# Patient Record
Sex: FEMALE | Race: BLACK OR AFRICAN AMERICAN | NOT HISPANIC OR LATINO | Employment: FULL TIME | URBAN - METROPOLITAN AREA
[De-identification: names, ages, dates, MRNs, and addresses within clinical notes are randomized per-mention and may not be internally consistent; named-entity substitution may affect disease eponyms.]

---

## 2018-12-18 ENCOUNTER — OFFICE VISIT (OUTPATIENT)
Dept: INTERNAL MEDICINE | Facility: CLINIC | Age: 33
End: 2018-12-18
Payer: COMMERCIAL

## 2018-12-18 ENCOUNTER — LAB VISIT (OUTPATIENT)
Dept: LAB | Facility: HOSPITAL | Age: 33
End: 2018-12-18
Attending: INTERNAL MEDICINE
Payer: COMMERCIAL

## 2018-12-18 VITALS
SYSTOLIC BLOOD PRESSURE: 124 MMHG | HEART RATE: 98 BPM | DIASTOLIC BLOOD PRESSURE: 84 MMHG | WEIGHT: 185.63 LBS | BODY MASS INDEX: 35.05 KG/M2 | TEMPERATURE: 97 F | HEIGHT: 61 IN | OXYGEN SATURATION: 99 %

## 2018-12-18 DIAGNOSIS — Z00.00 ANNUAL PHYSICAL EXAM: Primary | ICD-10-CM

## 2018-12-18 DIAGNOSIS — E66.01 SEVERE OBESITY WITH BODY MASS INDEX (BMI) OF 35.0 TO 39.9 WITH COMORBIDITY: ICD-10-CM

## 2018-12-18 DIAGNOSIS — I10 HYPERTENSION GOAL BP (BLOOD PRESSURE) < 140/90: ICD-10-CM

## 2018-12-18 LAB
ALBUMIN SERPL BCP-MCNC: 3.8 G/DL
ALP SERPL-CCNC: 98 U/L
ALT SERPL W/O P-5'-P-CCNC: 12 U/L
ANION GAP SERPL CALC-SCNC: 11 MMOL/L
AST SERPL-CCNC: 13 U/L
BASOPHILS # BLD AUTO: 0.04 K/UL
BASOPHILS NFR BLD: 1.1 %
BILIRUB SERPL-MCNC: 0.3 MG/DL
BUN SERPL-MCNC: 14 MG/DL
CALCIUM SERPL-MCNC: 9.7 MG/DL
CHLORIDE SERPL-SCNC: 107 MMOL/L
CHOLEST SERPL-MCNC: 184 MG/DL
CHOLEST/HDLC SERPL: 4.8 {RATIO}
CO2 SERPL-SCNC: 24 MMOL/L
CREAT SERPL-MCNC: 0.8 MG/DL
DIFFERENTIAL METHOD: ABNORMAL
EOSINOPHIL # BLD AUTO: 0.1 K/UL
EOSINOPHIL NFR BLD: 1.7 %
ERYTHROCYTE [DISTWIDTH] IN BLOOD BY AUTOMATED COUNT: 16.2 %
EST. GFR  (AFRICAN AMERICAN): >60 ML/MIN/1.73 M^2
EST. GFR  (NON AFRICAN AMERICAN): >60 ML/MIN/1.73 M^2
GLUCOSE SERPL-MCNC: 94 MG/DL
HCT VFR BLD AUTO: 37.3 %
HDLC SERPL-MCNC: 38 MG/DL
HDLC SERPL: 20.7 %
HGB BLD-MCNC: 11.6 G/DL
IMM GRANULOCYTES # BLD AUTO: 0.01 K/UL
IMM GRANULOCYTES NFR BLD AUTO: 0.3 %
LDLC SERPL CALC-MCNC: 120.2 MG/DL
LYMPHOCYTES # BLD AUTO: 1.4 K/UL
LYMPHOCYTES NFR BLD: 40 %
MCH RBC QN AUTO: 25.1 PG
MCHC RBC AUTO-ENTMCNC: 31.1 G/DL
MCV RBC AUTO: 81 FL
MONOCYTES # BLD AUTO: 0.4 K/UL
MONOCYTES NFR BLD: 10.1 %
NEUTROPHILS # BLD AUTO: 1.7 K/UL
NEUTROPHILS NFR BLD: 46.8 %
NONHDLC SERPL-MCNC: 146 MG/DL
NRBC BLD-RTO: 0 /100 WBC
PLATELET # BLD AUTO: 296 K/UL
PMV BLD AUTO: 10.8 FL
POTASSIUM SERPL-SCNC: 4.1 MMOL/L
PROT SERPL-MCNC: 7.5 G/DL
RBC # BLD AUTO: 4.63 M/UL
SODIUM SERPL-SCNC: 142 MMOL/L
TRIGL SERPL-MCNC: 129 MG/DL
TSH SERPL DL<=0.005 MIU/L-ACNC: 1.28 UIU/ML
WBC # BLD AUTO: 3.55 K/UL

## 2018-12-18 PROCEDURE — 85025 COMPLETE CBC W/AUTO DIFF WBC: CPT

## 2018-12-18 PROCEDURE — 80061 LIPID PANEL: CPT

## 2018-12-18 PROCEDURE — 36415 COLL VENOUS BLD VENIPUNCTURE: CPT

## 2018-12-18 PROCEDURE — 99385 PREV VISIT NEW AGE 18-39: CPT | Mod: S$GLB,,, | Performed by: INTERNAL MEDICINE

## 2018-12-18 PROCEDURE — 80053 COMPREHEN METABOLIC PANEL: CPT

## 2018-12-18 PROCEDURE — 84443 ASSAY THYROID STIM HORMONE: CPT

## 2018-12-18 PROCEDURE — 99999 PR PBB SHADOW E&M-EST. PATIENT-LVL III: CPT | Mod: PBBFAC,,, | Performed by: INTERNAL MEDICINE

## 2018-12-18 RX ORDER — NIFEDIPINE 90 MG/1
90 TABLET, FILM COATED, EXTENDED RELEASE ORAL DAILY
Refills: 12 | COMMUNITY
Start: 2018-11-15 | End: 2020-09-23

## 2018-12-18 NOTE — PROGRESS NOTES
Subjective:       Patient ID: Shania Tapia is a 33 y.o. female.    Chief Complaint: Establish Care and Annual Exam    33 y.o. Black or  female     Patient presents with:  Establish Care  Annual Exam    HPI: Here today to establish care and an annual exam.   She is two months post partum. She had pre-eclampsia during pregnancy. She was diagnosed with HTN following delivery. She is currently taking nifedipine 90 mg. Her b/p is stable. She denies side effects.     Past Medical History:  Abnormal Pap smear  Abnormal Pap smear of vagina  Hypertension    Past Surgical History:  CERVICAL BIOPSY      Comment:  Conization     Review of patient's family history indicates:  Problem: Diabetes      Relation: Paternal Grandmother          Age of Onset: (Not Specified)  Problem: Hypertension      Relation: Father          Age of Onset: (Not Specified)  Problem: Heart attack      Relation: Father          Age of Onset: (Not Specified)  Problem: Breast cancer      Relation: Neg Hx          Age of Onset: (Not Specified)  Problem: Colon cancer      Relation: Neg Hx          Age of Onset: (Not Specified)  Problem: Ovarian cancer      Relation: Neg Hx          Age of Onset: (Not Specified)      Current Outpatient Medications on File Prior to Visit:  NIFEdipine (ADALAT CC) 90 MG TbSR, Take 90 mg by mouth once daily., Disp: , Rfl: 12    Allergies:   Review of patient's allergies indicates:   -- Latex, natural rubber -- Rash              Review of Systems   Constitutional: Negative for fatigue, fever and unexpected weight change.   HENT: Negative for trouble swallowing and voice change.    Eyes: Negative for visual disturbance.   Respiratory: Negative for cough and shortness of breath.    Cardiovascular: Negative for chest pain, palpitations and leg swelling.   Gastrointestinal: Negative for abdominal pain, blood in stool and constipation.   Genitourinary: Negative for difficulty urinating, dysuria and menstrual  problem.   Musculoskeletal: Negative for arthralgias, back pain, gait problem and joint swelling.   Skin: Negative for rash.   Neurological: Negative for dizziness and headaches.   Psychiatric/Behavioral: Negative for dysphoric mood and sleep disturbance. The patient is not nervous/anxious.        Objective:      Physical Exam   Constitutional: She is oriented to person, place, and time. She appears well-developed and well-nourished. No distress.   HENT:   Mouth/Throat: No oropharyngeal exudate.   Eyes: No scleral icterus.   Neck: No tracheal deviation present. No thyromegaly present.   Cardiovascular: Normal rate, regular rhythm and normal heart sounds.   Pulmonary/Chest: Effort normal and breath sounds normal. No respiratory distress. She has no wheezes. She has no rales.   Abdominal: Soft. Bowel sounds are normal.   Musculoskeletal: She exhibits no edema.   Lymphadenopathy:     She has no cervical adenopathy.   Neurological: She is alert and oriented to person, place, and time.   Skin: Skin is warm and dry.   Psychiatric: She has a normal mood and affect.   Vitals reviewed.      Assessment:       1. Annual physical exam    2. Hypertension goal BP (blood pressure) < 140/90    3. Severe obesity with body mass index (BMI) of 35.0 to 39.9 with comorbidity        Plan:       Shania was seen today for establish care and annual exam.    Diagnoses and all orders for this visit:    Annual physical exam  -     Counseled regarding age appropriate screenings and immunizations  -     Counseled regarding lifestyle modifications  -     Lipid panel; Future  -     Comprehensive metabolic panel; Future  -     TSH; Future  -     CBC auto differential; Future    Hypertension goal BP (blood pressure) < 140/90  -     Continue current dose of nifedipine  -     Lifestyle modifications discussed  -     Lipid panel; Future  -     Comprehensive metabolic panel; Future  -     TSH; Future  -     CBC auto differential; Future    Severe  obesity with body mass index (BMI) of 35.0 to 39.9 with comorbidity  -     Lifestyle modifications discussed    Labs today.     Obtain Pap smear report.     F/U in 6 months and as needed.

## 2018-12-24 ENCOUNTER — TELEPHONE (OUTPATIENT)
Dept: INTERNAL MEDICINE | Facility: CLINIC | Age: 33
End: 2018-12-24

## 2018-12-24 DIAGNOSIS — D72.819 LEUKOPENIA, UNSPECIFIED TYPE: Primary | ICD-10-CM

## 2018-12-24 NOTE — TELEPHONE ENCOUNTER
----- Message from Flavia Fink DO sent at 12/24/2018 11:15 AM CST -----  Schedule repeat CBC in 1 month.

## 2019-01-22 ENCOUNTER — PATIENT MESSAGE (OUTPATIENT)
Dept: INTERNAL MEDICINE | Facility: CLINIC | Age: 34
End: 2019-01-22

## 2019-01-24 ENCOUNTER — LAB VISIT (OUTPATIENT)
Dept: LAB | Facility: HOSPITAL | Age: 34
End: 2019-01-24
Payer: COMMERCIAL

## 2019-01-24 DIAGNOSIS — D72.819 LEUKOPENIA, UNSPECIFIED TYPE: ICD-10-CM

## 2019-01-24 LAB
BASOPHILS # BLD AUTO: 0.03 K/UL
BASOPHILS NFR BLD: 0.9 %
DIFFERENTIAL METHOD: ABNORMAL
EOSINOPHIL # BLD AUTO: 0 K/UL
EOSINOPHIL NFR BLD: 0.9 %
ERYTHROCYTE [DISTWIDTH] IN BLOOD BY AUTOMATED COUNT: 13.9 %
HCT VFR BLD AUTO: 36.5 %
HGB BLD-MCNC: 11 G/DL
IMM GRANULOCYTES # BLD AUTO: 0 K/UL
IMM GRANULOCYTES NFR BLD AUTO: 0 %
LYMPHOCYTES # BLD AUTO: 1.3 K/UL
LYMPHOCYTES NFR BLD: 37.9 %
MCH RBC QN AUTO: 24.8 PG
MCHC RBC AUTO-ENTMCNC: 30.1 G/DL
MCV RBC AUTO: 82 FL
MONOCYTES # BLD AUTO: 0.4 K/UL
MONOCYTES NFR BLD: 11.3 %
NEUTROPHILS # BLD AUTO: 1.6 K/UL
NEUTROPHILS NFR BLD: 49 %
NRBC BLD-RTO: 0 /100 WBC
PLATELET # BLD AUTO: 321 K/UL
PMV BLD AUTO: 11 FL
RBC # BLD AUTO: 4.44 M/UL
WBC # BLD AUTO: 3.35 K/UL

## 2019-01-24 PROCEDURE — 85025 COMPLETE CBC W/AUTO DIFF WBC: CPT

## 2019-01-24 PROCEDURE — 36415 COLL VENOUS BLD VENIPUNCTURE: CPT

## 2019-01-29 ENCOUNTER — TELEPHONE (OUTPATIENT)
Dept: INTERNAL MEDICINE | Facility: CLINIC | Age: 34
End: 2019-01-29

## 2020-09-23 ENCOUNTER — OFFICE VISIT (OUTPATIENT)
Dept: INTERNAL MEDICINE | Facility: CLINIC | Age: 35
End: 2020-09-23
Payer: COMMERCIAL

## 2020-09-23 VITALS
SYSTOLIC BLOOD PRESSURE: 146 MMHG | BODY MASS INDEX: 32.51 KG/M2 | HEIGHT: 61 IN | HEART RATE: 111 BPM | WEIGHT: 172.19 LBS | OXYGEN SATURATION: 96 % | DIASTOLIC BLOOD PRESSURE: 86 MMHG | TEMPERATURE: 99 F

## 2020-09-23 DIAGNOSIS — Z11.4 SCREENING FOR HIV (HUMAN IMMUNODEFICIENCY VIRUS): ICD-10-CM

## 2020-09-23 DIAGNOSIS — Z11.59 NEED FOR HEPATITIS C SCREENING TEST: ICD-10-CM

## 2020-09-23 DIAGNOSIS — I10 ESSENTIAL HYPERTENSION: Primary | ICD-10-CM

## 2020-09-23 DIAGNOSIS — F41.9 ANXIETY: ICD-10-CM

## 2020-09-23 PROCEDURE — 99999 PR PBB SHADOW E&M-EST. PATIENT-LVL III: ICD-10-PCS | Mod: PBBFAC,,, | Performed by: INTERNAL MEDICINE

## 2020-09-23 PROCEDURE — 3008F BODY MASS INDEX DOCD: CPT | Mod: CPTII,S$GLB,, | Performed by: INTERNAL MEDICINE

## 2020-09-23 PROCEDURE — 3079F DIAST BP 80-89 MM HG: CPT | Mod: CPTII,S$GLB,, | Performed by: INTERNAL MEDICINE

## 2020-09-23 PROCEDURE — 3077F SYST BP >= 140 MM HG: CPT | Mod: CPTII,S$GLB,, | Performed by: INTERNAL MEDICINE

## 2020-09-23 PROCEDURE — 99999 PR PBB SHADOW E&M-EST. PATIENT-LVL III: CPT | Mod: PBBFAC,,, | Performed by: INTERNAL MEDICINE

## 2020-09-23 PROCEDURE — 3077F PR MOST RECENT SYSTOLIC BLOOD PRESSURE >= 140 MM HG: ICD-10-PCS | Mod: CPTII,S$GLB,, | Performed by: INTERNAL MEDICINE

## 2020-09-23 PROCEDURE — 3079F PR MOST RECENT DIASTOLIC BLOOD PRESSURE 80-89 MM HG: ICD-10-PCS | Mod: CPTII,S$GLB,, | Performed by: INTERNAL MEDICINE

## 2020-09-23 PROCEDURE — 99214 PR OFFICE/OUTPT VISIT, EST, LEVL IV, 30-39 MIN: ICD-10-PCS | Mod: S$GLB,,, | Performed by: INTERNAL MEDICINE

## 2020-09-23 PROCEDURE — 99214 OFFICE O/P EST MOD 30 MIN: CPT | Mod: S$GLB,,, | Performed by: INTERNAL MEDICINE

## 2020-09-23 PROCEDURE — 3008F PR BODY MASS INDEX (BMI) DOCUMENTED: ICD-10-PCS | Mod: CPTII,S$GLB,, | Performed by: INTERNAL MEDICINE

## 2020-09-23 RX ORDER — NIFEDIPINE 30 MG/1
30 TABLET, FILM COATED, EXTENDED RELEASE ORAL DAILY
Qty: 30 TABLET | Refills: 2 | Status: SHIPPED | OUTPATIENT
Start: 2020-09-23 | End: 2020-12-17

## 2020-09-23 RX ORDER — HYDROXYZINE HYDROCHLORIDE 25 MG/1
25 TABLET, FILM COATED ORAL 3 TIMES DAILY PRN
Qty: 45 TABLET | Refills: 1 | Status: SHIPPED | OUTPATIENT
Start: 2020-09-23 | End: 2022-10-20

## 2020-09-23 NOTE — PATIENT INSTRUCTIONS

## 2020-09-23 NOTE — PROGRESS NOTES
Subjective:       Patient ID: Shania Tapia is a 35 y.o. female.    Chief Complaint: Hypertension    Hypertension  This is a recurrent problem. The current episode started more than 1 year ago. The problem has been waxing and waning since onset. The problem is controlled. Associated symptoms include anxiety. Pertinent negatives include no blurred vision, chest pain, headaches, malaise/fatigue, neck pain, orthopnea, palpitations, peripheral edema, PND, shortness of breath or sweats. There are no associated agents to hypertension. There are no known risk factors for coronary artery disease. Past treatments include nothing. The current treatment provides no improvement. Compliance problems include medication side effects.      She does admit to having a lot of anxiety in certain situations. She would like something she can take as needed.     Review of Systems   Constitutional: Negative for malaise/fatigue.   Eyes: Negative for blurred vision.   Respiratory: Negative for shortness of breath.    Cardiovascular: Negative for chest pain, palpitations, orthopnea and PND.   Musculoskeletal: Negative for neck pain.   Neurological: Negative for headaches.   Psychiatric/Behavioral: The patient is nervous/anxious.          Objective:      Physical Exam  Vitals signs reviewed.   Constitutional:       General: She is not in acute distress.     Appearance: She is well-developed. She is obese.   Eyes:      General: No scleral icterus.  Cardiovascular:      Rate and Rhythm: Tachycardia present.   Pulmonary:      Effort: Pulmonary effort is normal. No respiratory distress.   Neurological:      Mental Status: She is alert and oriented to person, place, and time.   Psychiatric:         Behavior: Behavior normal.         Thought Content: Thought content normal.         Judgment: Judgment normal.         Assessment:       1. Essential hypertension    2. Anxiety    3. Screening for HIV (human immunodeficiency virus)    4. Need for  hepatitis C screening test        Plan:       Shania was seen today for hypertension.    Diagnoses and all orders for this visit:    Essential hypertension  -     NIFEdipine (ADALAT CC) 30 MG TbSR; Take 1 tablet (30 mg total) by mouth once daily.  -     Lipid Panel; Standing    Anxiety  -     hydrOXYzine HCL (ATARAX) 25 MG tablet; Take 1 tablet (25 mg total) by mouth 3 (three) times daily as needed for Anxiety. Discussed medication risks and benefits.     Screening for HIV (human immunodeficiency virus)  -     HIV 1/2 Ag/Ab (4th Gen); Future    Need for hepatitis C screening test  -     Hepatitis C Antibody; Future    F/U in 4 week.

## 2020-10-20 ENCOUNTER — LAB VISIT (OUTPATIENT)
Dept: LAB | Facility: HOSPITAL | Age: 35
End: 2020-10-20
Attending: INTERNAL MEDICINE
Payer: COMMERCIAL

## 2020-10-20 ENCOUNTER — OFFICE VISIT (OUTPATIENT)
Dept: INTERNAL MEDICINE | Facility: CLINIC | Age: 35
End: 2020-10-20
Payer: COMMERCIAL

## 2020-10-20 VITALS
SYSTOLIC BLOOD PRESSURE: 114 MMHG | TEMPERATURE: 98 F | HEIGHT: 61 IN | DIASTOLIC BLOOD PRESSURE: 82 MMHG | HEART RATE: 92 BPM | WEIGHT: 172.19 LBS | BODY MASS INDEX: 32.51 KG/M2 | OXYGEN SATURATION: 99 %

## 2020-10-20 DIAGNOSIS — F41.9 ANXIETY: ICD-10-CM

## 2020-10-20 DIAGNOSIS — I10 ESSENTIAL HYPERTENSION: ICD-10-CM

## 2020-10-20 DIAGNOSIS — Z11.59 NEED FOR HEPATITIS C SCREENING TEST: ICD-10-CM

## 2020-10-20 DIAGNOSIS — Z11.4 SCREENING FOR HIV (HUMAN IMMUNODEFICIENCY VIRUS): ICD-10-CM

## 2020-10-20 LAB
ANION GAP SERPL CALC-SCNC: 7 MMOL/L (ref 8–16)
BASOPHILS # BLD AUTO: 0.03 K/UL (ref 0–0.2)
BASOPHILS NFR BLD: 0.9 % (ref 0–1.9)
BUN SERPL-MCNC: 12 MG/DL (ref 6–20)
CALCIUM SERPL-MCNC: 9.2 MG/DL (ref 8.7–10.5)
CHLORIDE SERPL-SCNC: 105 MMOL/L (ref 95–110)
CHOLEST SERPL-MCNC: 173 MG/DL (ref 120–199)
CHOLEST/HDLC SERPL: 3.7 {RATIO} (ref 2–5)
CO2 SERPL-SCNC: 27 MMOL/L (ref 23–29)
CREAT SERPL-MCNC: 0.8 MG/DL (ref 0.5–1.4)
DIFFERENTIAL METHOD: ABNORMAL
EOSINOPHIL # BLD AUTO: 0.1 K/UL (ref 0–0.5)
EOSINOPHIL NFR BLD: 3.1 % (ref 0–8)
ERYTHROCYTE [DISTWIDTH] IN BLOOD BY AUTOMATED COUNT: 18.5 % (ref 11.5–14.5)
EST. GFR  (AFRICAN AMERICAN): >60 ML/MIN/1.73 M^2
EST. GFR  (NON AFRICAN AMERICAN): >60 ML/MIN/1.73 M^2
GLUCOSE SERPL-MCNC: 94 MG/DL (ref 70–110)
HCT VFR BLD AUTO: 35 % (ref 37–48.5)
HDLC SERPL-MCNC: 47 MG/DL (ref 40–75)
HDLC SERPL: 27.2 % (ref 20–50)
HGB BLD-MCNC: 9.7 G/DL (ref 12–16)
IMM GRANULOCYTES # BLD AUTO: 0.01 K/UL (ref 0–0.04)
IMM GRANULOCYTES NFR BLD AUTO: 0.3 % (ref 0–0.5)
LDLC SERPL CALC-MCNC: 106 MG/DL (ref 63–159)
LYMPHOCYTES # BLD AUTO: 1 K/UL (ref 1–4.8)
LYMPHOCYTES NFR BLD: 32 % (ref 18–48)
MCH RBC QN AUTO: 20.1 PG (ref 27–31)
MCHC RBC AUTO-ENTMCNC: 27.7 G/DL (ref 32–36)
MCV RBC AUTO: 73 FL (ref 82–98)
MONOCYTES # BLD AUTO: 0.3 K/UL (ref 0.3–1)
MONOCYTES NFR BLD: 9.6 % (ref 4–15)
NEUTROPHILS # BLD AUTO: 1.7 K/UL (ref 1.8–7.7)
NEUTROPHILS NFR BLD: 54.1 % (ref 38–73)
NONHDLC SERPL-MCNC: 126 MG/DL
NRBC BLD-RTO: 0 /100 WBC
PLATELET # BLD AUTO: 363 K/UL (ref 150–350)
PMV BLD AUTO: 11.8 FL (ref 9.2–12.9)
POTASSIUM SERPL-SCNC: 4 MMOL/L (ref 3.5–5.1)
RBC # BLD AUTO: 4.83 M/UL (ref 4–5.4)
SODIUM SERPL-SCNC: 139 MMOL/L (ref 136–145)
TRIGL SERPL-MCNC: 100 MG/DL (ref 30–150)
WBC # BLD AUTO: 3.22 K/UL (ref 3.9–12.7)

## 2020-10-20 PROCEDURE — 36415 COLL VENOUS BLD VENIPUNCTURE: CPT

## 2020-10-20 PROCEDURE — 99999 PR PBB SHADOW E&M-EST. PATIENT-LVL IV: ICD-10-PCS | Mod: PBBFAC,,, | Performed by: INTERNAL MEDICINE

## 2020-10-20 PROCEDURE — 3079F PR MOST RECENT DIASTOLIC BLOOD PRESSURE 80-89 MM HG: ICD-10-PCS | Mod: CPTII,S$GLB,, | Performed by: INTERNAL MEDICINE

## 2020-10-20 PROCEDURE — 3079F DIAST BP 80-89 MM HG: CPT | Mod: CPTII,S$GLB,, | Performed by: INTERNAL MEDICINE

## 2020-10-20 PROCEDURE — 99213 OFFICE O/P EST LOW 20 MIN: CPT | Mod: S$GLB,,, | Performed by: INTERNAL MEDICINE

## 2020-10-20 PROCEDURE — 3074F SYST BP LT 130 MM HG: CPT | Mod: CPTII,S$GLB,, | Performed by: INTERNAL MEDICINE

## 2020-10-20 PROCEDURE — 99213 PR OFFICE/OUTPT VISIT, EST, LEVL III, 20-29 MIN: ICD-10-PCS | Mod: S$GLB,,, | Performed by: INTERNAL MEDICINE

## 2020-10-20 PROCEDURE — 80048 BASIC METABOLIC PNL TOTAL CA: CPT

## 2020-10-20 PROCEDURE — 86703 HIV-1/HIV-2 1 RESULT ANTBDY: CPT

## 2020-10-20 PROCEDURE — 3008F BODY MASS INDEX DOCD: CPT | Mod: CPTII,S$GLB,, | Performed by: INTERNAL MEDICINE

## 2020-10-20 PROCEDURE — 99999 PR PBB SHADOW E&M-EST. PATIENT-LVL IV: CPT | Mod: PBBFAC,,, | Performed by: INTERNAL MEDICINE

## 2020-10-20 PROCEDURE — 85025 COMPLETE CBC W/AUTO DIFF WBC: CPT

## 2020-10-20 PROCEDURE — 80061 LIPID PANEL: CPT

## 2020-10-20 PROCEDURE — 3074F PR MOST RECENT SYSTOLIC BLOOD PRESSURE < 130 MM HG: ICD-10-PCS | Mod: CPTII,S$GLB,, | Performed by: INTERNAL MEDICINE

## 2020-10-20 PROCEDURE — 3008F PR BODY MASS INDEX (BMI) DOCUMENTED: ICD-10-PCS | Mod: CPTII,S$GLB,, | Performed by: INTERNAL MEDICINE

## 2020-10-20 PROCEDURE — 86803 HEPATITIS C AB TEST: CPT

## 2020-10-20 RX ORDER — NIFEDIPINE 30 MG/1
30 TABLET, EXTENDED RELEASE ORAL DAILY
COMMUNITY
Start: 2020-09-23 | End: 2020-10-20 | Stop reason: SDUPTHER

## 2020-10-20 NOTE — PROGRESS NOTES
Subjective:       Patient ID: Shania Tapia is a 35 y.o. female.    Chief Complaint: Follow-up (HTN/anxiety )    Shania Tapia  35 y.o. Black or  female    Patient presents with:  Follow-up: HTN/anxiety     HPI: Presents to the clinic for a 4 week f/u on HTN and anxiety.   HTN--stable on nifedipine. She denies side effects. She reports a mild headache on occasion but it is not unbearable and does not interfere with her daily activities.   Anxiety--she has not had to take hydroxyzine. She has not had any episodes. She is happy she has the hydroxyzine if she needs it.     Past Medical History:  Abnormal Pap smear  Abnormal Pap smear of vagina  Hypertension    Current Outpatient Medications on File Prior to Visit:  hydrOXYzine HCL (ATARAX) 25 MG tablet, Take 1 tablet (25 mg total) by mouth 3 (three) times daily as needed for Anxiety., Disp: 45 tablet, Rfl: 1  NIFEdipine (ADALAT CC) 30 MG TbSR, Take 1 tablet (30 mg total) by mouth once daily., Disp: 30 tablet, Rfl: 2    Allergies:  Review of patient's allergies indicates:   -- Latex, natural rubber -- Rash        Review of Systems   Constitutional: Negative for activity change and unexpected weight change.   HENT: Negative for hearing loss, rhinorrhea and trouble swallowing.    Eyes: Negative for discharge and visual disturbance.   Respiratory: Negative for chest tightness and wheezing.    Cardiovascular: Negative for chest pain and palpitations.   Gastrointestinal: Negative for blood in stool, constipation, diarrhea and vomiting.   Endocrine: Negative for polydipsia and polyuria.   Genitourinary: Negative for difficulty urinating, dysuria, hematuria and menstrual problem.   Musculoskeletal: Negative for arthralgias, joint swelling and neck pain.   Neurological: Negative for weakness and headaches.   Psychiatric/Behavioral: Negative for confusion and dysphoric mood.         Objective:      Physical Exam  Constitutional:       General: She  is not in acute distress.     Appearance: She is well-developed. She is obese. She is not ill-appearing.   Eyes:      General: No scleral icterus.  Cardiovascular:      Rate and Rhythm: Normal rate.   Pulmonary:      Effort: Pulmonary effort is normal. No respiratory distress.   Neurological:      Mental Status: She is alert and oriented to person, place, and time.   Psychiatric:         Behavior: Behavior normal.         Thought Content: Thought content normal.         Judgment: Judgment normal.         Assessment:       1. Essential hypertension    2. Anxiety        Plan:       Shania was seen today for follow-up.    Diagnoses and all orders for this visit:    Essential hypertension  -     Continue nifedipine  -     CBC auto differential; Future  -     Basic Metabolic Panel; Future    Anxiety  -     Monitor  -     Hydroxyzine if needed    Labs today.     F/U in 6 months and as needed.

## 2020-10-21 LAB
HCV AB SERPL QL IA: NEGATIVE
HIV 1+2 AB+HIV1 P24 AG SERPL QL IA: NEGATIVE

## 2021-04-20 ENCOUNTER — OFFICE VISIT (OUTPATIENT)
Dept: INTERNAL MEDICINE | Facility: CLINIC | Age: 36
End: 2021-04-20
Payer: COMMERCIAL

## 2021-04-20 VITALS
DIASTOLIC BLOOD PRESSURE: 84 MMHG | BODY MASS INDEX: 32.3 KG/M2 | HEIGHT: 61 IN | OXYGEN SATURATION: 97 % | HEART RATE: 108 BPM | SYSTOLIC BLOOD PRESSURE: 132 MMHG | WEIGHT: 171.06 LBS | TEMPERATURE: 97 F

## 2021-04-20 DIAGNOSIS — I10 ESSENTIAL HYPERTENSION: Primary | ICD-10-CM

## 2021-04-20 PROCEDURE — 3079F DIAST BP 80-89 MM HG: CPT | Mod: CPTII,S$GLB,, | Performed by: INTERNAL MEDICINE

## 2021-04-20 PROCEDURE — 99999 PR PBB SHADOW E&M-EST. PATIENT-LVL IV: ICD-10-PCS | Mod: PBBFAC,,, | Performed by: INTERNAL MEDICINE

## 2021-04-20 PROCEDURE — 1126F PR PAIN SEVERITY QUANTIFIED, NO PAIN PRESENT: ICD-10-PCS | Mod: S$GLB,,, | Performed by: INTERNAL MEDICINE

## 2021-04-20 PROCEDURE — 3075F SYST BP GE 130 - 139MM HG: CPT | Mod: CPTII,S$GLB,, | Performed by: INTERNAL MEDICINE

## 2021-04-20 PROCEDURE — 99213 OFFICE O/P EST LOW 20 MIN: CPT | Mod: S$GLB,,, | Performed by: INTERNAL MEDICINE

## 2021-04-20 PROCEDURE — 99999 PR PBB SHADOW E&M-EST. PATIENT-LVL IV: CPT | Mod: PBBFAC,,, | Performed by: INTERNAL MEDICINE

## 2021-04-20 PROCEDURE — 3008F BODY MASS INDEX DOCD: CPT | Mod: CPTII,S$GLB,, | Performed by: INTERNAL MEDICINE

## 2021-04-20 PROCEDURE — 99213 PR OFFICE/OUTPT VISIT, EST, LEVL III, 20-29 MIN: ICD-10-PCS | Mod: S$GLB,,, | Performed by: INTERNAL MEDICINE

## 2021-04-20 PROCEDURE — 3075F PR MOST RECENT SYSTOLIC BLOOD PRESS GE 130-139MM HG: ICD-10-PCS | Mod: CPTII,S$GLB,, | Performed by: INTERNAL MEDICINE

## 2021-04-20 PROCEDURE — 3079F PR MOST RECENT DIASTOLIC BLOOD PRESSURE 80-89 MM HG: ICD-10-PCS | Mod: CPTII,S$GLB,, | Performed by: INTERNAL MEDICINE

## 2021-04-20 PROCEDURE — 3008F PR BODY MASS INDEX (BMI) DOCUMENTED: ICD-10-PCS | Mod: CPTII,S$GLB,, | Performed by: INTERNAL MEDICINE

## 2021-04-20 PROCEDURE — 1126F AMNT PAIN NOTED NONE PRSNT: CPT | Mod: S$GLB,,, | Performed by: INTERNAL MEDICINE

## 2021-04-28 ENCOUNTER — PATIENT MESSAGE (OUTPATIENT)
Dept: RESEARCH | Facility: HOSPITAL | Age: 36
End: 2021-04-28

## 2021-06-18 ENCOUNTER — IMMUNIZATION (OUTPATIENT)
Dept: INTERNAL MEDICINE | Facility: CLINIC | Age: 36
End: 2021-06-18
Payer: COMMERCIAL

## 2021-06-18 DIAGNOSIS — Z23 NEED FOR VACCINATION: Primary | ICD-10-CM

## 2021-06-18 PROCEDURE — 91300 COVID-19, MRNA, LNP-S, PF, 30 MCG/0.3 ML DOSE VACCINE: CPT | Mod: PBBFAC | Performed by: FAMILY MEDICINE

## 2021-07-09 ENCOUNTER — IMMUNIZATION (OUTPATIENT)
Dept: INTERNAL MEDICINE | Facility: CLINIC | Age: 36
End: 2021-07-09
Payer: COMMERCIAL

## 2021-07-09 DIAGNOSIS — Z23 NEED FOR VACCINATION: Primary | ICD-10-CM

## 2021-07-09 PROCEDURE — 91300 COVID-19, MRNA, LNP-S, PF, 30 MCG/0.3 ML DOSE VACCINE: CPT | Mod: PBBFAC | Performed by: FAMILY MEDICINE

## 2021-07-09 PROCEDURE — 0002A COVID-19, MRNA, LNP-S, PF, 30 MCG/0.3 ML DOSE VACCINE: CPT | Mod: PBBFAC | Performed by: FAMILY MEDICINE

## 2021-07-23 DIAGNOSIS — I10 ESSENTIAL HYPERTENSION: ICD-10-CM

## 2021-07-23 RX ORDER — NIFEDIPINE 30 MG/1
TABLET, EXTENDED RELEASE ORAL
Qty: 90 TABLET | Refills: 2 | Status: SHIPPED | OUTPATIENT
Start: 2021-07-23 | End: 2022-02-12 | Stop reason: SDUPTHER

## 2021-10-20 ENCOUNTER — LAB VISIT (OUTPATIENT)
Dept: LAB | Facility: HOSPITAL | Age: 36
End: 2021-10-20
Attending: INTERNAL MEDICINE
Payer: COMMERCIAL

## 2021-10-20 ENCOUNTER — OFFICE VISIT (OUTPATIENT)
Dept: INTERNAL MEDICINE | Facility: CLINIC | Age: 36
End: 2021-10-20
Payer: COMMERCIAL

## 2021-10-20 VITALS
RESPIRATION RATE: 16 BRPM | DIASTOLIC BLOOD PRESSURE: 84 MMHG | HEART RATE: 88 BPM | BODY MASS INDEX: 32.89 KG/M2 | OXYGEN SATURATION: 97 % | HEIGHT: 61 IN | WEIGHT: 174.19 LBS | SYSTOLIC BLOOD PRESSURE: 138 MMHG | TEMPERATURE: 98 F

## 2021-10-20 DIAGNOSIS — D50.9 MICROCYTIC ANEMIA: ICD-10-CM

## 2021-10-20 DIAGNOSIS — Z00.00 ANNUAL PHYSICAL EXAM: ICD-10-CM

## 2021-10-20 DIAGNOSIS — Z00.00 ANNUAL PHYSICAL EXAM: Primary | ICD-10-CM

## 2021-10-20 DIAGNOSIS — F41.9 ANXIETY: ICD-10-CM

## 2021-10-20 DIAGNOSIS — I10 ESSENTIAL HYPERTENSION: ICD-10-CM

## 2021-10-20 DIAGNOSIS — E66.9 OBESITY (BMI 30.0-34.9): ICD-10-CM

## 2021-10-20 LAB
ALBUMIN SERPL BCP-MCNC: 3.9 G/DL (ref 3.5–5.2)
ALP SERPL-CCNC: 78 U/L (ref 55–135)
ALT SERPL W/O P-5'-P-CCNC: 8 U/L (ref 10–44)
ANION GAP SERPL CALC-SCNC: 12 MMOL/L (ref 8–16)
AST SERPL-CCNC: 15 U/L (ref 10–40)
BASOPHILS # BLD AUTO: 0.03 K/UL (ref 0–0.2)
BASOPHILS NFR BLD: 0.8 % (ref 0–1.9)
BILIRUB SERPL-MCNC: 0.6 MG/DL (ref 0.1–1)
BUN SERPL-MCNC: 9 MG/DL (ref 6–20)
CALCIUM SERPL-MCNC: 9.2 MG/DL (ref 8.7–10.5)
CHLORIDE SERPL-SCNC: 107 MMOL/L (ref 95–110)
CHOLEST SERPL-MCNC: 164 MG/DL (ref 120–199)
CHOLEST/HDLC SERPL: 3.6 {RATIO} (ref 2–5)
CO2 SERPL-SCNC: 22 MMOL/L (ref 23–29)
CREAT SERPL-MCNC: 0.8 MG/DL (ref 0.5–1.4)
DIFFERENTIAL METHOD: ABNORMAL
EOSINOPHIL # BLD AUTO: 0 K/UL (ref 0–0.5)
EOSINOPHIL NFR BLD: 0.8 % (ref 0–8)
ERYTHROCYTE [DISTWIDTH] IN BLOOD BY AUTOMATED COUNT: 17.3 % (ref 11.5–14.5)
EST. GFR  (AFRICAN AMERICAN): >60 ML/MIN/1.73 M^2
EST. GFR  (NON AFRICAN AMERICAN): >60 ML/MIN/1.73 M^2
FERRITIN SERPL-MCNC: 5 NG/ML (ref 20–300)
GLUCOSE SERPL-MCNC: 86 MG/DL (ref 70–110)
HCT VFR BLD AUTO: 34.9 % (ref 37–48.5)
HDLC SERPL-MCNC: 45 MG/DL (ref 40–75)
HDLC SERPL: 27.4 % (ref 20–50)
HGB BLD-MCNC: 10.2 G/DL (ref 12–16)
IMM GRANULOCYTES # BLD AUTO: 0 K/UL (ref 0–0.04)
IMM GRANULOCYTES NFR BLD AUTO: 0 % (ref 0–0.5)
IRON SERPL-MCNC: 23 UG/DL (ref 30–160)
LDLC SERPL CALC-MCNC: 103.6 MG/DL (ref 63–159)
LYMPHOCYTES # BLD AUTO: 1 K/UL (ref 1–4.8)
LYMPHOCYTES NFR BLD: 27.3 % (ref 18–48)
MCH RBC QN AUTO: 21.6 PG (ref 27–31)
MCHC RBC AUTO-ENTMCNC: 29.2 G/DL (ref 32–36)
MCV RBC AUTO: 74 FL (ref 82–98)
MONOCYTES # BLD AUTO: 0.4 K/UL (ref 0.3–1)
MONOCYTES NFR BLD: 9.7 % (ref 4–15)
NEUTROPHILS # BLD AUTO: 2.3 K/UL (ref 1.8–7.7)
NEUTROPHILS NFR BLD: 61.4 % (ref 38–73)
NONHDLC SERPL-MCNC: 119 MG/DL
NRBC BLD-RTO: 0 /100 WBC
PLATELET # BLD AUTO: 328 K/UL (ref 150–450)
PMV BLD AUTO: 11.7 FL (ref 9.2–12.9)
POTASSIUM SERPL-SCNC: 4.1 MMOL/L (ref 3.5–5.1)
PROT SERPL-MCNC: 7.4 G/DL (ref 6–8.4)
RBC # BLD AUTO: 4.72 M/UL (ref 4–5.4)
SATURATED IRON: 5 % (ref 20–50)
SODIUM SERPL-SCNC: 141 MMOL/L (ref 136–145)
TOTAL IRON BINDING CAPACITY: 488 UG/DL (ref 250–450)
TRANSFERRIN SERPL-MCNC: 330 MG/DL (ref 200–375)
TRIGL SERPL-MCNC: 77 MG/DL (ref 30–150)
TSH SERPL DL<=0.005 MIU/L-ACNC: 1.07 UIU/ML (ref 0.4–4)
WBC # BLD AUTO: 3.7 K/UL (ref 3.9–12.7)

## 2021-10-20 PROCEDURE — 80053 COMPREHEN METABOLIC PANEL: CPT | Performed by: INTERNAL MEDICINE

## 2021-10-20 PROCEDURE — 3008F BODY MASS INDEX DOCD: CPT | Mod: CPTII,S$GLB,, | Performed by: INTERNAL MEDICINE

## 2021-10-20 PROCEDURE — 84466 ASSAY OF TRANSFERRIN: CPT | Performed by: INTERNAL MEDICINE

## 2021-10-20 PROCEDURE — 3079F DIAST BP 80-89 MM HG: CPT | Mod: CPTII,S$GLB,, | Performed by: INTERNAL MEDICINE

## 2021-10-20 PROCEDURE — 1160F RVW MEDS BY RX/DR IN RCRD: CPT | Mod: CPTII,S$GLB,, | Performed by: INTERNAL MEDICINE

## 2021-10-20 PROCEDURE — 85025 COMPLETE CBC W/AUTO DIFF WBC: CPT | Performed by: INTERNAL MEDICINE

## 2021-10-20 PROCEDURE — 99999 PR PBB SHADOW E&M-EST. PATIENT-LVL III: CPT | Mod: PBBFAC,,, | Performed by: INTERNAL MEDICINE

## 2021-10-20 PROCEDURE — 84443 ASSAY THYROID STIM HORMONE: CPT | Performed by: INTERNAL MEDICINE

## 2021-10-20 PROCEDURE — 82728 ASSAY OF FERRITIN: CPT | Performed by: INTERNAL MEDICINE

## 2021-10-20 PROCEDURE — 3079F PR MOST RECENT DIASTOLIC BLOOD PRESSURE 80-89 MM HG: ICD-10-PCS | Mod: CPTII,S$GLB,, | Performed by: INTERNAL MEDICINE

## 2021-10-20 PROCEDURE — 3008F PR BODY MASS INDEX (BMI) DOCUMENTED: ICD-10-PCS | Mod: CPTII,S$GLB,, | Performed by: INTERNAL MEDICINE

## 2021-10-20 PROCEDURE — 99999 PR PBB SHADOW E&M-EST. PATIENT-LVL III: ICD-10-PCS | Mod: PBBFAC,,, | Performed by: INTERNAL MEDICINE

## 2021-10-20 PROCEDURE — 3075F SYST BP GE 130 - 139MM HG: CPT | Mod: CPTII,S$GLB,, | Performed by: INTERNAL MEDICINE

## 2021-10-20 PROCEDURE — 1159F PR MEDICATION LIST DOCUMENTED IN MEDICAL RECORD: ICD-10-PCS | Mod: CPTII,S$GLB,, | Performed by: INTERNAL MEDICINE

## 2021-10-20 PROCEDURE — 1159F MED LIST DOCD IN RCRD: CPT | Mod: CPTII,S$GLB,, | Performed by: INTERNAL MEDICINE

## 2021-10-20 PROCEDURE — 1160F PR REVIEW ALL MEDS BY PRESCRIBER/CLIN PHARMACIST DOCUMENTED: ICD-10-PCS | Mod: CPTII,S$GLB,, | Performed by: INTERNAL MEDICINE

## 2021-10-20 PROCEDURE — 99395 PR PREVENTIVE VISIT,EST,18-39: ICD-10-PCS | Mod: S$GLB,,, | Performed by: INTERNAL MEDICINE

## 2021-10-20 PROCEDURE — 80061 LIPID PANEL: CPT | Performed by: INTERNAL MEDICINE

## 2021-10-20 PROCEDURE — 36415 COLL VENOUS BLD VENIPUNCTURE: CPT | Performed by: INTERNAL MEDICINE

## 2021-10-20 PROCEDURE — 3075F PR MOST RECENT SYSTOLIC BLOOD PRESS GE 130-139MM HG: ICD-10-PCS | Mod: CPTII,S$GLB,, | Performed by: INTERNAL MEDICINE

## 2021-10-20 PROCEDURE — 99395 PREV VISIT EST AGE 18-39: CPT | Mod: S$GLB,,, | Performed by: INTERNAL MEDICINE

## 2021-10-20 RX ORDER — FLUCONAZOLE 150 MG/1
150 TABLET ORAL ONCE
COMMUNITY
Start: 2021-08-19 | End: 2022-10-20

## 2022-02-17 ENCOUNTER — PATIENT MESSAGE (OUTPATIENT)
Dept: INTERNAL MEDICINE | Facility: CLINIC | Age: 37
End: 2022-02-17
Payer: COMMERCIAL

## 2022-06-29 ENCOUNTER — PATIENT MESSAGE (OUTPATIENT)
Dept: INTERNAL MEDICINE | Facility: CLINIC | Age: 37
End: 2022-06-29
Payer: COMMERCIAL

## 2022-06-29 DIAGNOSIS — R60.9 SWELLING: Primary | ICD-10-CM

## 2022-06-29 DIAGNOSIS — I10 ESSENTIAL HYPERTENSION: ICD-10-CM

## 2022-06-29 RX ORDER — HYDROCHLOROTHIAZIDE 12.5 MG/1
12.5 TABLET ORAL DAILY PRN
Qty: 30 TABLET | Refills: 1 | Status: SHIPPED | OUTPATIENT
Start: 2022-06-29 | End: 2022-09-12

## 2022-07-21 DIAGNOSIS — I10 ESSENTIAL HYPERTENSION: ICD-10-CM

## 2022-07-21 DIAGNOSIS — R60.9 SWELLING: ICD-10-CM

## 2022-07-21 RX ORDER — HYDROCHLOROTHIAZIDE 12.5 MG/1
12.5 TABLET ORAL DAILY PRN
Qty: 30 TABLET | Refills: 1 | OUTPATIENT
Start: 2022-07-21

## 2022-07-21 NOTE — TELEPHONE ENCOUNTER
Care Due:                  Date            Visit Type   Department     Provider  --------------------------------------------------------------------------------                                EP -                              PRIMARY      [facility] INTERNAL  Last Visit: 10-      CARE ([facility])   HORTENSIA Fink                              EP -                              PRIMARY      [facility] INTERNAL  Next Visit: 10-      CARE ([facility])   HORTENSIA Fink                                                            Last  Test          Frequency    Reason                     Performed    Due Date  --------------------------------------------------------------------------------    CMP.........  12 months..  hydroCHLOROthiazide......  10-   10-    [facility] Embedded Care Gaps. Reference number: [id number]. 7/21/2022   8:31:21 AM CDT

## 2022-07-21 NOTE — TELEPHONE ENCOUNTER
Refill Routing Note   Medication(s) are not appropriate for processing by [facility] for the following reason(s):      - Medication is a new start (<3 months)    [facility] action(s):  Defer       Medication Therapy Plan: New start (6/29/22); Defer  Medication reconciliation completed: No     Appointments  past 12m or future 3m with PCP    Date Provider   Last Visit   10/20/2021 Flavia Fink, DO   Next Visit   10/20/2022 Flavia Fink, DO   ED visits in past 90 days: 0        Note composed:3:56 PM 07/21/2022

## 2022-08-26 ENCOUNTER — OFFICE VISIT (OUTPATIENT)
Dept: INTERNAL MEDICINE | Facility: CLINIC | Age: 37
End: 2022-08-26
Payer: COMMERCIAL

## 2022-08-26 ENCOUNTER — HOSPITAL ENCOUNTER (OUTPATIENT)
Dept: RADIOLOGY | Facility: HOSPITAL | Age: 37
Discharge: HOME | End: 2022-08-26
Attending: PHYSICIAN ASSISTANT
Payer: COMMERCIAL

## 2022-08-26 VITALS
WEIGHT: 169.06 LBS | RESPIRATION RATE: 18 BRPM | HEART RATE: 125 BPM | OXYGEN SATURATION: 99 % | BODY MASS INDEX: 31.92 KG/M2 | SYSTOLIC BLOOD PRESSURE: 132 MMHG | DIASTOLIC BLOOD PRESSURE: 86 MMHG | HEIGHT: 61 IN

## 2022-08-26 DIAGNOSIS — M25.421 SWELLING OF RIGHT ELBOW: ICD-10-CM

## 2022-08-26 DIAGNOSIS — I10 ESSENTIAL HYPERTENSION: ICD-10-CM

## 2022-08-26 DIAGNOSIS — L03.113 CELLULITIS OF RIGHT ELBOW: Primary | ICD-10-CM

## 2022-08-26 PROCEDURE — 3075F SYST BP GE 130 - 139MM HG: CPT | Mod: CPTII,S$GLB,, | Performed by: PHYSICIAN ASSISTANT

## 2022-08-26 PROCEDURE — 1160F RVW MEDS BY RX/DR IN RCRD: CPT | Mod: CPTII,S$GLB,, | Performed by: PHYSICIAN ASSISTANT

## 2022-08-26 PROCEDURE — 73080 X-RAY EXAM OF ELBOW: CPT | Mod: TC,RT

## 2022-08-26 PROCEDURE — 99999 PR PBB SHADOW E&M-EST. PATIENT-LVL IV: CPT | Mod: PBBFAC,,, | Performed by: PHYSICIAN ASSISTANT

## 2022-08-26 PROCEDURE — 1160F PR REVIEW ALL MEDS BY PRESCRIBER/CLIN PHARMACIST DOCUMENTED: ICD-10-PCS | Mod: CPTII,S$GLB,, | Performed by: PHYSICIAN ASSISTANT

## 2022-08-26 PROCEDURE — 1159F MED LIST DOCD IN RCRD: CPT | Mod: CPTII,S$GLB,, | Performed by: PHYSICIAN ASSISTANT

## 2022-08-26 PROCEDURE — 99214 OFFICE O/P EST MOD 30 MIN: CPT | Mod: S$GLB,,, | Performed by: PHYSICIAN ASSISTANT

## 2022-08-26 PROCEDURE — 99999 PR PBB SHADOW E&M-EST. PATIENT-LVL IV: ICD-10-PCS | Mod: PBBFAC,,, | Performed by: PHYSICIAN ASSISTANT

## 2022-08-26 PROCEDURE — 3008F PR BODY MASS INDEX (BMI) DOCUMENTED: ICD-10-PCS | Mod: CPTII,S$GLB,, | Performed by: PHYSICIAN ASSISTANT

## 2022-08-26 PROCEDURE — 73080 XR ELBOW COMPLETE 3 VIEW RIGHT: ICD-10-PCS | Mod: 26,RT,, | Performed by: STUDENT IN AN ORGANIZED HEALTH CARE EDUCATION/TRAINING PROGRAM

## 2022-08-26 PROCEDURE — 99214 PR OFFICE/OUTPT VISIT, EST, LEVL IV, 30-39 MIN: ICD-10-PCS | Mod: S$GLB,,, | Performed by: PHYSICIAN ASSISTANT

## 2022-08-26 PROCEDURE — 1159F PR MEDICATION LIST DOCUMENTED IN MEDICAL RECORD: ICD-10-PCS | Mod: CPTII,S$GLB,, | Performed by: PHYSICIAN ASSISTANT

## 2022-08-26 PROCEDURE — 3079F DIAST BP 80-89 MM HG: CPT | Mod: CPTII,S$GLB,, | Performed by: PHYSICIAN ASSISTANT

## 2022-08-26 PROCEDURE — 3075F PR MOST RECENT SYSTOLIC BLOOD PRESS GE 130-139MM HG: ICD-10-PCS | Mod: CPTII,S$GLB,, | Performed by: PHYSICIAN ASSISTANT

## 2022-08-26 PROCEDURE — 73080 X-RAY EXAM OF ELBOW: CPT | Mod: 26,RT,, | Performed by: STUDENT IN AN ORGANIZED HEALTH CARE EDUCATION/TRAINING PROGRAM

## 2022-08-26 PROCEDURE — 3008F BODY MASS INDEX DOCD: CPT | Mod: CPTII,S$GLB,, | Performed by: PHYSICIAN ASSISTANT

## 2022-08-26 PROCEDURE — 3079F PR MOST RECENT DIASTOLIC BLOOD PRESSURE 80-89 MM HG: ICD-10-PCS | Mod: CPTII,S$GLB,, | Performed by: PHYSICIAN ASSISTANT

## 2022-08-26 RX ORDER — NAPROXEN 500 MG/1
500 TABLET ORAL 2 TIMES DAILY PRN
Qty: 30 TABLET | Refills: 0 | Status: SHIPPED | OUTPATIENT
Start: 2022-08-26 | End: 2022-09-10

## 2022-08-26 RX ORDER — SULFAMETHOXAZOLE AND TRIMETHOPRIM 800; 160 MG/1; MG/1
1 TABLET ORAL 2 TIMES DAILY
Qty: 14 TABLET | Refills: 0 | Status: SHIPPED | OUTPATIENT
Start: 2022-08-26 | End: 2022-09-02

## 2022-08-26 NOTE — PROGRESS NOTES
"Subjective:      Patient ID: Shania Tapia is a 37 y.o. female.    Chief Complaint: No chief complaint on file.    Patient is new to me, being seen today for elbow swelling.  Noticed redness/swelling to R elbow 2 days ago.  No injury/trauma.  Denies pain, just pressure.  Normal ROM.  Denies insect bite or skin changes.  No itching.     Denies h/o gout     BP elevated, reports white coat HTN, does not check BP at home     Last visit October 2021 with Dr. Fink.     Review of Systems   Constitutional: Negative for chills, diaphoresis and fever.   HENT: Negative for congestion, rhinorrhea and sore throat.    Respiratory: Negative for cough, shortness of breath and wheezing.    Gastrointestinal: Negative for abdominal pain, constipation, diarrhea, nausea and vomiting.   Musculoskeletal: Positive for joint swelling (R elbow).   Skin: Positive for color change (erythema). Negative for rash.   Neurological: Negative for dizziness, light-headedness and headaches.       Objective:   /86   Pulse (!) 125   Resp 18   Ht 5' 1" (1.549 m)   Wt 76.7 kg (169 lb 1.5 oz)   SpO2 99%   BMI 31.95 kg/m²   Physical Exam  Constitutional:       General: She is not in acute distress.     Appearance: Normal appearance. She is well-developed. She is not ill-appearing.   HENT:      Head: Normocephalic and atraumatic.   Cardiovascular:      Rate and Rhythm: Normal rate and regular rhythm.      Heart sounds: Normal heart sounds. No murmur heard.  Pulmonary:      Effort: Pulmonary effort is normal. No respiratory distress.      Breath sounds: Normal breath sounds. No decreased breath sounds.   Musculoskeletal:      Right elbow: Swelling and effusion present. Normal range of motion. No tenderness.        Arms:       Right lower leg: No edema.      Left lower leg: No edema.   Skin:     General: Skin is warm and dry.      Findings: No rash.   Psychiatric:         Speech: Speech normal.         Behavior: Behavior normal.         " Thought Content: Thought content normal.       Assessment:      1. Cellulitis of right elbow    2. Swelling of right elbow    3. Essential hypertension       Plan:   Cellulitis of right elbow  -     sulfamethoxazole-trimethoprim 800-160mg (BACTRIM DS) 800-160 mg Tab; Take 1 tablet by mouth 2 (two) times daily. for 7 days  Dispense: 14 tablet; Refill: 0    Swelling of right elbow  -     CBC Auto Differential; Future; Expected date: 08/26/2022  -     Uric Acid; Future; Expected date: 08/26/2022  -     naproxen (NAPROSYN) 500 MG tablet; Take 1 tablet (500 mg total) by mouth 2 (two) times daily as needed (pain).  Dispense: 30 tablet; Refill: 0  -     X-Ray Elbow Complete 3 view Right; Future; Expected date: 08/26/2022    Essential hypertension   Controlled     Bursitis vs cellulitis vs gout     Avoid other NSAIDs with Naproxen  Rest, ice, elevate, compression   ES Tylenol prn     Monitor BP at home     Virtual f/u on Monday to ensure resolution     Discussed worsening signs/symptoms and when to return to clinic or go to ED.   Patient expresses understanding and agrees with treatment plan.

## 2022-08-28 ENCOUNTER — HOSPITAL ENCOUNTER (EMERGENCY)
Facility: HOSPITAL | Age: 37
Discharge: HOME | End: 2022-08-28
Attending: EMERGENCY MEDICINE
Payer: COMMERCIAL

## 2022-08-28 VITALS
WEIGHT: 167.88 LBS | BODY MASS INDEX: 31.72 KG/M2 | SYSTOLIC BLOOD PRESSURE: 130 MMHG | TEMPERATURE: 98 F | DIASTOLIC BLOOD PRESSURE: 80 MMHG | HEART RATE: 107 BPM | OXYGEN SATURATION: 100 % | RESPIRATION RATE: 18 BRPM

## 2022-08-28 DIAGNOSIS — M79.89 ARM SWELLING: Primary | ICD-10-CM

## 2022-08-28 DIAGNOSIS — L03.113 CELLULITIS OF RIGHT ARM: ICD-10-CM

## 2022-08-28 LAB
ANION GAP SERPL CALC-SCNC: 13 MMOL/L (ref 8–16)
BASOPHILS # BLD AUTO: 0.02 K/UL (ref 0–0.2)
BASOPHILS NFR BLD: 0.3 % (ref 0–1.9)
BUN SERPL-MCNC: 12 MG/DL (ref 6–20)
CALCIUM SERPL-MCNC: 9 MG/DL (ref 8.7–10.5)
CHLORIDE SERPL-SCNC: 103 MMOL/L (ref 95–110)
CO2 SERPL-SCNC: 23 MMOL/L (ref 23–29)
CREAT SERPL-MCNC: 0.8 MG/DL (ref 0.5–1.4)
DIFFERENTIAL METHOD: ABNORMAL
EOSINOPHIL # BLD AUTO: 0 K/UL (ref 0–0.5)
EOSINOPHIL NFR BLD: 0.4 % (ref 0–8)
ERYTHROCYTE [DISTWIDTH] IN BLOOD BY AUTOMATED COUNT: 19.1 % (ref 11.5–14.5)
EST. GFR  (NO RACE VARIABLE): >60 ML/MIN/1.73 M^2
GLUCOSE SERPL-MCNC: 99 MG/DL (ref 70–110)
HCT VFR BLD AUTO: 30.3 % (ref 37–48.5)
HGB BLD-MCNC: 9.2 G/DL (ref 12–16)
IMM GRANULOCYTES # BLD AUTO: 0.03 K/UL (ref 0–0.04)
IMM GRANULOCYTES NFR BLD AUTO: 0.4 % (ref 0–0.5)
LACTATE SERPL-SCNC: 0.8 MMOL/L (ref 0.5–2.2)
LYMPHOCYTES # BLD AUTO: 0.6 K/UL (ref 1–4.8)
LYMPHOCYTES NFR BLD: 8 % (ref 18–48)
MCH RBC QN AUTO: 21.5 PG (ref 27–31)
MCHC RBC AUTO-ENTMCNC: 30.4 G/DL (ref 32–36)
MCV RBC AUTO: 71 FL (ref 82–98)
MONOCYTES # BLD AUTO: 0.6 K/UL (ref 0.3–1)
MONOCYTES NFR BLD: 6.9 % (ref 4–15)
NEUTROPHILS # BLD AUTO: 6.7 K/UL (ref 1.8–7.7)
NEUTROPHILS NFR BLD: 84 % (ref 38–73)
NRBC BLD-RTO: 0 /100 WBC
PLATELET # BLD AUTO: 174 K/UL (ref 150–450)
PMV BLD AUTO: 11.1 FL (ref 9.2–12.9)
POTASSIUM SERPL-SCNC: 4.6 MMOL/L (ref 3.5–5.1)
PROCALCITONIN SERPL IA-MCNC: 0.03 NG/ML
RBC # BLD AUTO: 4.28 M/UL (ref 4–5.4)
SODIUM SERPL-SCNC: 139 MMOL/L (ref 136–145)
WBC # BLD AUTO: 7.96 K/UL (ref 3.9–12.7)

## 2022-08-28 PROCEDURE — 80048 BASIC METABOLIC PNL TOTAL CA: CPT | Performed by: PHYSICIAN ASSISTANT

## 2022-08-28 PROCEDURE — 83605 ASSAY OF LACTIC ACID: CPT | Performed by: EMERGENCY MEDICINE

## 2022-08-28 PROCEDURE — 84145 PROCALCITONIN (PCT): CPT | Performed by: EMERGENCY MEDICINE

## 2022-08-28 PROCEDURE — 63600175 PHARM REV CODE 636 W HCPCS: Performed by: EMERGENCY MEDICINE

## 2022-08-28 PROCEDURE — 96365 THER/PROPH/DIAG IV INF INIT: CPT

## 2022-08-28 PROCEDURE — 85025 COMPLETE CBC W/AUTO DIFF WBC: CPT | Performed by: PHYSICIAN ASSISTANT

## 2022-08-28 PROCEDURE — 87040 BLOOD CULTURE FOR BACTERIA: CPT | Performed by: EMERGENCY MEDICINE

## 2022-08-28 PROCEDURE — 25000003 PHARM REV CODE 250: Performed by: EMERGENCY MEDICINE

## 2022-08-28 PROCEDURE — 99285 EMERGENCY DEPT VISIT HI MDM: CPT | Mod: 25

## 2022-08-28 RX ADMIN — PIPERACILLIN AND TAZOBACTAM 4.5 G: 4; .5 INJECTION, POWDER, LYOPHILIZED, FOR SOLUTION INTRAVENOUS; PARENTERAL at 08:08

## 2022-08-28 NOTE — FIRST PROVIDER EVALUATION
Medical screening exam completed.  I have conducted a focused provider triage encounter, findings are as follows:    Brief history of present illness: redness and swelling to right arm near elbow and into forearm starting last Wednesday. Seen Friday and started on naproxen and bactrim which hasn't made a difference. States not painful. Doesn't itch. Just redness and swelling    There were no vitals filed for this visit.    Pertinent physical exam:  Erythema and swelling noted right upper extremity near and around elbow extending into forearm. Nontender.      Brief workup plan:  labs, us    Preliminary workup initiated; this workup will be continued and followed by the physician or advanced practice provider that is assigned to the patient when roomed.

## 2022-08-28 NOTE — ED PROVIDER NOTES
SCRIBE #1 NOTE: I, Jackie Block, am scribing for, and in the presence of, Cesar Moreno Jr., MD. I have scribed the entire note.       History     Chief Complaint   Patient presents with    Arm Swelling     Right forearm swelling and redness x 5.  Pt was seen by an [facility] on-call doctor on Friday who said to come to the ED if the complaint worsened.  Pt has been taking antibiotics without improvement.     Review of patient's allergies indicates:   Allergen Reactions    Latex, natural rubber Rash         History of Present Illness     HPI    8/28/2022, 6:20 PM  History obtained from the patient      History of Present Illness: Shania Tapia is a 37 y.o. female patient who presents to the Emergency Department for evaluation of swollen right arm from wrist to elbow x5 days. The arm is red but pt claims it is not painful. Pt was seen by doctor on Friday who said to come to the ED if the swelling worsened. Pt has been taking antibiotics w/o improvement. Symptoms are constant and moderate in severity. No mitigating or exacerbating factors reported. Patient denies any fever, chills, N/V/D, and all other sxs at this time. No further complaints or concerns at this time.       Arrival mode: Personal vehicle    PCP: Flavia Fink DO        Past Medical History:  Past Medical History:   Diagnosis Date    Abnormal Pap smear     Abnormal Pap smear of vagina     Anxiety     Hypertension        Past Surgical History:  Past Surgical History:   Procedure Laterality Date    CERVICAL BIOPSY      Conization          Family History:  Family History   Problem Relation Age of Onset    Diabetes Paternal Grandmother     Hypertension Father     Heart attack Father     Breast cancer Neg Hx     Colon cancer Neg Hx     Ovarian cancer Neg Hx        Social History:  Social History     Tobacco Use    Smoking status: Never    Smokeless tobacco: Never   Substance and Sexual Activity    Alcohol use: No    Drug use: No    Sexual activity:  Yes     Partners: Male     Birth control/protection: None        Review of Systems     Review of Systems   Constitutional:  Negative for chills and fever.   HENT:  Negative for sore throat.    Respiratory:  Negative for shortness of breath.    Cardiovascular:  Negative for chest pain.   Gastrointestinal:  Negative for diarrhea, nausea and vomiting.   Genitourinary:  Negative for dysuria.   Musculoskeletal:  Negative for back pain.        (+) Right arm swelling     Skin:  Negative for rash.   Neurological:  Negative for weakness.   Hematological:  Does not bruise/bleed easily.   All other systems reviewed and are negative.     Physical Exam     Initial Vitals [08/28/22 1741]   BP Pulse Resp Temp SpO2   (!) 149/81 (!) 126 18 98.1 °F (36.7 °C) 99 %      MAP       --          Physical Exam  Nursing Notes and Vital Signs Reviewed.  Constitutional: Patient is in no acute distress. Well-developed and well-nourished.  Head: Atraumatic. Normocephalic.  Eyes:  EOM intact. Conjunctivae are not pale. No scleral icterus.  ENT: Mucous membranes are moist. Oropharynx is clear and symmetric.    Neck: Supple. Full ROM.   Cardiovascular: Regular rate. Regular rhythm. No murmurs, rubs, or gallops. Distal pulses are 2+ and symmetric.  Pulmonary/Chest: No respiratory distress. Clear to auscultation bilaterally. No wheezing or rales.  Abdominal: Soft and non-distended.  There is no tenderness.  No rebound, guarding, or rigidity.   Musculoskeletal: Moves all extremities. No obvious deformities. No edema. Swollen/ red right arm from wrist to elbow.  Skin: Warm and dry.  Neurological:  Alert, awake, and appropriate.  Normal speech.  No acute focal neurological deficits are appreciated.  Psychiatric: Normal affect. Good eye contact. Appropriate in content.     ED Course   Procedures  ED Vital Signs:  Vitals:    08/28/22 1741 08/28/22 1944 08/28/22 2004   BP: (!) 149/81 123/79 130/80   Pulse: (!) 126 96 97   Resp: 18 16 18   Temp: 98.1 °F  (36.7 °C)     TempSrc: Oral     SpO2: 99% 100%    Weight: 76.1 kg (167 lb 14.1 oz)         Abnormal Lab Results:  Labs Reviewed   CBC W/ AUTO DIFFERENTIAL - Abnormal; Notable for the following components:       Result Value    Hemoglobin 9.2 (*)     Hematocrit 30.3 (*)     MCV 71 (*)     MCH 21.5 (*)     MCHC 30.4 (*)     RDW 19.1 (*)     Lymph # 0.6 (*)     Gran % 84.0 (*)     Lymph % 8.0 (*)     All other components within normal limits   CULTURE, BLOOD   CULTURE, BLOOD   BASIC METABOLIC PANEL   LACTIC ACID, PLASMA   PROCALCITONIN        All Lab Results:  Results for orders placed or performed during the hospital encounter of 08/28/22   CBC auto differential   Result Value Ref Range    WBC 7.96 3.90 - 12.70 K/uL    RBC 4.28 4.00 - 5.40 M/uL    Hemoglobin 9.2 (L) 12.0 - 16.0 g/dL    Hematocrit 30.3 (L) 37.0 - 48.5 %    MCV 71 (L) 82 - 98 fL    MCH 21.5 (L) 27.0 - 31.0 pg    MCHC 30.4 (L) 32.0 - 36.0 g/dL    RDW 19.1 (H) 11.5 - 14.5 %    Platelets 174 150 - 450 K/uL    MPV 11.1 9.2 - 12.9 fL    Immature Granulocytes 0.4 0.0 - 0.5 %    Gran # (ANC) 6.7 1.8 - 7.7 K/uL    Immature Grans (Abs) 0.03 0.00 - 0.04 K/uL    Lymph # 0.6 (L) 1.0 - 4.8 K/uL    Mono # 0.6 0.3 - 1.0 K/uL    Eos # 0.0 0.0 - 0.5 K/uL    Baso # 0.02 0.00 - 0.20 K/uL    nRBC 0 0 /100 WBC    Gran % 84.0 (H) 38.0 - 73.0 %    Lymph % 8.0 (L) 18.0 - 48.0 %    Mono % 6.9 4.0 - 15.0 %    Eosinophil % 0.4 0.0 - 8.0 %    Basophil % 0.3 0.0 - 1.9 %    Differential Method Automated    Basic metabolic panel   Result Value Ref Range    Sodium 139 136 - 145 mmol/L    Potassium 4.6 3.5 - 5.1 mmol/L    Chloride 103 95 - 110 mmol/L    CO2 23 23 - 29 mmol/L    Glucose 99 70 - 110 mg/dL    BUN 12 6 - 20 mg/dL    Creatinine 0.8 0.5 - 1.4 mg/dL    Calcium 9.0 8.7 - 10.5 mg/dL    Anion Gap 13 8 - 16 mmol/L    eGFR >60 >60 mL/min/1.73 m^2   Lactic acid, plasma   Result Value Ref Range    Lactate (Lactic Acid) 0.8 0.5 - 2.2 mmol/L         Imaging Results:  Imaging Results                US Soft Tissue Upper Extremity, Right (Final result)  Result time 08/28/22 18:41:41   Procedure changed from US Upper Extremity Veins Right     Final result by Bao Juan MD (08/28/22 18:41:41)                   Impression:      As above.    This report was flagged in Epic as abnormal.      Electronically signed by: Bao Juan  Date:    08/28/2022  Time:    18:41               Narrative:    EXAMINATION:  US SOFT TISSUE, UPPER EXTREMITY, RIGHT    CLINICAL HISTORY:  rue redness and swelling;    TECHNIQUE:  Grayscale and color Doppler ultrasound of the area of interest.    COMPARISON:  None    FINDINGS:  Extensive soft tissue edema and local hypervascularity and soft tissue swelling but without focal drainable fluid collection.                                              The Emergency Provider reviewed the vital signs and test results, which are outlined above.     ED Discussion       8:01 PM: Reassessed pt at this time. Discussed with pt all pertinent ED information and results. Discussed pt dx and plan of tx. Gave pt all f/u and return to the ED instructions. All questions and concerns were addressed at this time. Pt expresses understanding of information and instructions, and is comfortable with plan to discharge. Pt is stable for discharge.    I discussed with patient and/or family/caretaker that evaluation in the ED does not suggest any emergent or life threatening medical conditions requiring immediate intervention beyond what was provided in the ED, and I believe patient is safe for discharge.  Regardless, an unremarkable evaluation in the ED does not preclude the development or presence of a serious of life threatening condition. As such, patient was instructed to return immediately for any worsening or change in current symptoms.         Medical Decision Making:   Clinical Tests:   Lab Tests: Ordered and Reviewed  Radiological Study: Ordered and Reviewed         ED  Medication(s):  Medications   piperacillin-tazobactam 4.5 g in dextrose 5 % 100 mL IVPB (ready to mix system) (4.5 g Intravenous New Bag 8/28/22 2002)       New Prescriptions    No medications on file        Follow-up Information       Flavia Fink DO.    Specialty: Internal Medicine  Contact information:  [address] [place] [address] DR [place]isha [place] [id number]  [phone]                                 Scribe Attestation:   Scribe #1: I performed the above scribed service and the documentation accurately describes the services I performed. I attest to the accuracy of the note.     Attending:   Physician Attestation Statement for Scribe #1: I, Cesar Moreno Jr., MD, personally performed the services described in this documentation, as scribed by Jackie Block, in my presence, and it is both accurate and complete.           Clinical Impression       ICD-10-CM ICD-9-CM   1. Arm swelling  M79.89 729.81   2. Cellulitis of right arm  L03.113 682.3       Disposition:   Disposition: Discharged  Condition: Stable       Cesar Moreno Jr., MD  08/28/22 2010

## 2022-08-30 ENCOUNTER — HOSPITAL ENCOUNTER (EMERGENCY)
Facility: HOSPITAL | Age: 37
Discharge: HOME | End: 2022-08-30
Attending: EMERGENCY MEDICINE
Payer: COMMERCIAL

## 2022-08-30 VITALS
RESPIRATION RATE: 20 BRPM | OXYGEN SATURATION: 100 % | HEART RATE: 114 BPM | TEMPERATURE: 98 F | DIASTOLIC BLOOD PRESSURE: 84 MMHG | SYSTOLIC BLOOD PRESSURE: 151 MMHG

## 2022-08-30 DIAGNOSIS — L03.113 CELLULITIS OF RIGHT ARM: Primary | ICD-10-CM

## 2022-08-30 PROCEDURE — 99283 EMERGENCY DEPT VISIT LOW MDM: CPT

## 2022-08-30 RX ORDER — AMOXICILLIN AND CLAVULANATE POTASSIUM 875; 125 MG/1; MG/1
1 TABLET, FILM COATED ORAL 2 TIMES DAILY
Qty: 20 TABLET | Refills: 0 | Status: SHIPPED | OUTPATIENT
Start: 2022-08-30 | End: 2022-09-09

## 2022-08-30 NOTE — ED PROVIDER NOTES
SCRIBE #1 NOTE: IKim, am scribing for, and in the presence of, Cesar Moreno Jr., MD. I have scribed the entire note.      History      Chief Complaint   Patient presents with    Rash     Dx with cellulitis here yesterday, here to f/u with dr moreno       Review of patient's allergies indicates:   Allergen Reactions    Latex, natural rubber Rash        HPI   HPI    8/30/2022, 4:49 PM   History obtained from the patient      History of Present Illness: Shania Tapia is a 37 y.o. female patient with a PMHx of HTN who presents to the Emergency Department for an evaluation of RUE redness that is tracking up her arm which onset gradually 1 week PTA. Pt was prescribed Bactrim by her PCP on 8/26/2022 for this complaint. On 8/28/2022, the pt was seen in the ED for this complaint, discharged, and advised to come back to the ED for an evaluation in 2 days. Today, the pt returned to the ED for an evaluation and because the redness has tracked up to the mid-tricep. Symptoms are constant and moderate in severity. No mitigating or exacerbating factors reported. Associated sxs include RUE swelling from the mid-forearm to the mid-tricep. Patient denies any fever, chills, N/V/D, CP, SOB, weakness, and all other sxs at this time. No further complaints or concerns at this time.         Arrival mode: Personal vehicle    PCP: Flavia Fink DO       Past Medical History:  Past Medical History:   Diagnosis Date    Abnormal Pap smear     Abnormal Pap smear of vagina     Anxiety     Hypertension        Past Surgical History:  Past Surgical History:   Procedure Laterality Date    CERVICAL BIOPSY      Conization          Family History:  Family History   Problem Relation Age of Onset    Diabetes Paternal Grandmother     Hypertension Father     Heart attack Father     Breast cancer Neg Hx     Colon cancer Neg Hx     Ovarian cancer Neg Hx        Social History:  Social History     Tobacco Use    Smoking status: Never     Smokeless tobacco: Never   Substance and Sexual Activity    Alcohol use: No    Drug use: No    Sexual activity: Yes     Partners: Male     Birth control/protection: None       ROS   Review of Systems   Constitutional:  Negative for chills and fever.   HENT:  Negative for sore throat.    Respiratory:  Negative for shortness of breath.    Cardiovascular:  Negative for chest pain.   Gastrointestinal:  Negative for diarrhea, nausea and vomiting.   Genitourinary:  Negative for dysuria.   Musculoskeletal:  Negative for back pain.        (+)  RUE swelling from the mid-foreamr to the mid-tricep   Skin:  Positive for color change (RUE redness from the mid-forearm to the mid-tricep). Negative for rash.   Neurological:  Negative for weakness.   Hematological:  Does not bruise/bleed easily.   All other systems reviewed and are negative.    Physical Exam      Initial Vitals [08/30/22 1658]   BP Pulse Resp Temp SpO2   (!) 179/83 (!) 137 20 98 °F (36.7 °C) 100 %      MAP       --          Physical Exam  Nursing Notes and Vital Signs Reviewed.  Constitutional: Patient is in no acute distress. Well-developed and well-nourished.  Head: Atraumatic. Normocephalic.  Eyes: PERRL. EOM intact. Conjunctivae are not pale. No scleral icterus.  ENT: Mucous membranes are moist. Oropharynx is clear and symmetric.    Neck: Supple. Full ROM. No lymphadenopathy.  Cardiovascular: Regular rate. Regular rhythm. No murmurs, rubs, or gallops. Distal pulses are 2+ and symmetric.  Pulmonary/Chest: No respiratory distress. Clear to auscultation bilaterally. No wheezing or rales.  Abdominal: Soft and non-distended.  There is no tenderness.  No rebound, guarding, or rigidity.   Musculoskeletal: Moves all extremities. No obvious deformities. No edema. Pt has no joint pain in the RUE.  Skin: Warm and dry. There is redness from the R mid-forearm to the R mid-tricep area.   Neurological:  Alert, awake, and appropriate.  Normal speech.  No acute focal  neurological deficits are appreciated.  Psychiatric: Normal affect. Good eye contact. Appropriate in content.    ED Course    Procedures  ED Vital Signs:  Vitals:    08/30/22 1658 08/30/22 1703 08/30/22 1710   BP: (!) 179/83 (!) 151/84    Pulse: (!) 137  (!) 114   Resp: 20     Temp: 98 °F (36.7 °C)     TempSrc: Oral     SpO2: 100%         Abnormal Lab Results:  Labs Reviewed - No data to display         Imaging Results:  Imaging Results    None                 The Emergency Provider reviewed the vital signs and test results, which are outlined above.    ED Discussion     4:47 PM: Reassessed pt at this time.  Discussed with pt all pertinent ED information and results. Discussed pt dx and plan of tx. Gave pt all f/u and return to the ED instructions. All questions and concerns were addressed at this time. Pt expresses understanding of information and instructions, and is comfortable with plan to discharge. Pt is stable for discharge.    I discussed with patient and/or family/caretaker that evaluation in the ED does not suggest any emergent or life threatening medical conditions requiring immediate intervention beyond what was provided in the ED, and I believe patient is safe for discharge.  Regardless, an unremarkable evaluation in the ED does not preclude the development or presence of a serious of life threatening condition. As such, patient was instructed to return immediately for any worsening or change in current symptoms.           ED Medication(s):  Medications - No data to display    Discharge Medication List as of 8/30/2022  4:49 PM        START taking these medications    Details   amoxicillin-clavulanate 875-125mg (AUGMENTIN) 875-125 mg per tablet Take 1 tablet by mouth 2 (two) times daily. for 10 days, Starting Tue 8/30/2022, Until Fri 9/9/2022, Print                Medication List        START taking these medications      amoxicillin-clavulanate 875-125mg 875-125 mg per tablet  Commonly known as:  AUGMENTIN  Take 1 tablet by mouth 2 (two) times daily. for 10 days            ASK your doctor about these medications      fluconazole 150 MG Tab  Commonly known as: DIFLUCAN     hydroCHLOROthiazide 12.5 MG Tab  Commonly known as: HYDRODIURIL  Take 1 tablet (12.5 mg total) by mouth daily as needed (swelling).     hydrOXYzine HCL 25 MG tablet  Commonly known as: ATARAX  Take 1 tablet (25 mg total) by mouth 3 (three) times daily as needed for Anxiety.     naproxen 500 MG tablet  Commonly known as: NAPROSYN  Take 1 tablet (500 mg total) by mouth 2 (two) times daily as needed (pain).     NIFEdipine 30 MG (OSM) 24 hr tablet  Commonly known as: PROCARDIA-XL  Take 1 tablet (30 mg total) by mouth once daily.     sulfamethoxazole-trimethoprim 800-160mg 800-160 mg Tab  Commonly known as: BACTRIM DS  Take 1 tablet by mouth 2 (two) times daily. for 7 days               Where to Get Your Medications        These medications were sent to [facility] [address] [place] [address]      Hours: Mon-Fri, 8a-5:30p Phone: [phone]   amoxicillin-clavulanate 875-125mg 875-125 mg per tablet              Follow-up Information       Flavia Fink DO.    Specialty: Internal Medicine  Contact information:  [address] [place] [address] [address] Kisha [place] [address]  [phone]               [facility] - Emergency Dept..    Specialty: Emergency Medicine  Why: If symptoms worsen  Contact information:  [address] [facility] [id number] [phone]                             Medical Decision Making              Scribe Attestation:   Scribe #1: I performed the above scribed service and the documentation accurately describes the services I performed. I attest to the accuracy of the note.    Attending:   Physician Attestation Statement for Scribe #1: I, Cesar Moreno Jr., MD, personally performed the services described in this documentation, as scribed by Kim Larson, in my presence, and it is  both accurate and complete.          Clinical Impression       ICD-10-CM ICD-9-CM   1. Cellulitis of right arm  L03.113 682.3       Disposition:   Disposition: Discharged  Condition: Stable       Cesar Moreno Jr., MD  08/30/22 1956

## 2022-08-30 NOTE — ED NOTES
Dr anthony jauregui [place]   History of Present Illness


H&P Date: 18


Chief Complaint: Abdominal cramping





This is a 18-year-old  1 young woman who is approximately 23 weeks 

gestation.  She presented to labor and delivery triage with vague complaints of 

abdominal cramping.  She was ruled out for  labor however was noted to 

have significantly elevated blood pressures in the 170s over 80s.  She had no 

prior history of hypertension and was therefore admitted for further blood 

pressure monitoring and assessment.





She denies vaginal bleeding, abdominal pain, leakage of fluids, headaches, 

visual changes, heartburn.  She does have good fetal movement.  Her obstetric 

history is significant for marijuana use earlier in the pregnancy however no 

other drug or alcohol history.  She does note that her blood pressures were 

somewhat elevated on her last 2 visits in the office but had never been as high 

as they are upon presentation.





Review of Systems


Constitutional: Denies chills, Denies chronic headaches, Denies fatigue, Denies 

fever


Cardiovascular: Denies chest pain, Denies rapid heart beat, Denies shortness of 

breath


Respiratory: Denies cough, Denies dyspnea


Gastrointestinal: Denies abdominal pain, Denies BRBPR, Denies change in bowel 

habits, Denies diarrhea


Genitourinary: Denies abnormal vaginal bleeding, Denies dysuria, Denies vaginal 

discharge


Musculoskeletal: Denies low back pain


Integumentary: Denies rash


Neurological: Denies headaches, Denies visual changes


Psychiatric: Denies confusion


Hematologic/Lymphatic: Denies easy bleeding, Denies easy bruising





Past Medical History


Past Medical History: Asthma


History of Any Multi-Drug Resistant Organisms: None Reported


Past Surgical History: No Surgical Hx Reported, Tonsillectomy


Past Anesthesia/Blood Transfusion Reactions: No Reported Reaction


Past Psychological History: ADD/ADHD, Anxiety


Smoking Status: Never smoker


Past Alcohol Use History: None Reported


Past Drug Use History: Marijuana





- Past Family History


  ** Father


Family Medical History: No Reported History





Medications and Allergies


 Home Medications











 Medication  Instructions  Recorded  Confirmed  Type


 


Albuterol Sulfate [Proair Hfa] 2 puff INHALATION [id number] PRN 03/14/15 04/19/18 

History


 


Pnv No.95/Ferrous Fum/Folic AC 1 tab PO HS 18 History





[Prenatal Multivitamin Tablet]    











 Allergies











Allergy/AdvReac Type Severity Reaction Status Date / Time


 


No Known Allergies Allergy   Verified 18 20:04














Exam





- Vital Signs


Vital signs: 


 Vital Signs











  Temp Pulse Resp BP


 


 18 20:04  98.2 F  101 H  16  179/74








 Intake and Output











 18





 22:59 06:59 14:59


 


Intake Total  300 


 


Balance  300 


 


Intake:   


 


  Oral  300 


 


Other:   


 


  # Voids  2 


 


  Weight 58.513 kg  














Upon admission this is a pleasant, young, visibly gravid  female.  

HEENT exam is unremarkable.  The lungs are clear and her breathing is 

unlabored.  Heart is a regular rate and rhythm.  Abdomen is gravid, soft and 

nontender.  Cervix is closed, thick and high.  She has no lower extremity 

swelling and has 2+ DTRs.





Results


Result Diagrams: 


 18 07:39





 18 07:39


 Abnormal Lab Results - Last 24 Hours (Table)











  18 Range/Units





  20:44 21:09 21:09 


 


RBC     (3.80-5.40)  m/uL


 


Hgb    11.1 L  (11.4-16.0)  gm/dL


 


Hct    33.8 L  (34.0-46.0)  %


 


Uric Acid   2.0 L   (3.7-7.4)  mg/dL


 


Lactate Dehydrogenase     (313-618)  U/L


 


U Marijuana (THC) Screen  Detected H    (NotDetected)  














  18 Range/Units





  07:39 07:39 


 


RBC  3.52 L   (3.80-5.40)  m/uL


 


Hgb  9.5 L D   (11.4-16.0)  gm/dL


 


Hct  28.7 L   (34.0-46.0)  %


 


Uric Acid   2.2 L  (3.7-7.4)  mg/dL


 


Lactate Dehydrogenase   252 L  (313-618)  U/L


 


U Marijuana (THC) Screen    (NotDetected)  














Assessment and Plan


(1) 23 weeks gestation of pregnancy


Current Visit: Yes   Status: Acute   Code(s): Z3A.23 - 23 WEEKS GESTATION OF 

PREGNANCY   SNOMED Code(s): 35345789


   





(2) Hypertension affecting pregnancy in second trimester


Narrative/Plan: 


Initial laboratory data was within normal limits with no evidence of 

preeclampsia by lab values.  She did receive an initial dose of 200 mg of 

labetalol and her blood pressures have remained much slower since that time.  

She has no other clinical signs of developing preeclampsia.  Her drug screen 

was positive for marijuana only.  At this time we will continue hospital 

observation and serial blood pressures until her 24-hour urine protein is 

completed.  If that is normal she will likely be discharged with close follow-

up in the outpatient setting.


Current Visit: Yes   Status: Acute   Code(s): O16.2 - UNSPECIFIED MATERNAL 

HYPERTENSION, SECOND TRIMESTER   SNOMED Code(s): 565636504

## 2022-08-31 ENCOUNTER — TELEPHONE (OUTPATIENT)
Dept: INTERNAL MEDICINE | Facility: CLINIC | Age: 37
End: 2022-08-31
Payer: COMMERCIAL

## 2022-08-31 NOTE — TELEPHONE ENCOUNTER
Called pt to discuss scheduling a follow-up visit as well as X-Ray results, pt was verbally understanding, appt was made.

## 2022-09-03 LAB
BACTERIA BLD CULT: NORMAL
BACTERIA BLD CULT: NORMAL

## 2022-10-20 ENCOUNTER — OFFICE VISIT (OUTPATIENT)
Dept: INTERNAL MEDICINE | Facility: CLINIC | Age: 37
End: 2022-10-20
Payer: COMMERCIAL

## 2022-10-20 ENCOUNTER — LAB VISIT (OUTPATIENT)
Dept: LAB | Facility: HOSPITAL | Age: 37
End: 2022-10-20
Attending: INTERNAL MEDICINE
Payer: COMMERCIAL

## 2022-10-20 VITALS
WEIGHT: 169.56 LBS | BODY MASS INDEX: 32.01 KG/M2 | RESPIRATION RATE: 18 BRPM | TEMPERATURE: 98 F | HEART RATE: 78 BPM | SYSTOLIC BLOOD PRESSURE: 132 MMHG | OXYGEN SATURATION: 96 % | HEIGHT: 61 IN | DIASTOLIC BLOOD PRESSURE: 80 MMHG

## 2022-10-20 DIAGNOSIS — Z00.00 ANNUAL PHYSICAL EXAM: ICD-10-CM

## 2022-10-20 DIAGNOSIS — Z00.00 ANNUAL PHYSICAL EXAM: Primary | ICD-10-CM

## 2022-10-20 DIAGNOSIS — I10 ESSENTIAL HYPERTENSION: ICD-10-CM

## 2022-10-20 DIAGNOSIS — R60.9 SWELLING: ICD-10-CM

## 2022-10-20 LAB
ALBUMIN SERPL BCP-MCNC: 4.3 G/DL (ref 3.5–5.2)
ALP SERPL-CCNC: 75 U/L (ref 55–135)
ALT SERPL W/O P-5'-P-CCNC: 14 U/L (ref 10–44)
ANION GAP SERPL CALC-SCNC: 10 MMOL/L (ref 8–16)
AST SERPL-CCNC: 20 U/L (ref 10–40)
BASOPHILS # BLD AUTO: 0.02 K/UL (ref 0–0.2)
BASOPHILS NFR BLD: 0.8 % (ref 0–1.9)
BILIRUB SERPL-MCNC: 0.6 MG/DL (ref 0.1–1)
BUN SERPL-MCNC: 11 MG/DL (ref 6–20)
CALCIUM SERPL-MCNC: 9.9 MG/DL (ref 8.7–10.5)
CHLORIDE SERPL-SCNC: 101 MMOL/L (ref 95–110)
CHOLEST SERPL-MCNC: 201 MG/DL (ref 120–199)
CHOLEST/HDLC SERPL: 3.9 {RATIO} (ref 2–5)
CO2 SERPL-SCNC: 25 MMOL/L (ref 23–29)
CREAT SERPL-MCNC: 0.9 MG/DL (ref 0.5–1.4)
DIFFERENTIAL METHOD: ABNORMAL
EOSINOPHIL # BLD AUTO: 0 K/UL (ref 0–0.5)
EOSINOPHIL NFR BLD: 1.2 % (ref 0–8)
ERYTHROCYTE [DISTWIDTH] IN BLOOD BY AUTOMATED COUNT: 18.2 % (ref 11.5–14.5)
EST. GFR  (NO RACE VARIABLE): >60 ML/MIN/1.73 M^2
FERRITIN SERPL-MCNC: 5 NG/ML (ref 20–300)
GLUCOSE SERPL-MCNC: 92 MG/DL (ref 70–110)
HCT VFR BLD AUTO: 36.5 % (ref 37–48.5)
HDLC SERPL-MCNC: 51 MG/DL (ref 40–75)
HDLC SERPL: 25.4 % (ref 20–50)
HGB BLD-MCNC: 10.4 G/DL (ref 12–16)
IMM GRANULOCYTES # BLD AUTO: 0 K/UL (ref 0–0.04)
IMM GRANULOCYTES NFR BLD AUTO: 0 % (ref 0–0.5)
IRON SERPL-MCNC: 21 UG/DL (ref 30–160)
LDLC SERPL CALC-MCNC: 134.8 MG/DL (ref 63–159)
LYMPHOCYTES # BLD AUTO: 0.9 K/UL (ref 1–4.8)
LYMPHOCYTES NFR BLD: 36.1 % (ref 18–48)
MCH RBC QN AUTO: 22.1 PG (ref 27–31)
MCHC RBC AUTO-ENTMCNC: 28.5 G/DL (ref 32–36)
MCV RBC AUTO: 78 FL (ref 82–98)
MONOCYTES # BLD AUTO: 0.2 K/UL (ref 0.3–1)
MONOCYTES NFR BLD: 9 % (ref 4–15)
NEUTROPHILS # BLD AUTO: 1.4 K/UL (ref 1.8–7.7)
NEUTROPHILS NFR BLD: 52.9 % (ref 38–73)
NONHDLC SERPL-MCNC: 150 MG/DL
NRBC BLD-RTO: 0 /100 WBC
PLATELET # BLD AUTO: 348 K/UL (ref 150–450)
PMV BLD AUTO: 11.9 FL (ref 9.2–12.9)
POTASSIUM SERPL-SCNC: 4 MMOL/L (ref 3.5–5.1)
PROT SERPL-MCNC: 7.8 G/DL (ref 6–8.4)
RBC # BLD AUTO: 4.7 M/UL (ref 4–5.4)
SATURATED IRON: 4 % (ref 20–50)
SODIUM SERPL-SCNC: 136 MMOL/L (ref 136–145)
TOTAL IRON BINDING CAPACITY: 531 UG/DL (ref 250–450)
TRANSFERRIN SERPL-MCNC: 359 MG/DL (ref 200–375)
TRIGL SERPL-MCNC: 76 MG/DL (ref 30–150)
TSH SERPL DL<=0.005 MIU/L-ACNC: 1.67 UIU/ML (ref 0.4–4)
WBC # BLD AUTO: 2.55 K/UL (ref 3.9–12.7)

## 2022-10-20 PROCEDURE — 3075F PR MOST RECENT SYSTOLIC BLOOD PRESS GE 130-139MM HG: ICD-10-PCS | Mod: CPTII,S$GLB,, | Performed by: INTERNAL MEDICINE

## 2022-10-20 PROCEDURE — 1159F MED LIST DOCD IN RCRD: CPT | Mod: CPTII,S$GLB,, | Performed by: INTERNAL MEDICINE

## 2022-10-20 PROCEDURE — 84466 ASSAY OF TRANSFERRIN: CPT | Performed by: INTERNAL MEDICINE

## 2022-10-20 PROCEDURE — 99395 PR PREVENTIVE VISIT,EST,18-39: ICD-10-PCS | Mod: S$GLB,,, | Performed by: INTERNAL MEDICINE

## 2022-10-20 PROCEDURE — 80061 LIPID PANEL: CPT | Performed by: INTERNAL MEDICINE

## 2022-10-20 PROCEDURE — 82728 ASSAY OF FERRITIN: CPT | Performed by: INTERNAL MEDICINE

## 2022-10-20 PROCEDURE — 3075F SYST BP GE 130 - 139MM HG: CPT | Mod: CPTII,S$GLB,, | Performed by: INTERNAL MEDICINE

## 2022-10-20 PROCEDURE — 84443 ASSAY THYROID STIM HORMONE: CPT | Performed by: INTERNAL MEDICINE

## 2022-10-20 PROCEDURE — 80053 COMPREHEN METABOLIC PANEL: CPT | Performed by: INTERNAL MEDICINE

## 2022-10-20 PROCEDURE — 85025 COMPLETE CBC W/AUTO DIFF WBC: CPT | Performed by: INTERNAL MEDICINE

## 2022-10-20 PROCEDURE — 36415 COLL VENOUS BLD VENIPUNCTURE: CPT | Performed by: INTERNAL MEDICINE

## 2022-10-20 PROCEDURE — 1160F PR REVIEW ALL MEDS BY PRESCRIBER/CLIN PHARMACIST DOCUMENTED: ICD-10-PCS | Mod: CPTII,S$GLB,, | Performed by: INTERNAL MEDICINE

## 2022-10-20 PROCEDURE — 3079F PR MOST RECENT DIASTOLIC BLOOD PRESSURE 80-89 MM HG: ICD-10-PCS | Mod: CPTII,S$GLB,, | Performed by: INTERNAL MEDICINE

## 2022-10-20 PROCEDURE — 3079F DIAST BP 80-89 MM HG: CPT | Mod: CPTII,S$GLB,, | Performed by: INTERNAL MEDICINE

## 2022-10-20 PROCEDURE — 99999 PR PBB SHADOW E&M-EST. PATIENT-LVL V: ICD-10-PCS | Mod: PBBFAC,,, | Performed by: INTERNAL MEDICINE

## 2022-10-20 PROCEDURE — 1160F RVW MEDS BY RX/DR IN RCRD: CPT | Mod: CPTII,S$GLB,, | Performed by: INTERNAL MEDICINE

## 2022-10-20 PROCEDURE — 99999 PR PBB SHADOW E&M-EST. PATIENT-LVL V: CPT | Mod: PBBFAC,,, | Performed by: INTERNAL MEDICINE

## 2022-10-20 PROCEDURE — 99395 PREV VISIT EST AGE 18-39: CPT | Mod: S$GLB,,, | Performed by: INTERNAL MEDICINE

## 2022-10-20 PROCEDURE — 1159F PR MEDICATION LIST DOCUMENTED IN MEDICAL RECORD: ICD-10-PCS | Mod: CPTII,S$GLB,, | Performed by: INTERNAL MEDICINE

## 2022-10-20 RX ORDER — HYDROCHLOROTHIAZIDE 12.5 MG/1
12.5 TABLET ORAL DAILY PRN
Qty: 90 TABLET | Refills: 3 | Status: ON HOLD | OUTPATIENT
Start: 2022-10-20 | End: 2023-07-12 | Stop reason: HOSPADM

## 2022-10-20 NOTE — PROGRESS NOTES
Shania Tapia  37 y.o. Black or  female  [id number]    Chief Complaint:  Chief Complaint   Patient presents with    Annual Exam       History of Present Illness:  Presents to the clinic for an annual exam.   HTN--stable on HCTZ and nifedipine.   She is fasting for labs today.     Laboratory   Lab Results   Component Value Date    WBC 7.96 08/28/2022    HGB 9.2 (L) 08/28/2022    HCT 30.3 (L) 08/28/2022     08/28/2022    CHOL 164 10/20/2021    TRIG 77 10/20/2021    HDL 45 10/20/2021    ALT 8 (L) 10/20/2021    AST 15 10/20/2021     08/28/2022    K 4.6 08/28/2022     08/28/2022    CREATININE 0.8 08/28/2022    BUN 12 08/28/2022    CO2 23 08/28/2022    TSH 1.072 10/20/2021     Review of Systems   Constitutional:  Negative for fever and malaise/fatigue.   Respiratory:  Negative for cough and shortness of breath.    Cardiovascular:  Negative for chest pain and leg swelling.   Gastrointestinal:  Negative for abdominal pain.   Neurological:  Negative for dizziness and headaches.   Psychiatric/Behavioral:  The patient is nervous/anxious.      The following were reviewed: Active problem list, medication list, allergies, family history, social history, and Health Maintenance.     History:  Past Medical History:   Diagnosis Date    Abnormal Pap smear     Abnormal Pap smear of vagina     Anemia     Anxiety     Hypertension      Past Surgical History:   Procedure Laterality Date    CERVICAL BIOPSY      Conization      Family History   Problem Relation Age of Onset    Diabetes Paternal Grandmother     Hypertension Father     Heart attack Father     Breast cancer Neg Hx     Colon cancer Neg Hx     Ovarian cancer Neg Hx      Social History     Socioeconomic History    Marital status:    Tobacco Use    Smoking status: Never    Smokeless tobacco: Never   Substance and Sexual Activity    Alcohol use: No    Drug use: No    Sexual activity: Yes     Partners: Male     Birth control/protection:  None     Social Determinants of Health     Financial Resource Strain: Low Risk     Difficulty of Paying Living Expenses: Not hard at all   Food Insecurity: No Food Insecurity    Worried About Running Out of Food in the Last Year: Never true    Ran Out of Food in the Last Year: Never true   Transportation Needs: No Transportation Needs    Lack of Transportation (Medical): No    Lack of Transportation (Non-Medical): No   Physical Activity: Unknown    Days of Exercise per Week: 2 days   Stress: Stress Concern Present    Feeling of Stress : To some extent   Social Connections: Unknown    Frequency of Communication with Friends and Family: More than three times a week    Frequency of Social Gatherings with Friends and Family: More than three times a week    Active Member of Clubs or Organizations: No    Attends Club or Organization Meetings: Patient refused    Marital Status:    Housing Stability: Low Risk     Unable to Pay for Housing in the Last Year: No    Number of Places Lived in the Last Year: 1    Unstable Housing in the Last Year: No     Patient Active Problem List   Diagnosis    Essential hypertension     Review of patient's allergies indicates:   Allergen Reactions    Latex Rash    Latex, natural rubber Rash       Health Maintenance  Health Maintenance Topics with due status: Not Due       Topic Last Completion Date    TETANUS VACCINE 10/07/2018    Cervical Cancer Screening 08/09/2021     Health Maintenance Due   Topic Date Due    COVID-19 Vaccine (3 - Booster for Pfizer series) 09/03/2021       Medications:  Current Outpatient Medications on File Prior to Visit   Medication Sig Dispense Refill    NIFEdipine (PROCARDIA-XL) 30 MG (OSM) 24 hr tablet Take 1 tablet (30 mg total) by mouth once daily. 90 tablet 2    hydroCHLOROthiazide (HYDRODIURIL) 12.5 MG Tab TAKE 1 TABLET (12.5 MG TOTAL) BY MOUTH DAILY AS NEEDED (SWELLING). 30 tablet 0     Medications have been reviewed and reconciled with patient at visit  today.    Exam:  Vitals:    10/20/22 0807   BP: 132/80   Pulse:    Resp:    Temp:      Weight: 76.9 kg (169 lb 8.5 oz)   Body mass index is 32.03 kg/m².      Physical Exam  Vitals reviewed.   Constitutional:       General: She is not in acute distress.     Appearance: She is well-developed. She is not ill-appearing.   Eyes:      General: No scleral icterus.  Cardiovascular:      Rate and Rhythm: Normal rate and regular rhythm.      Heart sounds: Normal heart sounds.   Pulmonary:      Effort: Pulmonary effort is normal. No respiratory distress.      Breath sounds: Normal breath sounds.   Abdominal:      General: Bowel sounds are normal.      Palpations: Abdomen is soft.   Musculoskeletal:      Right lower leg: No edema.      Left lower leg: No edema.   Skin:     General: Skin is warm and dry.   Neurological:      Mental Status: She is alert and oriented to person, place, and time.   Psychiatric:         Behavior: Behavior normal.       Assessment:  The primary encounter diagnosis was Annual physical exam. Diagnoses of Essential hypertension and Swelling were also pertinent to this visit.    Plan:  Annual physical exam  -     Counseled regarding age appropriate screenings and immunizations  -     Counseled regarding lifestyle modifications  -     CBC Auto Differential; Future; Expected date: 10/20/2022  -     Iron and TIBC; Future; Expected date: 10/20/2022  -     Ferritin; Future; Expected date: 10/20/2022  -     Comprehensive Metabolic Panel; Future; Expected date: 10/20/2022  -     TSH; Future  -     Lipid panel; Future; Expected date: 10/20/2022    Essential hypertension  -     refill hydroCHLOROthiazide (HYDRODIURIL) 12.5 MG Tab; Take 1 tablet (12.5 mg total) by mouth daily as needed (swelling).  Dispense: 90 tablet; Refill: 3  -     continue nifedipine     Swelling  -     refill hydroCHLOROthiazide (HYDRODIURIL) 12.5 MG Tab; Take 1 tablet (12.5 mg total) by mouth daily as needed (swelling).  Dispense: 90 tablet;  Refill: 3    Labs today.     Follow up in about 1 year (around 10/20/2023).

## 2022-11-27 ENCOUNTER — PATIENT MESSAGE (OUTPATIENT)
Dept: INTERNAL MEDICINE | Facility: CLINIC | Age: 37
End: 2022-11-27
Payer: COMMERCIAL

## 2023-06-13 ENCOUNTER — HOSPITAL ENCOUNTER (INPATIENT)
Facility: HOSPITAL | Age: 38
LOS: 6 days | Discharge: ANOTHER HOSPITAL | DRG: 871 | End: 2023-06-20
Attending: EMERGENCY MEDICINE | Admitting: INTERNAL MEDICINE
Payer: COMMERCIAL

## 2023-06-13 DIAGNOSIS — E87.8 ELECTROLYTE ABNORMALITY: ICD-10-CM

## 2023-06-13 DIAGNOSIS — J96.00 ACUTE RESPIRATORY FAILURE, UNSPECIFIED WHETHER WITH HYPOXIA OR HYPERCAPNIA: ICD-10-CM

## 2023-06-13 DIAGNOSIS — J18.9 PNEUMONIA OF RIGHT UPPER LOBE DUE TO INFECTIOUS ORGANISM: ICD-10-CM

## 2023-06-13 DIAGNOSIS — R74.8 HIGH SERUM OSMOLAR GAP: ICD-10-CM

## 2023-06-13 DIAGNOSIS — R56.9 WITNESSED SEIZURE: ICD-10-CM

## 2023-06-13 DIAGNOSIS — R06.4 HYPERVENTILATION: ICD-10-CM

## 2023-06-13 DIAGNOSIS — E87.6 HYPOKALEMIA: ICD-10-CM

## 2023-06-13 DIAGNOSIS — T51.1X1A: ICD-10-CM

## 2023-06-13 DIAGNOSIS — E87.20 METABOLIC ACIDOSIS: Primary | ICD-10-CM

## 2023-06-13 DIAGNOSIS — R00.0 TACHYCARDIA: ICD-10-CM

## 2023-06-13 DIAGNOSIS — R65.20 SEVERE SEPSIS: ICD-10-CM

## 2023-06-13 DIAGNOSIS — E83.39 HYPOPHOSPHATEMIA: ICD-10-CM

## 2023-06-13 DIAGNOSIS — J98.4 PNEUMONITIS: ICD-10-CM

## 2023-06-13 DIAGNOSIS — A41.9 SEVERE SEPSIS: ICD-10-CM

## 2023-06-13 DIAGNOSIS — E87.29 HIGH ANION GAP METABOLIC ACIDOSIS: ICD-10-CM

## 2023-06-13 LAB
ALBUMIN SERPL BCP-MCNC: 4.3 G/DL (ref 3.5–5.2)
ALLENS TEST: ABNORMAL
ALP SERPL-CCNC: 87 U/L (ref 55–135)
ALT SERPL W/O P-5'-P-CCNC: 24 U/L (ref 10–44)
AMPHET+METHAMPHET UR QL: NEGATIVE
ANION GAP SERPL CALC-SCNC: ABNORMAL MMOL/L (ref 8–16)
ANISOCYTOSIS BLD QL SMEAR: SLIGHT
APAP SERPL-MCNC: <3 UG/ML (ref 10–20)
AST SERPL-CCNC: 32 U/L (ref 10–40)
BARBITURATES UR QL SCN>200 NG/ML: NEGATIVE
BASOPHILS NFR BLD: 0 % (ref 0–1.9)
BENZODIAZ UR QL SCN>200 NG/ML: NEGATIVE
BILIRUB SERPL-MCNC: 0.6 MG/DL (ref 0.1–1)
BUN SERPL-MCNC: 8 MG/DL (ref 6–20)
BZE UR QL SCN: NEGATIVE
CALCIUM SERPL-MCNC: 8.9 MG/DL (ref 8.7–10.5)
CANNABINOIDS UR QL SCN: NEGATIVE
CHLORIDE SERPL-SCNC: 100 MMOL/L (ref 95–110)
CO2 SERPL-SCNC: <5 MMOL/L (ref 23–29)
CREAT SERPL-MCNC: 1.1 MG/DL (ref 0.5–1.4)
CREAT UR-MCNC: 40.1 MG/DL (ref 15–325)
D DIMER PPP IA.FEU-MCNC: 8.38 MG/L FEU
DELSYS: ABNORMAL
DIFFERENTIAL METHOD: ABNORMAL
EOSINOPHIL NFR BLD: 0 % (ref 0–8)
ERYTHROCYTE [DISTWIDTH] IN BLOOD BY AUTOMATED COUNT: 21.4 % (ref 11.5–14.5)
EST. GFR  (NO RACE VARIABLE): >60 ML/MIN/1.73 M^2
ETHANOL SERPL-MCNC: <10 MG/DL
FIO2: 21
GLUCOSE SERPL-MCNC: 171 MG/DL (ref 70–110)
HCO3 UR-SCNC: 2.8 MMOL/L (ref 24–28)
HCT VFR BLD AUTO: 34.5 % (ref 37–48.5)
HCV AB SERPL QL IA: NEGATIVE
HEP C VIRUS HOLD SPECIMEN: NORMAL
HGB BLD-MCNC: 9.9 G/DL (ref 12–16)
HIV 1+2 AB+HIV1 P24 AG SERPL QL IA: NEGATIVE
HYPOCHROMIA BLD QL SMEAR: ABNORMAL
IMM GRANULOCYTES # BLD AUTO: ABNORMAL K/UL (ref 0–0.04)
IMM GRANULOCYTES NFR BLD AUTO: ABNORMAL % (ref 0–0.5)
LACTATE SERPL-SCNC: 2.3 MMOL/L (ref 0.5–2.2)
LYMPHOCYTES NFR BLD: 1 % (ref 18–48)
MCH RBC QN AUTO: 21.5 PG (ref 27–31)
MCHC RBC AUTO-ENTMCNC: 28.7 G/DL (ref 32–36)
MCV RBC AUTO: 75 FL (ref 82–98)
METHADONE UR QL SCN>300 NG/ML: NEGATIVE
MODE: ABNORMAL
MONOCYTES NFR BLD: 6 % (ref 4–15)
NEUTROPHILS NFR BLD: 87 % (ref 38–73)
NEUTS BAND NFR BLD MANUAL: 6 %
NRBC BLD-RTO: 0 /100 WBC
OPIATES UR QL SCN: ABNORMAL
OVALOCYTES BLD QL SMEAR: ABNORMAL
PCO2 BLDA: 9.8 MMHG (ref 35–45)
PCP UR QL SCN>25 NG/ML: NEGATIVE
PH SMN: 7.07 [PH] (ref 7.35–7.45)
PLATELET # BLD AUTO: 122 K/UL (ref 150–450)
PLATELET BLD QL SMEAR: ABNORMAL
PMV BLD AUTO: ABNORMAL FL (ref 9.2–12.9)
PO2 BLDA: 108 MMHG (ref 80–100)
POC BE: -27 MMOL/L
POC SATURATED O2: 96 % (ref 95–100)
POTASSIUM SERPL-SCNC: 3.1 MMOL/L (ref 3.5–5.1)
PROT SERPL-MCNC: 9.1 G/DL (ref 6–8.4)
RBC # BLD AUTO: 4.61 M/UL (ref 4–5.4)
SALICYLATES SERPL-MCNC: <5 MG/DL (ref 15–30)
SAMPLE: ABNORMAL
SITE: ABNORMAL
SODIUM SERPL-SCNC: 134 MMOL/L (ref 136–145)
TOXICOLOGY INFORMATION: ABNORMAL
TROPONIN I SERPL DL<=0.01 NG/ML-MCNC: <0.006 NG/ML (ref 0–0.03)
TSH SERPL DL<=0.005 MIU/L-ACNC: 1.26 UIU/ML (ref 0.4–4)
WBC # BLD AUTO: 15.11 K/UL (ref 3.9–12.7)

## 2023-06-13 PROCEDURE — 63600175 PHARM REV CODE 636 W HCPCS: Performed by: EMERGENCY MEDICINE

## 2023-06-13 PROCEDURE — 82077 ASSAY SPEC XCP UR&BREATH IA: CPT | Performed by: EMERGENCY MEDICINE

## 2023-06-13 PROCEDURE — 86803 HEPATITIS C AB TEST: CPT | Performed by: EMERGENCY MEDICINE

## 2023-06-13 PROCEDURE — 80143 DRUG ASSAY ACETAMINOPHEN: CPT | Performed by: EMERGENCY MEDICINE

## 2023-06-13 PROCEDURE — 25000003 PHARM REV CODE 250: Performed by: EMERGENCY MEDICINE

## 2023-06-13 PROCEDURE — 93005 ELECTROCARDIOGRAM TRACING: CPT

## 2023-06-13 PROCEDURE — 80307 DRUG TEST PRSMV CHEM ANLYZR: CPT | Performed by: EMERGENCY MEDICINE

## 2023-06-13 PROCEDURE — 99900035 HC TECH TIME PER 15 MIN (STAT)

## 2023-06-13 PROCEDURE — 80053 COMPREHEN METABOLIC PANEL: CPT | Performed by: EMERGENCY MEDICINE

## 2023-06-13 PROCEDURE — 84484 ASSAY OF TROPONIN QUANT: CPT | Performed by: EMERGENCY MEDICINE

## 2023-06-13 PROCEDURE — 85027 COMPLETE CBC AUTOMATED: CPT | Performed by: EMERGENCY MEDICINE

## 2023-06-13 PROCEDURE — 87389 HIV-1 AG W/HIV-1&-2 AB AG IA: CPT | Performed by: EMERGENCY MEDICINE

## 2023-06-13 PROCEDURE — 83605 ASSAY OF LACTIC ACID: CPT | Performed by: EMERGENCY MEDICINE

## 2023-06-13 PROCEDURE — 84443 ASSAY THYROID STIM HORMONE: CPT | Performed by: EMERGENCY MEDICINE

## 2023-06-13 PROCEDURE — A9585 GADOBUTROL INJECTION: HCPCS | Performed by: EMERGENCY MEDICINE

## 2023-06-13 PROCEDURE — 36600 WITHDRAWAL OF ARTERIAL BLOOD: CPT

## 2023-06-13 PROCEDURE — 25500020 PHARM REV CODE 255: Performed by: EMERGENCY MEDICINE

## 2023-06-13 PROCEDURE — 96376 TX/PRO/DX INJ SAME DRUG ADON: CPT

## 2023-06-13 PROCEDURE — 96375 TX/PRO/DX INJ NEW DRUG ADDON: CPT

## 2023-06-13 PROCEDURE — 82803 BLOOD GASES ANY COMBINATION: CPT

## 2023-06-13 PROCEDURE — 85379 FIBRIN DEGRADATION QUANT: CPT | Performed by: EMERGENCY MEDICINE

## 2023-06-13 PROCEDURE — 96361 HYDRATE IV INFUSION ADD-ON: CPT

## 2023-06-13 PROCEDURE — 93010 EKG 12-LEAD: ICD-10-PCS | Mod: ,,, | Performed by: INTERNAL MEDICINE

## 2023-06-13 PROCEDURE — 93010 ELECTROCARDIOGRAM REPORT: CPT | Mod: ,,, | Performed by: INTERNAL MEDICINE

## 2023-06-13 PROCEDURE — 99291 CRITICAL CARE FIRST HOUR: CPT

## 2023-06-13 PROCEDURE — 80179 DRUG ASSAY SALICYLATE: CPT | Performed by: EMERGENCY MEDICINE

## 2023-06-13 PROCEDURE — 85007 BL SMEAR W/DIFF WBC COUNT: CPT | Performed by: EMERGENCY MEDICINE

## 2023-06-13 PROCEDURE — 96374 THER/PROPH/DIAG INJ IV PUSH: CPT

## 2023-06-13 RX ORDER — GADOBUTROL 604.72 MG/ML
10 INJECTION INTRAVENOUS
Status: COMPLETED | OUTPATIENT
Start: 2023-06-13 | End: 2023-06-13

## 2023-06-13 RX ORDER — LORAZEPAM 2 MG/ML
1 INJECTION INTRAMUSCULAR
Status: COMPLETED | OUTPATIENT
Start: 2023-06-13 | End: 2023-06-13

## 2023-06-13 RX ORDER — LABETALOL HYDROCHLORIDE 5 MG/ML
10 INJECTION, SOLUTION INTRAVENOUS
Status: COMPLETED | OUTPATIENT
Start: 2023-06-13 | End: 2023-06-13

## 2023-06-13 RX ORDER — LORAZEPAM 2 MG/ML
1 INJECTION INTRAMUSCULAR
Status: DISCONTINUED | OUTPATIENT
Start: 2023-06-13 | End: 2023-06-14

## 2023-06-13 RX ORDER — MORPHINE SULFATE 4 MG/ML
2 INJECTION, SOLUTION INTRAMUSCULAR; INTRAVENOUS
Status: COMPLETED | OUTPATIENT
Start: 2023-06-13 | End: 2023-06-13

## 2023-06-13 RX ORDER — POTASSIUM CHLORIDE 20 MEQ/1
40 TABLET, EXTENDED RELEASE ORAL
Status: COMPLETED | OUTPATIENT
Start: 2023-06-13 | End: 2023-06-13

## 2023-06-13 RX ORDER — LORAZEPAM 2 MG/ML
0.5 INJECTION INTRAMUSCULAR
Status: COMPLETED | OUTPATIENT
Start: 2023-06-13 | End: 2023-06-13

## 2023-06-13 RX ORDER — INDOMETHACIN 25 MG/1
50 CAPSULE ORAL
Status: COMPLETED | OUTPATIENT
Start: 2023-06-13 | End: 2023-06-13

## 2023-06-13 RX ADMIN — LORAZEPAM 0.5 MG: 2 INJECTION INTRAMUSCULAR; INTRAVENOUS at 02:06

## 2023-06-13 RX ADMIN — MORPHINE SULFATE 2 MG: 4 INJECTION INTRAVENOUS at 06:06

## 2023-06-13 RX ADMIN — LORAZEPAM 1 MG: 2 INJECTION INTRAMUSCULAR; INTRAVENOUS at 08:06

## 2023-06-13 RX ADMIN — SODIUM CHLORIDE 1000 ML: 9 INJECTION, SOLUTION INTRAVENOUS at 02:06

## 2023-06-13 RX ADMIN — SODIUM CHLORIDE 1000 ML: 9 INJECTION, SOLUTION INTRAVENOUS at 10:06

## 2023-06-13 RX ADMIN — IOHEXOL 100 ML: 350 INJECTION, SOLUTION INTRAVENOUS at 11:06

## 2023-06-13 RX ADMIN — POTASSIUM CHLORIDE 40 MEQ: 1500 TABLET, EXTENDED RELEASE ORAL at 04:06

## 2023-06-13 RX ADMIN — GADOBUTROL 6 ML: 604.72 INJECTION INTRAVENOUS at 08:06

## 2023-06-13 RX ADMIN — SODIUM BICARBONATE 50 MEQ: 84 INJECTION, SOLUTION INTRAVENOUS at 10:06

## 2023-06-13 RX ADMIN — LABETALOL HYDROCHLORIDE 10 MG: 5 INJECTION INTRAVENOUS at 04:06

## 2023-06-13 NOTE — ED PROVIDER NOTES
"SCRIBE #1 NOTE: I, Dhara Templeton, am scribing for, and in the presence of, Dariel Sherwood MD. I have scribed the HPI, ROS, and PEx.     SCRIBE #2 NOTE: I, Tico Melissa, am scribing for, and in the presence of,  Sameer Mayorga Jr., MD. I have scribed the remaining portions of the note not scribed by Scribe #1.    History     Chief Complaint   Patient presents with    Blurred Vision     Blurred vision, aphasia, anxiety, unsteady gait, color vision altered, pt states for "few days", tachycardic and tachypneic in triage, Dr. Sherwood evaluated patient in triage on arrival     HPI  6/13/2023, 1:51 PM  History obtained from the patient    HPI:  Shania Tapia is a 37 y.o. female with a PMH of HTN who presents to the [facility] emergency department for evaluation of blurred vision which onset 2 days PTA. Associated symptoms include difficulty seeing color, bilateral hand tremor, shin tremoring/shaking, aphasia/word-finding difficulty, unsteady gait, and anxiety. No prior treatment reported. No other complaints or concerns.     Arrival mode: Personal vehicle    PCP: Flavia Fink DO    Review of patient's allergies indicates:   Allergen Reactions    Latex Rash    Latex, natural rubber Rash      Past Medical History:   Diagnosis Date    Abnormal Pap smear     Abnormal Pap smear of vagina     Anemia     Anxiety     Hypertension      Past Surgical History:   Procedure Laterality Date    CERVICAL BIOPSY      Conization        Family History   Problem Relation Age of Onset    Diabetes Paternal Grandmother     Hypertension Father     Heart attack Father     Breast cancer Neg Hx     Colon cancer Neg Hx     Ovarian cancer Neg Hx      Social History     Tobacco Use    Smoking status: Never    Smokeless tobacco: Never   Substance and Sexual Activity    Alcohol use: No    Drug use: No    Sexual activity: Yes     Partners: Male     Birth control/protection: None      Review of Systems     Review of Systems "   Constitutional:  Negative for fever.   HENT: Negative.  Negative for sore throat.    Eyes: Negative.    Respiratory: Negative.  Negative for shortness of breath.    Cardiovascular: Negative.  Negative for chest pain.   Gastrointestinal: Negative.  Negative for nausea.   Endocrine: Negative.    Genitourinary: Negative.  Negative for dysuria.   Musculoskeletal: Negative.  Negative for back pain.   Skin: Negative.  Negative for rash.   Allergic/Immunologic: Negative.    Neurological:  Positive for tremors (bilateral hands and shins) and speech difficulty. Negative for weakness and headaches.   Hematological: Negative.  Does not bruise/bleed easily.   Psychiatric/Behavioral:  The patient is nervous/anxious.    All other systems reviewed and are negative.     Physical Exam     Initial Vitals [06/13/23 1349]   BP Pulse Resp Temp SpO2   (!) 144/93 (!) 144 (!) 26 98.4 °F (36.9 °C) 98 %      MAP       --          Physical Exam  Nursing notes and vital signs reviewed.  Constitutional: Patient is in no distress.   Head: Normocephalic. Atraumatic.   Eyes: Conjunctivae are not pale. No scleral icterus. Visual field intact.  ENT: Mucous membranes moist.   Neck: Supple.   Cardiovascular: Tachycardic. Regular rhythm.   Pulmonary: No respiratory distress. Tachypnea.  Abdominal: Non-distended.   Musculoskeletal: Moves all extremities. No obvious deformities. No edema.   Skin: Warm and dry.   Neurological:  Alert, awake, and appropriate. Mild to moderate aphasia/word-finding difficulty.   Psychiatric: Normal affect.       ED Course   Critical Care    Date/Time: 6/14/2023 1:04 AM  Performed by: Sameer Mayorga Jr., MD  Authorized by: Sameer Mayorga Jr., MD   Direct patient critical care time: 20 minutes  Additional history critical care time: 13 minutes  Ordering / reviewing critical care time: 11 minutes  Documentation critical care time: 13 minutes  Consulting other physicians critical care time: 10 minutes  Consult with family  critical care time: 8 minutes  Total critical care time (exclusive of procedural time) : 75 minutes  Critical care time was exclusive of separately billable procedures and treating other patients and teaching time.  Critical care was necessary to treat or prevent imminent or life-threatening deterioration of the following conditions: respiratory failure (Metabolic disturbance).  Critical care was time spent personally by me on the following activities: blood draw for specimens, development of treatment plan with patient or surrogate, discussions with consultants, interpretation of cardiac output measurements, evaluation of patient's response to treatment, examination of patient, obtaining history from patient or surrogate, ordering and performing treatments and interventions, ordering and review of laboratory studies, ordering and review of radiographic studies, pulse oximetry, re-evaluation of patient's condition and review of old charts.      Intubation    Date/Time: 6/14/2023 2:08 AM  Location procedure was performed: [facility] EMERGENCY DEPARTMENT  Performed by: Sameer Mayorga Jr., MD  Authorized by: Jose Woody MD   Consent Done: Emergent Situation  Indications: respiratory distress, respiratory failure and airway protection  Intubation method: direct  Paralytic: none  Laryngoscope size: Delvalle 4  Tube size: 7.5 mm  Tube type: cuffed  Number of attempts: 1  Ventilation between attempts: BVM  Cricoid pressure: yes  Cords visualized: yes  Post-procedure assessment: chest rise, ETCO2 monitor, CO2 detector and esophageal detector  Breath sounds: clear  Cuff inflated: yes  Tube secured with: ETT ascencio  Complications: No  Specimens: No  Implants: No      Vitals:    06/13/23 1349 06/13/23 1400 06/13/23 1430 06/13/23 1600   BP: (!) 144/93 (!) 152/93 136/84 130/88   Pulse: (!) 144 (!) 138 (!) 128 (!) 125   Resp: (!) 26 (!) 25 (!) 26 (!) 25   Temp: 98.4 °F (36.9 °C)      TempSrc: Oral      SpO2: 98% 100% 100% 100%  "  Weight: 69.1 kg (152 lb 5.4 oz)      Height:        06/13/23 1700 06/13/23 1800 06/13/23 1854 06/13/23 1855   BP: 123/61   (!) 151/85   Pulse: (!) 121 (!) 120  (!) 124   Resp: (!) 27 (!) 26 18 (!) 25   Temp:       TempSrc:       SpO2: 100% 100%  100%   Weight:       Height:        06/13/23 1900 06/13/23 2045 06/13/23 2100 06/13/23 2200   BP:  (!) 167/104 (!) 156/87    Pulse:  (!) 122 (!) 133    Resp: (!) 24 (!) 21 (!) 22    Temp:  98.6 °F (37 °C)     TempSrc:  Oral     SpO2:   99%    Weight:       Height:    5' 1" (1.549 m)    06/13/23 2207 06/14/23 0101   BP: (!) 161/91 (!) 158/87   Pulse: (!) 129 (!) 119   Resp: (!) 24 (!) 28   Temp:     TempSrc:     SpO2: 100% (!) 91%   Weight:     Height:       Lab Results Interpreted as Abnormal:  Labs Reviewed   CBC W/ AUTO DIFFERENTIAL - Abnormal; Notable for the following components:       Result Value    WBC 15.11 (*)     Hemoglobin 9.9 (*)     Hematocrit 34.5 (*)     MCV 75 (*)     MCH 21.5 (*)     MCHC 28.7 (*)     RDW 21.4 (*)     Platelets 122 (*)     Gran % 87.0 (*)     Lymph % 1.0 (*)     Platelet Estimate Decreased (*)     All other components within normal limits    Narrative:     Release to patient->Immediate   COMPREHENSIVE METABOLIC PANEL - Abnormal; Notable for the following components:    Sodium 134 (*)     Potassium 3.1 (*)     CO2 <5 (*)     Glucose 171 (*)     Total Protein 9.1 (*)     All other components within normal limits    Narrative:     Release to patient->Immediate    CO2 critical result(s) called and verbal readback obtained from   sanchez miles rn ed by LT2 06/13/2023 23:24   DRUG SCREEN PANEL, URINE EMERGENCY - Abnormal; Notable for the following components:    Opiate Scrn, Ur Presumptive Positive (*)     All other components within normal limits    Narrative:     Specimen Source->Urine   D DIMER, QUANTITATIVE - Abnormal; Notable for the following components:    D-Dimer 8.38 (*)     All other components within normal limits   SALICYLATE LEVEL - " Abnormal; Notable for the following components:    Salicylate Lvl <5.0 (*)     All other components within normal limits   ACETAMINOPHEN LEVEL - Abnormal; Notable for the following components:    Acetaminophen (Tylenol), Serum <3.0 (*)     All other components within normal limits   LACTIC ACID, PLASMA - Abnormal; Notable for the following components:    Lactate (Lactic Acid) 2.3 (*)     All other components within normal limits   COMPREHENSIVE METABOLIC PANEL - Abnormal; Notable for the following components:    CO2 <5 (*)     Glucose 164 (*)     Total Protein 8.8 (*)     All other components within normal limits    Narrative:      CO2 result(s) called and verbal readback obtained from Maryse Cat RN by [facility] 06/14/2023 01:18   LACTIC ACID, PLASMA - Abnormal; Notable for the following components:    Lactate (Lactic Acid) 3.9 (*)     All other components within normal limits    Narrative:      LA result(s) called and verbal readback obtained from Vladimir Pritchett RN by [facility] 06/14/2023 02:18   BETA - HYDROXYBUTYRATE, SERUM - Abnormal; Notable for the following components:    Beta-Hydroxybutyrate 1.3 (*)     All other components within normal limits   ISTAT PROCEDURE - Abnormal; Notable for the following components:    POC PH 7.065 (*)     POC PCO2 9.8 (*)     POC PO2 108 (*)     POC HCO3 2.8 (*)     All other components within normal limits   ISTAT PROCEDURE - Abnormal; Notable for the following components:    POC PH 7.064 (*)     POC PCO2 9.0 (*)     POC PO2 106 (*)     POC HCO3 2.6 (*)     All other components within normal limits   POCT GLUCOSE - Abnormal; Notable for the following components:    POCT Glucose 190 (*)     All other components within normal limits   CULTURE, BLOOD   CULTURE, BLOOD   CULTURE, METHICILLIN-RESISTANT STAPHYLOCOCCUS AUREUS   HIV 1 / 2 ANTIBODY    Narrative:     Release to patient->Immediate   HEPATITIS C ANTIBODY    Narrative:     Release to patient->Immediate   HEP C VIRUS HOLD SPECIMEN     Narrative:     Release to patient->Immediate   TSH    Narrative:     Release to patient->Immediate   TROPONIN I   TROPONIN I    Narrative:     Release to patient->Immediate   ALCOHOL,MEDICAL (ETHANOL)   URINALYSIS, REFLEX TO URINE CULTURE   HEMOGLOBIN A1C   IRON AND TIBC   BETA - HYDROXYBUTYRATE, SERUM   URINALYSIS, REFLEX TO URINE CULTURE   IRON AND TIBC   HEMOGLOBIN A1C   POCT GLUCOSE MONITORING CONTINUOUS      All Lab Results:  Results for orders placed or performed during the hospital encounter of 06/13/23   HIV 1/2 Ag/Ab (4th Gen)   Result Value Ref Range    HIV 1/2 Ag/Ab Negative Negative   Hepatitis C Antibody   Result Value Ref Range    Hepatitis C Ab Negative Negative   HCV Virus Hold Specimen   Result Value Ref Range    HEP C Virus Hold Specimen Hold for HCV sendout    CBC auto differential   Result Value Ref Range    WBC 15.11 (H) 3.90 - 12.70 K/uL    RBC 4.61 4.00 - 5.40 M/uL    Hemoglobin 9.9 (L) 12.0 - 16.0 g/dL    Hematocrit 34.5 (L) 37.0 - 48.5 %    MCV 75 (L) 82 - 98 fL    MCH 21.5 (L) 27.0 - 31.0 pg    MCHC 28.7 (L) 32.0 - 36.0 g/dL    RDW 21.4 (H) 11.5 - 14.5 %    Platelets 122 (L) 150 - 450 K/uL    MPV SEE COMMENT 9.2 - 12.9 fL    Immature Granulocytes Test Not Performed 0.0 - 0.5 %    Immature Grans (Abs) Test Not Performed 0.00 - 0.04 K/uL    nRBC 0 0 /100 WBC    Gran % 87.0 (H) 38.0 - 73.0 %    Lymph % 1.0 (L) 18.0 - 48.0 %    Mono % 6.0 4.0 - 15.0 %    Eosinophil % 0.0 0.0 - 8.0 %    Basophil % 0.0 0.0 - 1.9 %    Bands 6.0 %    Platelet Estimate Decreased (A)     Aniso Slight     Hypo Occasional     Ovalocytes Occasional     Differential Method Manual    Comprehensive metabolic panel   Result Value Ref Range    Sodium 134 (L) 136 - 145 mmol/L    Potassium 3.1 (L) 3.5 - 5.1 mmol/L    Chloride 100 95 - 110 mmol/L    CO2 <5 (LL) 23 - 29 mmol/L    Glucose 171 (H) 70 - 110 mg/dL    BUN 8 6 - 20 mg/dL    Creatinine 1.1 0.5 - 1.4 mg/dL    Calcium 8.9 8.7 - 10.5 mg/dL    Total Protein 9.1 (H) 6.0  - 8.4 g/dL    Albumin 4.3 3.5 - 5.2 g/dL    Total Bilirubin 0.6 0.1 - 1.0 mg/dL    Alkaline Phosphatase 87 55 - 135 U/L    AST 32 10 - 40 U/L    ALT 24 10 - 44 U/L    Anion Gap Unable to calculate 8 - 16 mmol/L    eGFR >60 >60 mL/min/1.73 m^2   TSH   Result Value Ref Range    TSH 1.259 0.400 - 4.000 uIU/mL   Troponin I   Result Value Ref Range    Troponin I <0.006 0.000 - 0.026 ng/mL   Drug screen panel, emergency   Result Value Ref Range    Benzodiazepines Negative Negative    Methadone metabolites Negative Negative    Cocaine (Metab.) Negative Negative    Opiate Scrn, Ur Presumptive Positive (A) Negative    Barbiturate Screen, Ur Negative Negative    Amphetamine Screen, Ur Negative Negative    THC Negative Negative    Phencyclidine Negative Negative    Creatinine, Urine 40.1 15.0 - 325.0 mg/dL    Toxicology Information SEE COMMENT    D dimer, quantitative   Result Value Ref Range    D-Dimer 8.38 (H) <0.50 mg/L FEU   Salicylate level   Result Value Ref Range    Salicylate Lvl <5.0 (L) 15.0 - 30.0 mg/dL   Acetaminophen level   Result Value Ref Range    Acetaminophen (Tylenol), Serum <3.0 (L) 10.0 - 20.0 ug/mL   Lactic acid, plasma   Result Value Ref Range    Lactate (Lactic Acid) 2.3 (H) 0.5 - 2.2 mmol/L   Ethanol   Result Value Ref Range    Alcohol, Serum <10 <10 mg/dL   Comprehensive metabolic panel   Result Value Ref Range    Sodium 140 136 - 145 mmol/L    Potassium 3.8 3.5 - 5.1 mmol/L    Chloride 108 95 - 110 mmol/L    CO2 <5 (LL) 23 - 29 mmol/L    Glucose 164 (H) 70 - 110 mg/dL    BUN 7 6 - 20 mg/dL    Creatinine 0.9 0.5 - 1.4 mg/dL    Calcium 9.1 8.7 - 10.5 mg/dL    Total Protein 8.8 (H) 6.0 - 8.4 g/dL    Albumin 4.0 3.5 - 5.2 g/dL    Total Bilirubin 0.6 0.1 - 1.0 mg/dL    Alkaline Phosphatase 106 55 - 135 U/L    AST 31 10 - 40 U/L    ALT 23 10 - 44 U/L    Anion Gap Unable to calculate 8 - 16 mmol/L    eGFR >60 >60 mL/min/1.73 m^2   Lactic Acid, Plasma   Result Value Ref Range    Lactate (Lactic Acid) 3.9  (HH) 0.5 - 2.2 mmol/L   Beta - Hydroxybutyrate, Serum   Result Value Ref Range    Beta-Hydroxybutyrate 1.3 (H) 0.0 - 0.5 mmol/L   ISTAT PROCEDURE   Result Value Ref Range    POC PH 7.065 (LL) 7.35 - 7.45    POC PCO2 9.8 (LL) 35 - 45 mmHg    POC PO2 108 (H) 80 - 100 mmHg    POC HCO3 2.8 (L) 24 - 28 mmol/L    POC BE -27 -2 to 2 mmol/L    POC SATURATED O2 96 95 - 100 %    Sample ARTERIAL     Site RR     Allens Test Pass     DelSys Room Air     Mode SPONT     FiO2 21    ISTAT PROCEDURE   Result Value Ref Range    POC PH 7.064 (LL) 7.35 - 7.45    POC PCO2 9.0 (LL) 35 - 45 mmHg    POC PO2 106 (H) 80 - 100 mmHg    POC HCO3 2.6 (L) 24 - 28 mmol/L    POC BE -28 -2 to 2 mmol/L    POC SATURATED O2 95 95 - 100 %    Sample ARTERIAL     Site RR     Allens Test Pass     DelSys Room Air    POCT glucose   Result Value Ref Range    POCT Glucose 190 (H) 70 - 110 mg/dL         Imaging Results              X-Ray Chest AP Portable (In process)                       CTA Chest Non-Coronary (PE Studies) (Final result)  Result time 06/13/23 23:50:43      Final result by Bao Juan MD (06/13/23 23:50:43)                   Impression:      Large right upper lobe opacity favoring pneumonia.  Follow-up to complete resolution recommended.    No definite pulmonary embolus noting suboptimal opacification and motion.    This report was flagged in Epic as abnormal.    All CT scans at this facility are performed  using dose modulation techniques as appropriate to performed exam including the following:  automated exposure control; adjustment of mA and/or kV according to the patients size (this includes techniques or standardized protocols for targeted exams where dose is matched to indication/reason for exam: i.e. extremities or head);  iterative reconstruction technique.      Electronically signed by: Bao Juan  Date:    06/13/2023  Time:    23:50               Narrative:    EXAMINATION:  CTA CHEST NON CORONARY (PE STUDIES)    CLINICAL  HISTORY:  Pulmonary embolism (PE) suspected, high prob;    TECHNIQUE:  Low dose axial images, sagittal and coronal reformations were obtained from the thoracic inlet to the lung bases following the IV administration of 100 mL of Omnipaque 350.  Contrast timing was optimized to evaluate the pulmonary arteries.  MIP images were performed.    COMPARISON:  None    FINDINGS:  Base of Neck: No significant abnormality.    Thoracic soft tissues: Unremarkable.    Aorta: Left-sided aortic arch.  No aneurysm and no significant atherosclerosis    Heart: Normal size. No effusion.    Pulmonary vasculature: Suboptimal opacification related to contrast timing.  Evaluation adequate to the level of the segmental pulmonary arteries.  Motion degrades the exam.    Nichelle/Mediastinum: No pathologic alla enlargement.    Airways: Patent.    Lungs/Pleura: Large right upper lobe opacity favoring pneumonia.  Trace opacities in the middle and lower lobes.  Left lung appears clear allowing for motion artifact.    Esophagus: Unremarkable.    Upper Abdomen: No abnormality of the partially imaged upper abdomen.    Bones: No acute fracture. No suspicious lytic or sclerotic lesions.                                       MRI Brain Demyelinating W W/O Contrast (Final result)  Result time 06/13/23 20:55:45      Final result by Drea Whitfield MD (06/13/23 20:55:45)                   Impression:      No overt acute finding as visualized motion degradation      Electronically signed by: Drea Whitfield  Date:    06/13/2023  Time:    20:55               Narrative:    EXAMINATION:  MRI BRAIN DEMYELINATING W/ WO CONTRAST    CLINICAL HISTORY:  altered mental status, aphasia, reports can't see color;    TECHNIQUE:  Multiplanar multisequence MR imaging of the brain was performed before and after the administration of 6 mL Gadavist intravenous contrast.    COMPARISON:  CT correlation    FINDINGS:  Imaging degraded by motion.  No acute ischemia  identified.    No mass effect or midline shift.    Ventricles nondistended.    No overt abnormal parenchymal signal as visualized with motion degradation.    No atypical enhancement                                       CT Head Without Contrast (Final result)  Result time 06/13/23 15:09:25      Final result by STONE Castro Sr., MD (06/13/23 15:09:25)                   Impression:      Normal study.    All CT scans at this facility use dose modulation, iterative reconstruction, and/or weight base dosing when appropriate to reduce radiation dose when appropriate to reduce radiation dose to as low as reasonably achievable.      Electronically signed by: Carlos Enrique Castro MD  Date:    06/13/2023  Time:    15:09               Narrative:    EXAMINATION:  CT HEAD WITHOUT CONTRAST    CLINICAL HISTORY:  Mental status change, unknown cause;    TECHNIQUE:  Standard brain CT protocol without IV contrast was performed.    COMPARISON:  None    FINDINGS:  The ventricles have a normal size, position, and appearance. There is no abnormal intracranial mass or intracranial hemorrhage. There is no skull fracture. The paranasal sinuses are normal in appearance.                                     ED Physician's independent review of the above imaging: agree with radiologist    The EKG was ordered, reviewed, and independently interpreted by the ED Physician:  Interpretation time: 14:00  Rate: 134 bpm  Rhythm: sinus tachycardia  Interpretation: Possible left atrial enlargement. Incomplete right bundle branch block. Nonspecific ST and T wave abnormality. Abnormal ECG. No STEMI.      The emergency physician reviewed the vital signs and test results, which are outlined above.     ED Discussion     3:59 PM: Dr. Sherwood transfers care of patient to Dr. Mayorga pending  lab results.     9:22 PM: Discussed pt's case with Dr. Cleveland ([facility]) who recommends a d-dimer and tox screen. Also recommends tylenol and salicylate levels.  States pt is too unstable for floor and doesn't have complete work up yet. Wants to rule out clots and toxicology first.    10:27 PM: Added a lactate and alcohol as well. This is metabolic acidosis and pt is likely tachypneic trying to compensate. ABG isn't good    11:14 PM: Discussed pt's case with Madhuri Vásquez NP ([facility]) who requests checking STAT CTA.    11:56 PM: Large right-sided Pneumonia present.    11:59 PM: Discussed pt's case with Madhuri Vásquez NP ([facility]) who recommends repeat CMP, plan to admit to ICU    12:00 AM: Discussed pt's case with Dr. Cleveland ([facility]) who recommends repeat cbc, cmp, la, and abg to reassess icu vs floor.     12:10 AM: Discussed pt's case with ANABELLA Shay (Intensive Care) who will review while awaiting repeat labs.     1:18 AM: Discussed pt's case with ANABELLA Shay (Intensive Care) who states abg unchanged, will place admit to ICU orders. She would also like a braxton placed and send UA.     1:24 AM: Discussed case with ANABELLA Shay (Critical Care Medicine). ANABELLA Shay agrees with current care and management of pt and accepts admission.   Admitting Service: ICU  Admitting Physician: ANABELLA Shay  Admit to: ICU     1:24 AM: Re-evaluated pt. I have discussed test results, shared treatment plan, and the need for admission with patient and family at bedside. Pt and family express understanding at this time and agree with all information. All questions answered. Pt and family have no further questions or concerns at this time. Pt is ready for admit.      2:09 AM  Patient had a change of status.  Patient going to have some twitching.  She is received a 0.5 mg of Ativan with resolution of twitching however breathing became very shallow and she became hypoxic.  She subsequently intubated with a 7.5 ET tube with a 4 Delvalle blade without difficulty.  Patient family are aware.  ICU team is at bedside.    Medical  Decision Making:   Initial Assessment:   I received the patient in turnover from the prior physician.  He had evaluated the patient on arrival noted she was tachypneic with visual disturbances and confusion.  Differential Diagnosis:   Sepsis, respiratory failure, pneumonia, hypercarbia, demyelinating injury, anxiety  Clinical Tests:   Lab Tests: Ordered and Reviewed  Radiological Study: Ordered and Reviewed  Medical Tests: Ordered and Reviewed  ED Management:  Patient was initiated workup by the prior physician.  It completed most of the workup and sent the patient for MRI due to concerns for demyelinating disease upon my arrival.  He would ordered some initial blood work showed a mild elevation in her white count in his CO2 of less than 5.  There is no evidence of DKA however on this.  She was tachypneic and tachycardic on arrival.  He believes she was anxious possible having demyelinating injury she was referred for MRI of the brain.  Subsequently returned as negative.  Upon my review of her chart review the patient has noted the patient was very tachycardic and tachypneic.  Her CO2 was less than 5 indicating metabolic acidosis and I subsequently initiated further workup.  I ordered an ABG which showed a significant acidosis with metabolic in nature CTA of her chest.  She would a large right upper lobe pneumonia which led to further workup.  Patient had been initially discussed with hospital medicine help guide some therapy.  Patient received IV fluids as well as antibiotics as soon as a pneumonia was identified.  Patient's initial presenting complaint was not respiratory but Neurological.  Metabolic acidosis led to the following workup.  Ethanol was negative lactate was minimally elevated.  EKG was independently interpret as normal sinus rhythm/sinus tachycardia.  Patient was dosed several doses of bicarb.  Alcohol is normal, she had positive opiates on her drug screen however she did receive opiates here as well  as Ativan.  She would an elevated D-dimer I prompted the CT scan.  Her salicylate and acetaminophen were negative.  Troponin was normal.  Patient was subsequently accepted by the ICU team for admission.  Of note, the delay in antibiotics was due to the conflicting initial presenting complaint and subsequent diagnosis that was made based on her blood work.  As soon as pneumonia was identified antibiotics and fluids were expeditiously given.  This patient presented with neurological complaints of dizziness and visual disturbances.  Patient was tachypneic on arrival was evaluated by the preceding physician.  He did blood work as well as a CT scan which was negative.  Blood work did show CO2 of less than 5 which she presumed was due to hyperventilation.  MRI was ordered to rule out demyelinating disease.  This was negative.  I assumed care of the patient while heading to MRI on turnover.  Upon return of the patient in the MRI read I re-evaluated the patient noted that she was significantly tachypneic.  Reviewed her blood work noted the acidosis subsequently consulted with Hospital Medicine.  They have evaluated the chart recommended further workup which was undertaken.  Blood work was obtained with a repeat of the CMP along with a D-dimer.  Patient received fluids with a positive D-dimer CT scan was obtained which subsequently showed a pneumonia.  Was no immediate indication for chest x-ray the patient was a bit tachypneic but not hypoxic.  Patient appeared to be happy to compensate for metabolic derangement.  Light of the pneumonia patient received blood cultures as well as IV antibiotics.  Consultation was obtained with the critical care team requested further laboratory studies prior to admission these were obtained.  After admission the patient subsequently had an episode of tremors and appeared to be drooling.  This consistent with possible seizure and she was dosed with 0.5 mg of Ativan.  Seizures have resolved  however the patient became apneic and was subsequently intubated without difficulty.  Critical care team was at the bedside throughout.       ED Medication(s):  Medications   sodium bicarbonate 150 mEq in sterile water 1,150 mL infusion ( Intravenous New Bag 6/14/23 0139)   hydrALAZINE injection 20 mg (has no administration in time range)   mupirocin 2 % ointment (has no administration in time range)   famotidine tablet 20 mg (has no administration in time range)   enoxaparin injection 40 mg (has no administration in time range)   ceFEPIme (MAXIPIME) 2 g in dextrose 5 % in water (D5W) 5 % 100 mL IVPB (MB+) (has no administration in time range)   azithromycin (ZITHROMAX) 500 mg in dextrose 5 % (D5W) 250 mL IVPB (Vial-Mate) (has no administration in time range)   glucagon (human recombinant) injection 1 mg (has no administration in time range)   dextrose 10% bolus 125 mL 125 mL (has no administration in time range)   dextrose 10% bolus 250 mL 250 mL (has no administration in time range)   insulin aspart U-100 pen 1-10 Units (has no administration in time range)   etomidate (AMIDATE) 2 mg/mL injection (has no administration in time range)   succinylcholine (ANECTINE) 20 mg/mL injection (has no administration in time range)   fentaNYL 2500 mcg in 0.9% sodium chloride 250 mL infusion premix (titrating) (has no administration in time range)   LORazepam injection 0.5 mg (0.5 mg Intravenous Given 6/13/23 1414)   sodium chloride 0.9% bolus 1,000 mL 1,000 mL (0 mLs Intravenous Stopped 6/13/23 1517)   potassium chloride SA CR tablet 40 mEq (40 mEq Oral Given 6/13/23 1603)   labetaloL injection 10 mg (10 mg Intravenous Given 6/13/23 1604)   morphine injection 2 mg (2 mg Intravenous Given 6/13/23 1854)   LORazepam injection 1 mg (1 mg Intravenous Given 6/13/23 2000)   gadobutroL (GADAVIST) injection 10 mL (6 mLs Intravenous Given 6/13/23 2035)   sodium chloride 0.9% bolus 1,000 mL 1,000 mL (0 mLs Intravenous Stopped 6/14/23  0115)   sodium bicarbonate solution 50 mEq (50 mEq Intravenous Given 6/13/23 2240)   iohexoL (OMNIPAQUE 350) injection 100 mL (100 mLs Intravenous Given 6/13/23 2340)   ceFEPIme (MAXIPIME) 2 g in dextrose 5 % in water (D5W) 5 % 100 mL IVPB (MB+) (2 g Intravenous New Bag 6/14/23 0136)   sodium chloride 0.9% bolus 1,000 mL 1,000 mL (1,000 mLs Intravenous New Bag 6/14/23 0117)   sodium bicarbonate solution 50 mEq (50 mEq Intravenous Given 6/14/23 0137)   LORazepam injection 0.5 mg (0.5 mg Intravenous Given 6/14/23 0157)     New Prescriptions    No medications on file     Prescription Management: I performed a review of the patient's current Rx medication list as input by nursing staff.        Scribe Attestation:   Scribe #1: I performed the above scribed service and the documentation accurately describes the services I performed. I attest to the accuracy of the note.     Attending:   Physician Attestation Statement for Scribe #1: I, Dariel Sherwood MD, personally performed the services described in this documentation, as scribed by Dhara Templeton, in my presence, and it is both accurate and complete. As with other dictation methods such as dictation software, small errors or inconsistencies may be overlooked due to the goal of spending more face-to-face time with patients.    Scribe Attestation:   Scribe #2: I performed the above scribed service and the documentation accurately describes the services I performed. I attest to the accuracy of the note.    Attending Attestation:           Physician Attestation for Scribe:    Physician Attestation Statement for Scribe #2: I, Sameer Mayorga Jr., MD, reviewed documentation, as scribed by Tico Melissa in my presence, and it is both accurate and complete. I also acknowledge and confirm the content of the note done by Scribe #1.       Clinical Impression       ICD-10-CM ICD-9-CM   1. Metabolic acidosis  E87.20 276.2   2. Tachycardia  R00.0 785.0   3. Hyperventilation   R06.4 786.01   4. Pneumonia of right upper lobe due to infectious organism  J18.9 486   5. Acute respiratory failure, unspecified whether with hypoxia or hypercapnia  J96.00 518.81   6. Severe sepsis  A41.9 038.9    R65.20 995.92      ED Disposition Condition    Admit Stable             Sameer Mayorga Jr., MD  06/14/23 0139       Sameer Mayorga Jr., MD  06/14/23 0210       Sameer Mayorga Jr., MD  06/14/23 0226

## 2023-06-14 PROBLEM — E87.20 METABOLIC ACIDOSIS: Status: ACTIVE | Noted: 2023-06-14

## 2023-06-14 PROBLEM — R65.20 SEVERE SEPSIS: Status: ACTIVE | Noted: 2023-06-14

## 2023-06-14 PROBLEM — Z99.11 ON MECHANICALLY ASSISTED VENTILATION: Status: ACTIVE | Noted: 2023-06-14

## 2023-06-14 PROBLEM — G93.40 ENCEPHALOPATHY: Status: ACTIVE | Noted: 2023-06-14

## 2023-06-14 PROBLEM — E87.29 HIGH ANION GAP METABOLIC ACIDOSIS: Status: ACTIVE | Noted: 2023-06-14

## 2023-06-14 PROBLEM — A41.9 SEVERE SEPSIS: Status: ACTIVE | Noted: 2023-06-14

## 2023-06-14 PROBLEM — J18.9 PNEUMONIA OF RIGHT UPPER LOBE DUE TO INFECTIOUS ORGANISM: Status: ACTIVE | Noted: 2023-06-14

## 2023-06-14 LAB
ALBUMIN SERPL BCP-MCNC: 4 G/DL (ref 3.5–5.2)
ALLENS TEST: ABNORMAL
ALP SERPL-CCNC: 106 U/L (ref 55–135)
ALT SERPL W/O P-5'-P-CCNC: 23 U/L (ref 10–44)
ANION GAP SERPL CALC-SCNC: 15 MMOL/L (ref 8–16)
ANION GAP SERPL CALC-SCNC: 24 MMOL/L (ref 8–16)
ANION GAP SERPL CALC-SCNC: 26 MMOL/L (ref 8–16)
ANION GAP SERPL CALC-SCNC: 28 MMOL/L (ref 8–16)
ANION GAP SERPL CALC-SCNC: ABNORMAL MMOL/L (ref 8–16)
ANION GAP SERPL CALC-SCNC: ABNORMAL MMOL/L (ref 8–16)
AST SERPL-CCNC: 31 U/L (ref 10–40)
B-HCG UR QL: NEGATIVE
B-OH-BUTYR BLD STRIP-SCNC: 1.3 MMOL/L (ref 0–0.5)
BASOPHILS NFR BLD: 0 % (ref 0–1.9)
BILIRUB SERPL-MCNC: 0.6 MG/DL (ref 0.1–1)
BUN SERPL-MCNC: 10 MG/DL (ref 6–20)
BUN SERPL-MCNC: 3 MG/DL (ref 6–20)
BUN SERPL-MCNC: 7 MG/DL (ref 6–20)
BUN SERPL-MCNC: 8 MG/DL (ref 6–20)
BUN SERPL-MCNC: 9 MG/DL (ref 6–20)
BUN SERPL-MCNC: 9 MG/DL (ref 6–20)
CALCIUM SERPL-MCNC: 8 MG/DL (ref 8.7–10.5)
CALCIUM SERPL-MCNC: 8.3 MG/DL (ref 8.7–10.5)
CALCIUM SERPL-MCNC: 8.4 MG/DL (ref 8.7–10.5)
CALCIUM SERPL-MCNC: 8.4 MG/DL (ref 8.7–10.5)
CALCIUM SERPL-MCNC: 8.5 MG/DL (ref 8.7–10.5)
CALCIUM SERPL-MCNC: 9.1 MG/DL (ref 8.7–10.5)
CHLORIDE SERPL-SCNC: 106 MMOL/L (ref 95–110)
CHLORIDE SERPL-SCNC: 108 MMOL/L (ref 95–110)
CHLORIDE SERPL-SCNC: 109 MMOL/L (ref 95–110)
CHLORIDE SERPL-SCNC: 97 MMOL/L (ref 95–110)
CO2 SERPL-SCNC: 12 MMOL/L (ref 23–29)
CO2 SERPL-SCNC: 29 MMOL/L (ref 23–29)
CO2 SERPL-SCNC: 5 MMOL/L (ref 23–29)
CO2 SERPL-SCNC: 9 MMOL/L (ref 23–29)
CO2 SERPL-SCNC: <5 MMOL/L (ref 23–29)
CO2 SERPL-SCNC: <5 MMOL/L (ref 23–29)
CREAT SERPL-MCNC: 0.4 MG/DL (ref 0.5–1.4)
CREAT SERPL-MCNC: 0.8 MG/DL (ref 0.5–1.4)
CREAT SERPL-MCNC: 0.8 MG/DL (ref 0.5–1.4)
CREAT SERPL-MCNC: 0.9 MG/DL (ref 0.5–1.4)
CREAT SERPL-MCNC: 0.9 MG/DL (ref 0.5–1.4)
CREAT SERPL-MCNC: 1.2 MG/DL (ref 0.5–1.4)
DELSYS: ABNORMAL
DIFFERENTIAL METHOD: ABNORMAL
EOSINOPHIL NFR BLD: 0 % (ref 0–8)
ERYTHROCYTE [DISTWIDTH] IN BLOOD BY AUTOMATED COUNT: 21.6 % (ref 11.5–14.5)
ERYTHROCYTE [SEDIMENTATION RATE] IN BLOOD BY WESTERGREN METHOD: 26 MM/H
ERYTHROCYTE [SEDIMENTATION RATE] IN BLOOD BY WESTERGREN METHOD: 32 MM/H
ERYTHROCYTE [SEDIMENTATION RATE] IN BLOOD BY WESTERGREN METHOD: 32 MM/H
EST. GFR  (NO RACE VARIABLE): 59 ML/MIN/1.73 M^2
EST. GFR  (NO RACE VARIABLE): >60 ML/MIN/1.73 M^2
ESTIMATED AVG GLUCOSE: 111 MG/DL (ref 68–131)
FIO2: 60
GLUCOSE SERPL-MCNC: 115 MG/DL (ref 70–110)
GLUCOSE SERPL-MCNC: 116 MG/DL (ref 70–110)
GLUCOSE SERPL-MCNC: 151 MG/DL (ref 70–110)
GLUCOSE SERPL-MCNC: 164 MG/DL (ref 70–110)
GLUCOSE SERPL-MCNC: 225 MG/DL (ref 70–110)
GLUCOSE SERPL-MCNC: 82 MG/DL (ref 70–110)
HBA1C MFR BLD: 5.5 % (ref 4–5.6)
HCO3 UR-SCNC: 10.6 MMOL/L (ref 24–28)
HCO3 UR-SCNC: 2.6 MMOL/L (ref 24–28)
HCO3 UR-SCNC: 5.9 MMOL/L (ref 24–28)
HCO3 UR-SCNC: 9.8 MMOL/L (ref 24–28)
HCT VFR BLD AUTO: 35.6 % (ref 37–48.5)
HGB BLD-MCNC: 9.8 G/DL (ref 12–16)
IMM GRANULOCYTES # BLD AUTO: ABNORMAL K/UL (ref 0–0.04)
IMM GRANULOCYTES NFR BLD AUTO: ABNORMAL % (ref 0–0.5)
IRON SERPL-MCNC: 30 UG/DL (ref 30–160)
LACTATE SERPL-SCNC: 1.3 MMOL/L (ref 0.5–2.2)
LACTATE SERPL-SCNC: 1.7 MMOL/L (ref 0.5–2.2)
LACTATE SERPL-SCNC: 1.9 MMOL/L (ref 0.5–2.2)
LACTATE SERPL-SCNC: 3.9 MMOL/L (ref 0.5–2.2)
LYMPHOCYTES NFR BLD: 6 % (ref 18–48)
MAGNESIUM SERPL-MCNC: 1.4 MG/DL (ref 1.6–2.6)
MCH RBC QN AUTO: 21.6 PG (ref 27–31)
MCHC RBC AUTO-ENTMCNC: 27.5 G/DL (ref 32–36)
MCV RBC AUTO: 79 FL (ref 82–98)
METAMYELOCYTES NFR BLD MANUAL: 1 %
MIN VOL: 12
MODE: ABNORMAL
MONOCYTES NFR BLD: 4 % (ref 4–15)
NEUTROPHILS NFR BLD: 82 % (ref 38–73)
NEUTS BAND NFR BLD MANUAL: 7 %
NRBC BLD-RTO: 0 /100 WBC
OSMOLALITY SERPL: 351 MOSM/KG (ref 275–295)
OVALOCYTES BLD QL SMEAR: ABNORMAL
PCO2 BLDA: 22.6 MMHG (ref 35–45)
PCO2 BLDA: 22.8 MMHG (ref 35–45)
PCO2 BLDA: 23.6 MMHG (ref 35–45)
PCO2 BLDA: 9 MMHG (ref 35–45)
PEEP: 5
PEEP: 8
PEEP: 8
PH SMN: 7.02 [PH] (ref 7.35–7.45)
PH SMN: 7.06 [PH] (ref 7.35–7.45)
PH SMN: 7.23 [PH] (ref 7.35–7.45)
PH SMN: 7.28 [PH] (ref 7.35–7.45)
PIP: 28
PLATELET # BLD AUTO: 114 K/UL (ref 150–450)
PMV BLD AUTO: ABNORMAL FL (ref 9.2–12.9)
PO2 BLDA: 106 MMHG (ref 80–100)
PO2 BLDA: 136 MMHG (ref 80–100)
PO2 BLDA: 84 MMHG (ref 80–100)
PO2 BLDA: 88 MMHG (ref 80–100)
POC BE: -16 MMOL/L
POC BE: -18 MMOL/L
POC BE: -25 MMOL/L
POC BE: -28 MMOL/L
POC SATURATED O2: 95 % (ref 95–100)
POC SATURATED O2: 97 % (ref 95–100)
POCT GLUCOSE: 119 MG/DL (ref 70–110)
POCT GLUCOSE: 122 MG/DL (ref 70–110)
POCT GLUCOSE: 132 MG/DL (ref 70–110)
POCT GLUCOSE: 190 MG/DL (ref 70–110)
POTASSIUM SERPL-SCNC: 2.4 MMOL/L (ref 3.5–5.1)
POTASSIUM SERPL-SCNC: 3 MMOL/L (ref 3.5–5.1)
POTASSIUM SERPL-SCNC: 3 MMOL/L (ref 3.5–5.1)
POTASSIUM SERPL-SCNC: 3.8 MMOL/L (ref 3.5–5.1)
POTASSIUM SERPL-SCNC: 4.6 MMOL/L (ref 3.5–5.1)
POTASSIUM SERPL-SCNC: 4.7 MMOL/L (ref 3.5–5.1)
PROT SERPL-MCNC: 8.8 G/DL (ref 6–8.4)
RBC # BLD AUTO: 4.53 M/UL (ref 4–5.4)
SAMPLE: ABNORMAL
SATURATED IRON: 8 % (ref 20–50)
SITE: ABNORMAL
SODIUM SERPL-SCNC: 138 MMOL/L (ref 136–145)
SODIUM SERPL-SCNC: 140 MMOL/L (ref 136–145)
SODIUM SERPL-SCNC: 141 MMOL/L (ref 136–145)
SODIUM SERPL-SCNC: 141 MMOL/L (ref 136–145)
SODIUM SERPL-SCNC: 142 MMOL/L (ref 136–145)
SODIUM SERPL-SCNC: 144 MMOL/L (ref 136–145)
SP02: 99
TOTAL IRON BINDING CAPACITY: 357 UG/DL (ref 250–450)
TRANSFERRIN SERPL-MCNC: 241 MG/DL (ref 200–375)
VT: 380
VT: 380
VT: 420
WBC # BLD AUTO: 25.53 K/UL (ref 3.9–12.7)

## 2023-06-14 PROCEDURE — 63600175 PHARM REV CODE 636 W HCPCS: Performed by: NURSE PRACTITIONER

## 2023-06-14 PROCEDURE — 84466 ASSAY OF TRANSFERRIN: CPT | Performed by: EMERGENCY MEDICINE

## 2023-06-14 PROCEDURE — 25000003 PHARM REV CODE 250: Performed by: NURSE PRACTITIONER

## 2023-06-14 PROCEDURE — A4217 STERILE WATER/SALINE, 500 ML: HCPCS | Performed by: NURSE PRACTITIONER

## 2023-06-14 PROCEDURE — 25500020 PHARM REV CODE 255: Performed by: EMERGENCY MEDICINE

## 2023-06-14 PROCEDURE — 99900035 HC TECH TIME PER 15 MIN (STAT)

## 2023-06-14 PROCEDURE — 36415 COLL VENOUS BLD VENIPUNCTURE: CPT | Performed by: INTERNAL MEDICINE

## 2023-06-14 PROCEDURE — 87205 SMEAR GRAM STAIN: CPT | Performed by: NURSE PRACTITIONER

## 2023-06-14 PROCEDURE — 83735 ASSAY OF MAGNESIUM: CPT | Performed by: INTERNAL MEDICINE

## 2023-06-14 PROCEDURE — 63600175 PHARM REV CODE 636 W HCPCS: Performed by: EMERGENCY MEDICINE

## 2023-06-14 PROCEDURE — 82010 KETONE BODYS QUAN: CPT | Performed by: EMERGENCY MEDICINE

## 2023-06-14 PROCEDURE — 96361 HYDRATE IV INFUSION ADD-ON: CPT

## 2023-06-14 PROCEDURE — 83605 ASSAY OF LACTIC ACID: CPT | Mod: 91 | Performed by: INTERNAL MEDICINE

## 2023-06-14 PROCEDURE — 99291 CRITICAL CARE FIRST HOUR: CPT | Mod: 25,,, | Performed by: INTERNAL MEDICINE

## 2023-06-14 PROCEDURE — 83930 ASSAY OF BLOOD OSMOLALITY: CPT | Performed by: INTERNAL MEDICINE

## 2023-06-14 PROCEDURE — 63600175 PHARM REV CODE 636 W HCPCS

## 2023-06-14 PROCEDURE — 83605 ASSAY OF LACTIC ACID: CPT | Performed by: NURSE PRACTITIONER

## 2023-06-14 PROCEDURE — 80320 DRUG SCREEN QUANTALCOHOLS: CPT | Performed by: NURSE PRACTITIONER

## 2023-06-14 PROCEDURE — 27200966 HC CLOSED SUCTION SYSTEM

## 2023-06-14 PROCEDURE — 25000003 PHARM REV CODE 250: Performed by: INTERNAL MEDICINE

## 2023-06-14 PROCEDURE — 80048 BASIC METABOLIC PNL TOTAL CA: CPT | Mod: XB | Performed by: NURSE PRACTITIONER

## 2023-06-14 PROCEDURE — 20000000 HC ICU ROOM

## 2023-06-14 PROCEDURE — 36600 WITHDRAWAL OF ARTERIAL BLOOD: CPT

## 2023-06-14 PROCEDURE — 63600175 PHARM REV CODE 636 W HCPCS: Performed by: INTERNAL MEDICINE

## 2023-06-14 PROCEDURE — 36556 PR INSERT NON-TUNNEL CV CATH 5+ YRS OLD: ICD-10-PCS | Mod: ,,, | Performed by: INTERNAL MEDICINE

## 2023-06-14 PROCEDURE — 37799 UNLISTED PX VASCULAR SURGERY: CPT

## 2023-06-14 PROCEDURE — 83036 HEMOGLOBIN GLYCOSYLATED A1C: CPT | Performed by: EMERGENCY MEDICINE

## 2023-06-14 PROCEDURE — 27000221 HC OXYGEN, UP TO 24 HOURS

## 2023-06-14 PROCEDURE — 87081 CULTURE SCREEN ONLY: CPT | Performed by: NURSE PRACTITIONER

## 2023-06-14 PROCEDURE — 87186 SC STD MICRODIL/AGAR DIL: CPT | Performed by: NURSE PRACTITIONER

## 2023-06-14 PROCEDURE — 85007 BL SMEAR W/DIFF WBC COUNT: CPT | Performed by: NURSE PRACTITIONER

## 2023-06-14 PROCEDURE — 87070 CULTURE OTHR SPECIMN AEROBIC: CPT | Performed by: NURSE PRACTITIONER

## 2023-06-14 PROCEDURE — 90937 PR HEMODIALYSIS, REPEATED EVAL.: ICD-10-PCS | Mod: ,,, | Performed by: INTERNAL MEDICINE

## 2023-06-14 PROCEDURE — A4217 STERILE WATER/SALINE, 500 ML: HCPCS | Performed by: INTERNAL MEDICINE

## 2023-06-14 PROCEDURE — 82077 ASSAY SPEC XCP UR&BREATH IA: CPT | Performed by: NURSE PRACTITIONER

## 2023-06-14 PROCEDURE — 80053 COMPREHEN METABOLIC PANEL: CPT | Performed by: EMERGENCY MEDICINE

## 2023-06-14 PROCEDURE — 99291 PR CRITICAL CARE, E/M 30-74 MINUTES: ICD-10-PCS | Mod: 25,,, | Performed by: INTERNAL MEDICINE

## 2023-06-14 PROCEDURE — 31500 INSERT EMERGENCY AIRWAY: CPT

## 2023-06-14 PROCEDURE — 25000003 PHARM REV CODE 250: Performed by: EMERGENCY MEDICINE

## 2023-06-14 PROCEDURE — 80320 DRUG SCREEN QUANTALCOHOLS: CPT | Mod: 91 | Performed by: INTERNAL MEDICINE

## 2023-06-14 PROCEDURE — 85027 COMPLETE CBC AUTOMATED: CPT | Performed by: NURSE PRACTITIONER

## 2023-06-14 PROCEDURE — 82803 BLOOD GASES ANY COMBINATION: CPT

## 2023-06-14 PROCEDURE — 80048 BASIC METABOLIC PNL TOTAL CA: CPT | Mod: 91,XB | Performed by: INTERNAL MEDICINE

## 2023-06-14 PROCEDURE — 99900026 HC AIRWAY MAINTENANCE (STAT)

## 2023-06-14 PROCEDURE — 94002 VENT MGMT INPAT INIT DAY: CPT

## 2023-06-14 PROCEDURE — 87040 BLOOD CULTURE FOR BACTERIA: CPT | Mod: 59 | Performed by: EMERGENCY MEDICINE

## 2023-06-14 PROCEDURE — 90937 HEMODIALYSIS REPEATED EVAL: CPT | Mod: ,,, | Performed by: INTERNAL MEDICINE

## 2023-06-14 PROCEDURE — 94761 N-INVAS EAR/PLS OXIMETRY MLT: CPT

## 2023-06-14 PROCEDURE — 81025 URINE PREGNANCY TEST: CPT | Performed by: INTERNAL MEDICINE

## 2023-06-14 PROCEDURE — 51702 INSERT TEMP BLADDER CATH: CPT

## 2023-06-14 PROCEDURE — 36556 INSERT NON-TUNNEL CV CATH: CPT | Mod: ,,, | Performed by: INTERNAL MEDICINE

## 2023-06-14 PROCEDURE — 36415 COLL VENOUS BLD VENIPUNCTURE: CPT | Performed by: EMERGENCY MEDICINE

## 2023-06-14 RX ORDER — MUPIROCIN 20 MG/G
OINTMENT TOPICAL 2 TIMES DAILY
Status: COMPLETED | OUTPATIENT
Start: 2023-06-14 | End: 2023-06-18

## 2023-06-14 RX ORDER — METOPROLOL TARTRATE 1 MG/ML
2.5 INJECTION, SOLUTION INTRAVENOUS ONCE
Status: COMPLETED | OUTPATIENT
Start: 2023-06-14 | End: 2023-06-14

## 2023-06-14 RX ORDER — FENTANYL CITRATE-0.9 % NACL/PF 10 MCG/ML
0-250 PLASTIC BAG, INJECTION (ML) INTRAVENOUS CONTINUOUS
Status: DISCONTINUED | OUTPATIENT
Start: 2023-06-14 | End: 2023-06-14

## 2023-06-14 RX ORDER — FENTANYL CITRATE-0.9 % NACL/PF 10 MCG/ML
0-250 PLASTIC BAG, INJECTION (ML) INTRAVENOUS CONTINUOUS
Status: DISCONTINUED | OUTPATIENT
Start: 2023-06-14 | End: 2023-06-15

## 2023-06-14 RX ORDER — SUCCINYLCHOLINE CHLORIDE 20 MG/ML
INJECTION INTRAMUSCULAR; INTRAVENOUS
Status: DISPENSED
Start: 2023-06-14 | End: 2023-06-14

## 2023-06-14 RX ORDER — HYDRALAZINE HYDROCHLORIDE 20 MG/ML
20 INJECTION INTRAMUSCULAR; INTRAVENOUS
Status: ACTIVE | OUTPATIENT
Start: 2023-06-14 | End: 2023-06-14

## 2023-06-14 RX ORDER — MAGNESIUM SULFATE HEPTAHYDRATE 40 MG/ML
4 INJECTION, SOLUTION INTRAVENOUS ONCE
Status: COMPLETED | OUTPATIENT
Start: 2023-06-14 | End: 2023-06-14

## 2023-06-14 RX ORDER — LORAZEPAM 2 MG/ML
0.5 INJECTION INTRAMUSCULAR
Status: COMPLETED | OUTPATIENT
Start: 2023-06-14 | End: 2023-06-14

## 2023-06-14 RX ORDER — LORAZEPAM 2 MG/ML
INJECTION INTRAMUSCULAR
Status: COMPLETED
Start: 2023-06-14 | End: 2023-06-14

## 2023-06-14 RX ORDER — METOPROLOL TARTRATE 1 MG/ML
2.5 INJECTION, SOLUTION INTRAVENOUS ONCE
Status: DISCONTINUED | OUTPATIENT
Start: 2023-06-14 | End: 2023-06-14

## 2023-06-14 RX ORDER — ONDANSETRON 2 MG/ML
4 INJECTION INTRAMUSCULAR; INTRAVENOUS EVERY 8 HOURS PRN
Status: DISCONTINUED | OUTPATIENT
Start: 2023-06-14 | End: 2023-06-20 | Stop reason: HOSPADM

## 2023-06-14 RX ORDER — INDOMETHACIN 25 MG/1
150 CAPSULE ORAL ONCE
Status: COMPLETED | OUTPATIENT
Start: 2023-06-14 | End: 2023-06-14

## 2023-06-14 RX ORDER — ENOXAPARIN SODIUM 100 MG/ML
40 INJECTION SUBCUTANEOUS EVERY 24 HOURS
Status: DISCONTINUED | OUTPATIENT
Start: 2023-06-14 | End: 2023-06-20 | Stop reason: HOSPADM

## 2023-06-14 RX ORDER — INSULIN ASPART 100 [IU]/ML
1-10 INJECTION, SOLUTION INTRAVENOUS; SUBCUTANEOUS EVERY 6 HOURS PRN
Status: DISCONTINUED | OUTPATIENT
Start: 2023-06-14 | End: 2023-06-17

## 2023-06-14 RX ORDER — FAMOTIDINE 20 MG/1
20 TABLET, FILM COATED ORAL 2 TIMES DAILY
Status: DISCONTINUED | OUTPATIENT
Start: 2023-06-14 | End: 2023-06-14

## 2023-06-14 RX ORDER — FAMOTIDINE 40 MG/5ML
20 POWDER, FOR SUSPENSION ORAL 2 TIMES DAILY
Status: DISCONTINUED | OUTPATIENT
Start: 2023-06-14 | End: 2023-06-16

## 2023-06-14 RX ORDER — CHLORHEXIDINE GLUCONATE ORAL RINSE 1.2 MG/ML
15 SOLUTION DENTAL 2 TIMES DAILY
Status: DISCONTINUED | OUTPATIENT
Start: 2023-06-14 | End: 2023-06-15

## 2023-06-14 RX ORDER — INDOMETHACIN 25 MG/1
50 CAPSULE ORAL
Status: COMPLETED | OUTPATIENT
Start: 2023-06-14 | End: 2023-06-14

## 2023-06-14 RX ORDER — GLUCAGON 1 MG
1 KIT INJECTION
Status: DISCONTINUED | OUTPATIENT
Start: 2023-06-14 | End: 2023-06-17

## 2023-06-14 RX ORDER — MIDAZOLAM HYDROCHLORIDE 1 MG/ML
2 INJECTION INTRAMUSCULAR; INTRAVENOUS ONCE
Status: COMPLETED | OUTPATIENT
Start: 2023-06-14 | End: 2023-06-14

## 2023-06-14 RX ORDER — ETOMIDATE 2 MG/ML
INJECTION INTRAVENOUS
Status: DISPENSED
Start: 2023-06-14 | End: 2023-06-14

## 2023-06-14 RX ORDER — POTASSIUM CHLORIDE 7.45 MG/ML
10 INJECTION INTRAVENOUS
Status: COMPLETED | OUTPATIENT
Start: 2023-06-14 | End: 2023-06-14

## 2023-06-14 RX ORDER — PROPOFOL 10 MG/ML
0-50 INJECTION, EMULSION INTRAVENOUS CONTINUOUS
Status: DISCONTINUED | OUTPATIENT
Start: 2023-06-14 | End: 2023-06-15

## 2023-06-14 RX ORDER — AMOXICILLIN 250 MG
2 CAPSULE ORAL 2 TIMES DAILY
Status: DISCONTINUED | OUTPATIENT
Start: 2023-06-14 | End: 2023-06-16

## 2023-06-14 RX ADMIN — LORAZEPAM 0.5 MG: 2 INJECTION INTRAMUSCULAR; INTRAVENOUS at 01:06

## 2023-06-14 RX ADMIN — POTASSIUM CHLORIDE 10 MEQ: 7.46 INJECTION, SOLUTION INTRAVENOUS at 05:06

## 2023-06-14 RX ADMIN — SODIUM CHLORIDE 1000 ML: 9 INJECTION, SOLUTION INTRAVENOUS at 01:06

## 2023-06-14 RX ADMIN — SENNOSIDES AND DOCUSATE SODIUM 2 TABLET: 50; 8.6 TABLET ORAL at 09:06

## 2023-06-14 RX ADMIN — ENOXAPARIN SODIUM 40 MG: 40 INJECTION SUBCUTANEOUS at 05:06

## 2023-06-14 RX ADMIN — MUPIROCIN: 20 OINTMENT TOPICAL at 08:06

## 2023-06-14 RX ADMIN — FAMOTIDINE 20 MG: 20 TABLET, FILM COATED ORAL at 08:06

## 2023-06-14 RX ADMIN — CHLORHEXIDINE GLUCONATE 0.12% ORAL RINSE 15 ML: 1.2 LIQUID ORAL at 08:06

## 2023-06-14 RX ADMIN — POTASSIUM CHLORIDE 10 MEQ: 7.46 INJECTION, SOLUTION INTRAVENOUS at 12:06

## 2023-06-14 RX ADMIN — PROPOFOL 20 MCG/KG/MIN: 10 INJECTION, EMULSION INTRAVENOUS at 08:06

## 2023-06-14 RX ADMIN — VANCOMYCIN HYDROCHLORIDE 1000 MG: 1 INJECTION, POWDER, LYOPHILIZED, FOR SOLUTION INTRAVENOUS at 08:06

## 2023-06-14 RX ADMIN — LORAZEPAM 0.5 MG: 2 INJECTION INTRAMUSCULAR at 01:06

## 2023-06-14 RX ADMIN — POTASSIUM BICARBONATE 50 MEQ: 978 TABLET, EFFERVESCENT ORAL at 12:06

## 2023-06-14 RX ADMIN — SODIUM BICARBONATE 150 MEQ: 84 INJECTION, SOLUTION INTRAVENOUS at 05:06

## 2023-06-14 RX ADMIN — CEFEPIME 2 G: 2 INJECTION, POWDER, FOR SOLUTION INTRAVENOUS at 01:06

## 2023-06-14 RX ADMIN — SODIUM BICARBONATE: 84 INJECTION, SOLUTION INTRAVENOUS at 01:06

## 2023-06-14 RX ADMIN — METOROPROLOL TARTRATE 2.5 MG: 5 INJECTION, SOLUTION INTRAVENOUS at 01:06

## 2023-06-14 RX ADMIN — AZITHROMYCIN MONOHYDRATE 500 MG: 500 INJECTION, POWDER, LYOPHILIZED, FOR SOLUTION INTRAVENOUS at 02:06

## 2023-06-14 RX ADMIN — MIDAZOLAM 2 MG: 1 INJECTION INTRAMUSCULAR; INTRAVENOUS at 05:06

## 2023-06-14 RX ADMIN — VANCOMYCIN HYDROCHLORIDE 1500 MG: 1.5 INJECTION, POWDER, LYOPHILIZED, FOR SOLUTION INTRAVENOUS at 09:06

## 2023-06-14 RX ADMIN — SENNOSIDES AND DOCUSATE SODIUM 2 TABLET: 50; 8.6 TABLET ORAL at 08:06

## 2023-06-14 RX ADMIN — CEFEPIME 2 G: 2 INJECTION, POWDER, FOR SOLUTION INTRAVENOUS at 05:06

## 2023-06-14 RX ADMIN — Medication 150 MCG/HR: at 09:06

## 2023-06-14 RX ADMIN — Medication 50 MCG/HR: at 02:06

## 2023-06-14 RX ADMIN — FAMOTIDINE 20 MG: 40 POWDER, FOR SUSPENSION ORAL at 08:06

## 2023-06-14 RX ADMIN — SODIUM BICARBONATE: 84 INJECTION, SOLUTION INTRAVENOUS at 09:06

## 2023-06-14 RX ADMIN — POTASSIUM CHLORIDE 10 MEQ: 7.46 INJECTION, SOLUTION INTRAVENOUS at 03:06

## 2023-06-14 RX ADMIN — PROPOFOL 40 MCG/KG/MIN: 10 INJECTION, EMULSION INTRAVENOUS at 09:06

## 2023-06-14 RX ADMIN — PROPOFOL 40 MCG/KG/MIN: 10 INJECTION, EMULSION INTRAVENOUS at 01:06

## 2023-06-14 RX ADMIN — CEFEPIME 2 G: 2 INJECTION, POWDER, FOR SOLUTION INTRAVENOUS at 08:06

## 2023-06-14 RX ADMIN — POTASSIUM CHLORIDE 10 MEQ: 7.46 INJECTION, SOLUTION INTRAVENOUS at 06:06

## 2023-06-14 RX ADMIN — SODIUM BICARBONATE 50 MEQ: 84 INJECTION, SOLUTION INTRAVENOUS at 01:06

## 2023-06-14 RX ADMIN — FOMEPIZOLE 1040 MG: 1 INJECTION, SOLUTION INTRAVENOUS at 01:06

## 2023-06-14 RX ADMIN — MAGNESIUM SULFATE HEPTAHYDRATE 4 G: 40 INJECTION, SOLUTION INTRAVENOUS at 03:06

## 2023-06-14 NOTE — HPI
37 year old female with known medical history of HTN and anxiety    Presented to ED with complaint of blurred vision and color vision change along with difficulty finding words and unsteady gait  She endorsed having felt jittery for a couple days which she thought was anxiety until above changes after arriving to work that morning  ED initial work up for neuro etiology  CT head negative  MRI brain also with no acute finding  Labs revealed severe metabolic acidosis CO2 less than 5; ABG 7.065/9.8/108/2.8 on room air; d dimer 9.38  CTA chest shows dense RUL pneumonia    ED initiated IVF bolus; abx; bicarb IVP and repeat abg did not improve  Critical care team was contacted for admission for sepsis with severe metabolic acidosis and encephalopathy    On my arrival to ED to examine and admit; pt had just had episode of jerking in hands suspicious for seizure and was given small dose IV ativan after which she became unresponsive and required emergent intubation for airway support and mechanical ventilation.

## 2023-06-14 NOTE — PLAN OF CARE
Nutrition Rec: 6/14  1. Recommend pt be initiated onto EN when medically appropriate.   - Peptamen Intense VHP. Initiate a 10 mL/hr, advance as pt tolerates. Aim for goal rate of 55 mL/hr.   - Formula provides 1320 kcal, 121.44 g Protein, 1108.8 mL free water.   - 90 mL fwf q4 hrs (540 mL) or per MD/NP.   - Check Mg, Na, K+, Phos, and Glu before and during initiation, correct as indicated.   2. Weigh weekly.    Goals:   1. Pt will be initiated onto EN within 24-48 hrs.   2. Pt will consume and tolerate >50% of energy needs by RD follow up.  Communication of RD Recs: other (comment) (POC: Sticky note)  Mahamed Lam, Registration Eligible, Provisional LDN

## 2023-06-14 NOTE — ED NOTES
Pt attempted to crawl off of MRI table, rad tech st the patient became anxious. MD Mayorga & primary nurse notified.

## 2023-06-14 NOTE — ASSESSMENT & PLAN NOTE
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 The h/o of vision changes is alarming  DDx: toluene toxicity (through inhalation). Present with non-AG metabolic acidosis and hypokalemia. Included pneumonitis. When chronic, can present with AG metabolic acidosis    Pt needs urgent and acute HD  Discussed with pt  Conveyed to them our concerns  Will send methanol, ethylene glycol, and diethylene glycol (sterno)  Attempted to send toluene, not available in epic

## 2023-06-14 NOTE — PLAN OF CARE
Patient doing well, up OOB to cardiac chair x5 hours today, tolerated well. Has been on trach collar for several hours and tolerating well. Large amount white thick secretions, good cough, able to mobilize secretions. VSS, afebrile so far today. Tolerating tube feeds. Family at bedside, update given, POC discussed.

## 2023-06-14 NOTE — ASSESSMENT & PLAN NOTE
No pseudomonas or MRSA risk factors found  Cover with cefepime and azithromycin with low threshold to escalate  Sputum sent from endotracheal aspirate for culture

## 2023-06-14 NOTE — PROCEDURES
"Shania Tapia is a 37 y.o. female patient.    Temp: (!) 101 °F (38.3 °C) (06/14/23 1500)  Pulse: (!) 127 (06/14/23 1700)  Resp: (!) 32 (06/14/23 1700)  BP: 101/65 (06/14/23 1700)  SpO2: 97 % (06/14/23 1700)  Weight: 69.1 kg (152 lb 5.4 oz) (06/14/23 1017)  Height: 5' 1" (154.9 cm) (06/14/23 1035)       Central Line    Date/Time: 6/14/2023 6:08 PM  Performed by: Jose Woody MD  Authorized by: Jose Woody MD     Location procedure was performed:  [facility] INTENSIVE CARE UNIT  Pre-operative diagnosis:  Anion gap metabolic acidosis  Post-operative diagnosis:  Anion gap metabolic acidosis  Consent Done ?:  Yes  Time out complete?: Verified correct patient, procedure, equipment, staff, and site/side    Indications:  Hemodialysis  Preparation:  Skin prepped with ChloraPrep  Skin prep agent dried: Skin prep agent completely dried prior to procedure    Sterile barriers: All five maximal sterile barriers used - gloves, gown, cap, mask and large sterile sheet    Hand hygiene: Hand hygiene performed immediately prior to central venous catheter insertion    Location:  Right internal jugular  Catheter size:  14 Fr  Inserted Catheter Length (cm):  15  Ultrasound guidance: Yes    Vessel Caliber:  Medium   patent  Comprressibility:  Normal  Needle advanced into vessel with real time ultrasound guidance.    Guidewire confirmed in vessel.    Image recorded and saved.    Steril sheath on probe.    Sterile gel used.  Manometry: No    Number of attempts:  1  Securement:  Line sutured  Complications: No    Estimated blood loss (mL):  3  Guidewire comment:  LUCINA Shay  XRay:  Placement verified by x-ray, no pneumothorax on x-ray, tip termination and successful placement  Adverse Events:  NoneTermination Site: superior vena cava    6/14/2023    "

## 2023-06-14 NOTE — PLAN OF CARE
Pt arrived to ICU at 0340 intubated, sedated w/fentanyl gtt for goal RASS -2.    ST 110s-130s on monitor. Other VSS. Bicarb gtt continued. Additional 150mEq bicarb given following ABGs this AM. Total 2680mL UOP per braxton since admit. Q2 turns, heels floated. Fall precautions in place, bed alarm on. BSWR for pt safety. POC discussed w/pt's  and mom at bs, questions encouraged and answered, verbalized understanding.

## 2023-06-14 NOTE — ASSESSMENT & PLAN NOTE
This patient does have evidence of infective focus  My overall impression is sepsis.  Source: Respiratory (confirmed with CT imaging) and Neurologic (Meningitis/Encephalitis less likely)  Antibiotics given-   Antibiotics (72h ago, onward)    Start     Stop Route Frequency Ordered    06/14/23 0930  ceFEPIme (MAXIPIME) 2 g in dextrose 5 % in water (D5W) 5 % 100 mL IVPB (MB+)         -- IV Every 8 hours (non-standard times) 06/14/23 0136    06/14/23 0900  mupirocin 2 % ointment  (DECOLONIZATION PROTOCOL ORDERS)         06/19 0859 Nasl 2 times daily 06/14/23 0122    06/14/23 0135  azithromycin (ZITHROMAX) 500 mg in dextrose 5 % (D5W) 250 mL IVPB (Vial-Mate)         06/21 0144 IV Every 24 hours (non-standard times) 06/14/23 0136        Latest lactate reviewed-  Recent Labs   Lab 06/13/23  2237 06/14/23  0159   LACTATE 2.3* 3.9*     Organ dysfunction indicated by Encephalopathy    Fluid challenge Not needed - patient is not hypotensive      Post- resuscitation assessment No - Post resuscitation assessment not needed       Will Not start Pressors- Levophed for MAP of 65  Source control achieved by: abx as above

## 2023-06-14 NOTE — ASSESSMENT & PLAN NOTE
Respiratory failure  RUL consolidation  Infectious? Or secondary to inhalation of a substance (pneumonitis?)

## 2023-06-14 NOTE — ASSESSMENT & PLAN NOTE
Negative CT Head and MRI in ED  Suspect related to sepsis and no MRI enhancement but given severity, may need LP today for CSF studies to rule out meningitis/encephalitis  Broad spectrum abx and supportive care

## 2023-06-14 NOTE — SUBJECTIVE & OBJECTIVE
Past Medical History:   Diagnosis Date    Abnormal Pap smear     Abnormal Pap smear of vagina     Anemia     Anxiety     Hypertension        Past Surgical History:   Procedure Laterality Date    CERVICAL BIOPSY      Conization        Review of patient's allergies indicates:   Allergen Reactions    Latex Rash    Latex, natural rubber Rash     Current Facility-Administered Medications   Medication Frequency    azithromycin (ZITHROMAX) 500 mg in dextrose 5 % (D5W) 250 mL IVPB (Vial-Mate) Q24H    ceFEPIme (MAXIPIME) 2 g in dextrose 5 % in water (D5W) 5 % 100 mL IVPB (MB+) Q8H    chlorhexidine 0.12 % solution 15 mL BID    dextrose 10% bolus 125 mL 125 mL PRN    dextrose 10% bolus 250 mL 250 mL PRN    enoxaparin injection 40 mg Daily    famotidine 40 mg/5 mL (8 mg/mL) suspension 20 mg BID    fentaNYL 2500 mcg in 0.9% sodium chloride 250 mL infusion premix (titrating) Continuous    [START ON 6/15/2023] fomepizole (ANTIZOL) 690 mg in dextrose 5 % (D5W) 100 mL IVPB Q12H    glucagon (human recombinant) injection 1 mg PRN    insulin aspart U-100 pen 1-10 Units Q6H PRN    mupirocin 2 % ointment BID    ondansetron injection 4 mg Q8H PRN    propofol (DIPRIVAN) 10 mg/mL infusion Continuous    senna-docusate 8.6-50 mg per tablet 2 tablet BID    sterile water 1,000 mL with potassium chloride 40 mEq, sodium bicarbonate 150 mEq infusion Continuous    vancomycin (VANCOCIN) 1,000 mg in dextrose 5 % (D5W) 250 mL IVPB (Vial-Mate) Q12H    vancomycin - pharmacy to dose pharmacy to manage frequency     Family History       Problem Relation (Age of Onset)    Diabetes Paternal Grandmother    Heart attack Father    Hypertension Father          Tobacco Use    Smoking status: Never    Smokeless tobacco: Never   Substance and Sexual Activity    Alcohol use: No    Drug use: No    Sexual activity: Yes     Partners: Male     Birth control/protection: None     Review of Systems   Unable to perform ROS: Intubated   Objective:     Vital Signs (Most  Recent):  Temp: (!) 101 °F (38.3 °C) (06/14/23 1500)  Pulse: (!) 127 (06/14/23 1700)  Resp: (!) 32 (06/14/23 1700)  BP: 101/65 (06/14/23 1700)  SpO2: 97 % (06/14/23 1700) Vital Signs (24h Range):  Temp:  [97.7 °F (36.5 °C)-101 °F (38.3 °C)] 101 °F (38.3 °C)  Pulse:  [112-150] 127  Resp:  [18-51] 32  SpO2:  [89 %-100 %] 97 %  BP: ()/() 101/65  Arterial Line BP: ()/(46-65) 99/63     Weight: 69.1 kg (152 lb 5.4 oz) (06/14/23 1017)  Body mass index is 28.78 kg/m².  Body surface area is 1.72 meters squared.    I/O last 3 completed shifts:  In: 3739.1 [I.V.:640.1; IV Piggyback:3099]  Out: 2580 [Urine:2580]     Physical Exam  Vitals and nursing note reviewed.   HENT:      Head: Normocephalic and atraumatic.   Cardiovascular:      Rate and Rhythm: Normal rate and regular rhythm.      Pulses: Normal pulses.      Heart sounds: Normal heart sounds.   Pulmonary:      Comments: Decreased BS's on the right  Abdominal:      General: Abdomen is flat.      Palpations: Abdomen is soft.   Musculoskeletal:      Right lower leg: No edema.      Left lower leg: No edema.        Significant Labs: reviewed  BMP  Lab Results   Component Value Date     06/14/2023    K 3.0 (L) 06/14/2023     06/14/2023    CO2 12 (L) 06/14/2023    BUN 10 06/14/2023    CREATININE 0.8 06/14/2023    CALCIUM 8.4 (L) 06/14/2023    ANIONGAP 26 (H) 06/14/2023    EGFRNORACEVR >60 06/14/2023     ABG  Recent Labs   Lab 06/14/23  1249   PH 7.277*   PO2 84   PCO2 22.6*   HCO3 10.6*   BE -16   Initial ABG showed PH of 7.0    Serum Osmolality calculated = 294; measured = 351  Lactic acid 3.9 initially, now normal  Salicylate neg  Alcohol not high       Significant Imaging: reviewed CXR and CT chest, noted right sided consolidation  Head CT, MRI and MRA reviewed

## 2023-06-14 NOTE — PROGRESS NOTES
Pharmacist Renal Dose Adjustment Note    Shania Tapia is a 37 y.o. female being treated with the medication famotidine    Patient Data:    Vital Signs (Most Recent):  Temp: 98.6 °F (37 °C) (06/13/23 2045)  Pulse: (!) 119 (06/14/23 0101)  Resp: (!) 28 (06/14/23 0101)  BP: (!) 158/87 (06/14/23 0101)  SpO2: (!) 91 % (06/14/23 0101) Vital Signs (72h Range):  Temp:  [98.4 °F (36.9 °C)-98.6 °F (37 °C)]   Pulse:  [119-144]   Resp:  [18-28]   BP: (123-167)/()   SpO2:  [91 %-100 %]      Recent Labs   Lab 06/13/23  1415 06/14/23 0046   CREATININE 1.1 0.9     Serum creatinine: 0.9 mg/dL 06/14/23 0046  Estimated creatinine clearance: 75.3 mL/min    Famotidine 20 mg QD will be changed to famotidine 20 mg BID for CrCl >50 mL/min.     Pharmacist's Name: Sunshine Major  Pharmacist's Extension: [phone]

## 2023-06-14 NOTE — CONSULTS
[facility] ([facility])  Nephrology  Consult Note    Patient Name: Shania Tapia  MRN: [id number]  Admission Date: 6/13/2023  Hospital Length of Stay: 0 days  Attending Provider: Jose Woody MD   Primary Care Physician: Flavia Fink DO  Principal Problem:Severe sepsis    Reason for consult: metabolic acidosis    Consults  Subjective:     HPI: Thank you for referring the pt to us. H/o and chart were reviewed. Pt was seen and examined. Pt is a 36 y/o female who presented initially for vague symptoms related to weakness. Pt had tremors, jitteriness, anxiety, and reported vision changes specially with colors, reported vision deficit or loss (unsure) and had profound metabolic acidosis (PH 7.0). Pt was intubated for respiratory distress. H/o was obtained form her  and mother. Pt has a h/o of HTN and anxiety. She has experienced intentional wt loss about 20 lbs reported by her  in the past 2 months. She does not eat well. 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 Pt has not been seen wearing a mask there. Per family, she does not drink ethanol alcohol. Renal service was consulted for severe metabolic acidosis and osmolal gap.      Past Medical History:   Diagnosis Date    Abnormal Pap smear     Abnormal Pap smear of vagina     Anemia     Anxiety     Hypertension        Past Surgical History:   Procedure Laterality Date    CERVICAL BIOPSY      Conization        Review of patient's allergies indicates:   Allergen Reactions    Latex Rash    Latex, natural rubber Rash     Current Facility-Administered Medications   Medication Frequency    azithromycin (ZITHROMAX) 500 mg in dextrose 5 % (D5W) 250 mL IVPB (Vial-Mate) Q24H    ceFEPIme (MAXIPIME) 2 g in dextrose 5 % in water (D5W) 5 % 100 mL IVPB (MB+) Q8H    chlorhexidine 0.12 % solution 15 mL BID    dextrose 10% bolus 125 mL 125 mL PRN    dextrose 10% bolus 250 mL 250 mL  PRN    enoxaparin injection 40 mg Daily    famotidine 40 mg/5 mL (8 mg/mL) suspension 20 mg BID    fentaNYL 2500 mcg in 0.9% sodium chloride 250 mL infusion premix (titrating) Continuous    [START ON 6/15/2023] fomepizole (ANTIZOL) 690 mg in dextrose 5 % (D5W) 100 mL IVPB Q12H    glucagon (human recombinant) injection 1 mg PRN    insulin aspart U-100 pen 1-10 Units Q6H PRN    mupirocin 2 % ointment BID    ondansetron injection 4 mg Q8H PRN    propofol (DIPRIVAN) 10 mg/mL infusion Continuous    senna-docusate 8.6-50 mg per tablet 2 tablet BID    sterile water 1,000 mL with potassium chloride 40 mEq, sodium bicarbonate 150 mEq infusion Continuous    vancomycin (VANCOCIN) 1,000 mg in dextrose 5 % (D5W) 250 mL IVPB (Vial-Mate) Q12H    vancomycin - pharmacy to dose pharmacy to manage frequency     Family History       Problem Relation (Age of Onset)    Diabetes Paternal Grandmother    Heart attack Father    Hypertension Father          Tobacco Use    Smoking status: Never    Smokeless tobacco: Never   Substance and Sexual Activity    Alcohol use: No    Drug use: No    Sexual activity: Yes     Partners: Male     Birth control/protection: None     Review of Systems   Unable to perform ROS: Intubated   Objective:     Vital Signs (Most Recent):  Temp: (!) 101 °F (38.3 °C) (06/14/23 1500)  Pulse: (!) 127 (06/14/23 1700)  Resp: (!) 32 (06/14/23 1700)  BP: 101/65 (06/14/23 1700)  SpO2: 97 % (06/14/23 1700) Vital Signs (24h Range):  Temp:  [97.7 °F (36.5 °C)-101 °F (38.3 °C)] 101 °F (38.3 °C)  Pulse:  [112-150] 127  Resp:  [18-51] 32  SpO2:  [89 %-100 %] 97 %  BP: ()/() 101/65  Arterial Line BP: ()/(46-65) 99/63     Weight: 69.1 kg (152 lb 5.4 oz) (06/14/23 1017)  Body mass index is 28.78 kg/m².  Body surface area is 1.72 meters squared.    I/O last 3 completed shifts:  In: 3739.1 [I.V.:640.1; IV Piggyback:3099]  Out: 2580 [Urine:2580]     Physical Exam  Vitals and nursing note reviewed.    HENT:      Head: Normocephalic and atraumatic.   Cardiovascular:      Rate and Rhythm: Normal rate and regular rhythm.      Pulses: Normal pulses.      Heart sounds: Normal heart sounds.   Pulmonary:      Comments: Decreased BS's on the right  Abdominal:      General: Abdomen is flat.      Palpations: Abdomen is soft.   Musculoskeletal:      Right lower leg: No edema.      Left lower leg: No edema.        Significant Labs: reviewed  BMP  Lab Results   Component Value Date     06/14/2023    K 3.0 (L) 06/14/2023     06/14/2023    CO2 12 (L) 06/14/2023    BUN 10 06/14/2023    CREATININE 0.8 06/14/2023    CALCIUM 8.4 (L) 06/14/2023    ANIONGAP 26 (H) 06/14/2023    EGFRNORACEVR >60 06/14/2023     ABG  Recent Labs   Lab 06/14/23  1249   PH 7.277*   PO2 84   PCO2 22.6*   HCO3 10.6*   BE -16   Initial ABG showed PH of 7.0    Serum Osmolality calculated = 294; measured = 351  Lactic acid 3.9 initially, now normal  Salicylate neg  Alcohol not high       Significant Imaging: reviewed CXR and CT chest, noted right sided consolidation  Head CT, MRI and MRA reviewed    Assessment/Plan:     38 y/o female with profound metabolic acidosis and possibly toxin exposure:    Metabolic acidosis  Pt presented with profound acidemia  Has elevated AG metabolic acidosis  Has osmolal gap  Ethanol alcohol level not high  Normal s Cr  Hypokalemia   Reported vision changes/blindness before arrival  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 The h/o of vision changes is alarming  DDx: toluene toxicity (through inhalation). Present with non-AG metabolic acidosis and hypokalemia. Included pneumonitis. When chronic, can present with AG metabolic acidosis    Pt needs urgent and acute HD  Discussed with pt  Conveyed to them our concerns  Pt received multiple visits and evaluations in ICU  Will send methanol, ethylene glycol, and  diethylene glycol (sterno)  Attempted to send toluene, not available in epic    On mechanically assisted ventilation  Respiratory failure  RUL consolidation  Infectious? Or secondary to inhalation of a substance (pneumonitis?)    Encephalopathy  Reviewed head studies.  Will discuss with ICU    Plans and recommendations:  As detailed above  Total critical care time spent 70 minutes including time needed to review the records, the   patient evaluation, documentation, face-to-face discussion with the patient's family  more than 50% of the time was spent on coordination of care and counseling.    Level V visit.  Multiple medical issues were addressed, as documented. Medical care provided was in addition to providing dialysis. Pt received multiple visits and evaluations.      Chika Arcos MD   Nephrology  [facility] ([facility])

## 2023-06-14 NOTE — HPI
Thank you for referring the pt to us. H/o and chart were reviewed. Pt was seen and examined. Pt is a 36 y/o female who presented initially for vague symptoms related to weakness. Pt had tremors, jitteriness, anxiety, and reported vision changes specially with colors, reported vision deficit or loss (unsure) and had profound metabolic acidosis (PH 7.0). Pt was intubated for respiratory distress. H/o was obtained form her  and mother. Pt has a h/o of HTN and anxiety. She has experienced intentional wt loss about 20 lbs reported by her  in the past 2 months. She does not eat well. 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 Pt has not been seen wearing a mask there. Per family, she does not drink ethanol alcohol. Renal service was consulted for severe metabolic acidosis and osmolal gap.

## 2023-06-14 NOTE — CONSULTS
[facility] ([facility])  Adult Nutrition  Consult Note    SUMMARY     Recommendations  1. Recommend pt be initiated onto EN when medically appropriate.   - Peptamen Intense VHP. Initiate a 10 mL/hr, advance as pt tolerates. Aim for goal rate of 55 mL/hr.   - Formula provides 1320 kcal, 121.44 g Protein, 1108.8 mL free water.   - 90 mL fwf q4 hrs (540 mL) or per MD/NP.   - Check Mg, Na, K+, Phos, and Glu before and during initiation, correct as indicated.   2. Weigh weekly.    Goals:   1. Pt will be initiated onto EN within 24-48 hrs.   2. Pt will consume and tolerate >50% of energy needs by RD follow up.  Nutrition Goal Status: new  Communication of RD Recs: other (comment) (POC: Sticky note)  Mahamed Lam, Registration Eligible, Provisional LDN  Assessment and Plan    Nutrition Problem  Inadequate PO intake    Related to (etiology):   Decreased ability to consumed sufficient nutrition     Signs and Symptoms (as evidenced by):   NPO, Vent    Interventions(treatment strategy):  1. Enteral Nutrition  2. Collaboration of care with medical providers.    Nutrition Diagnosis Status:   New       Malnutrition Assessment     Skin (Micronutrient): dry (Torres 15)                                 Reason for Assessment    Reason For Assessment: consult  Diagnosis: cardiac disease  Relevant Medical History: Anxiety, HTN  Interdisciplinary Rounds: did not attend  General Information Comments:   6/14: 36 y/o female admitted w/ active principal problem of severe sepsis. Pt moved to ICU x7 hrs ago, Intubated (total Ve: 13.7 @9:01am) and sedated (Propofol: 12.4 @ 9:00am) Presented to ED with complaint of blurred vision and color vision change along with difficulty finding words and unsteady gait  She endorsed having felt jittery for a couple days which she thought was anxiety until above changes after arriving to work that morning  ED initial work up for neuro etiology  CT head negative  MRI brain also with no acute  "finding  Labs revealed severe metabolic acidosis CO2 less than 5; ABG 7.065/9.8/108/2.8 on room air; d dimer 9.38  CTA chest shows dense RUL pneumonia. MD notes, On my arrival to ED to examine and admit; pt had just had episode of jerking in hands suspicious for seizure and was given small dose IV ativan after which she became unresponsive and required emergent intubation for airway support and mechanical ventilation. NFPE not performed per pt intubated and sedated. Pt is currently charted to weigh 152 lbs 5.4 oz, BMI 28.78 (overweight) Pt LBM was 6/13, no stool consistency charted. Dietitian will continue to monitor.  Nutrition Discharge Planning: Low Na diet    Wt Readings from Last 20 Encounters:   06/14/23 69.1 kg (152 lb 5.4 oz)   10/20/22 76.9 kg (169 lb 8.5 oz)   08/28/22 76.1 kg (167 lb 14.1 oz)   08/26/22 76.7 kg (169 lb 1.5 oz)   10/20/21 79 kg (174 lb 2.6 oz)   04/20/21 77.6 kg (171 lb 1.2 oz)   10/20/20 78.1 kg (172 lb 2.9 oz)   09/23/20 78.1 kg (172 lb 2.9 oz)   12/18/18 84.2 kg (185 lb 10 oz)   12/29/15 78.2 kg (172 lb 6.4 oz)   12/01/14 73.9 kg (163 lb)   ]    Nutrition Risk Screen    Nutrition Risk Screen: tube feeding or parenteral nutrition    Nutrition/Diet History    Spiritual, Cultural Beliefs, Orthodox Practices, Values that Affect Care: no  Supplemental Drinks or Food Habits: Other (see comments) (Latex)  Food Allergies:  (Latex)  Factors Affecting Nutritional Intake: impaired cognitive status/motor control, NPO, on mechanical ventilation    Anthropometrics    Temp: 100.2 °F (37.9 °C)  Height: 5' 1" (154.9 cm)  Height (inches): 61 in  Weight Method: Standard Scale  Weight: 69.1 kg (152 lb 5.4 oz)  Weight (lb): 152.34 lb  Ideal Body Weight (IBW), Female: 105 lb  % Ideal Body Weight, Female (lb): 145.09 %  BMI (Calculated): 28.8  BMI Grade: 25 - 29.9 - overweight       Lab/Procedures/Meds  BMP  Lab Results   Component Value Date     06/14/2023    K 4.6 06/14/2023     06/14/2023    " CO2 5 (LL) 06/14/2023    BUN 9 06/14/2023    CREATININE 0.9 06/14/2023    CALCIUM 8.0 (L) 06/14/2023    ANIONGAP 28 (H) 06/14/2023    EGFRNORACEVR >60 06/14/2023       Pertinent Labs Reviewed: reviewed  Pertinent Medications Reviewed: reviewed  Scheduled Meds:   azithromycin  500 mg Intravenous Q24H    ceFEPime (MAXIPIME) IVPB  2 g Intravenous Q8H    chlorhexidine  15 mL Mouth/Throat BID    enoxaparin  40 mg Subcutaneous Daily    etomidate        famotidine  20 mg Per OG tube BID    hydrALAZINE  20 mg Intravenous ED 1 Time    mupirocin   Nasal BID    senna-docusate 8.6-50 mg  2 tablet Per OG tube BID    succinylcholine         Continuous Infusions:   fentanyl 150 mcg/hr (06/14/23 1100)    propofoL 40 mcg/kg/min (06/14/23 1100)    sodium bicarbonate drip 150 mL/hr at 06/14/23 1100     PRN Meds:.dextrose 10%, dextrose 10%, glucagon (human recombinant), insulin aspart U-100, ondansetron, Pharmacy to dose Vancomycin consult **AND** vancomycin - pharmacy to dose    Estimated/Assessed Needs    Weight Used For Calorie Calculations: 69.1 kg (152 lb 5.4 oz)  Energy Calorie Requirements (kcal): 1648 (Total Ve: 13.7)  Energy Need Method: [facility]  Protein Requirements:  (1.2-2.0 g/kg per Critical care Vent)  Weight Used For Protein Calculations: 69.1 kg (152 lb 5.4 oz)  Fluid Requirements (mL): 1648  Estimated Fluid Requirement Method: RDA Method  RDA Method (mL): 1648  CHO Requirement: 206      Nutrition Prescription Ordered    Current Diet Order: NPO    Evaluation of Received Nutrient/Fluid Intake    I/O: +539.1 Since admit  Energy Calories Required: not meeting needs  Protein Required: not meeting needs  Fluid Required: exceeds needs  Tolerance: not tolerating  % Intake of Estimated Energy Needs: 0 - 25 %  % Meal Intake: NPO    Nutrition Risk    Level of Risk/Frequency of Follow-up: high (x2 weekly)       Monitor and Evaluation    Food and Nutrient Intake: energy intake, enteral nutrition intake  Food and Nutrient  Adminstration: enteral and parenteral nutrition administration  Knowledge/Beliefs/Attitudes: food and nutrition knowledge/skill, beliefs and attitudes  Physical Activity and Function: nutrition-related ADLs and IADLs, factors affecting access to physical activity  Anthropometric Measurements: weight, weight change, body mass index  Biochemical Data, Medical Tests and Procedures: electrolyte and renal panel, gastrointestinal profile, glucose/endocrine profile, inflammatory profile, lipid profile  Nutrition-Focused Physical Findings: overall appearance, skin, extremities, muscles and bones, head and eyes       Nutrition Follow-Up    RD Follow-up?: Yes  Mahamed Lam, Registration Eligible, Provisional LDN

## 2023-06-14 NOTE — PLAN OF CARE
Patient resting quietly in bed on vent, respirations less labored this evening. Weaned propofol down from highest setting. Fever climbing, heart rate tachycardic, treated earlier with metoprolol with moderate response. Spouse and mother at bedside, update given, POC discussed.

## 2023-06-14 NOTE — EICU
"eICU Note : New Admit :notified by the [facility]:    Brief HPI: Blurred Vision     37-year-old female with past medical history of hypertension who presented to the [facility] emergency department for evaluation of blurred vision which started 2 days ago.  Patient had bilateral tremors, aphasia, word finding difficulty, unsteady gait and anxiety.  CT chest showed : Large R sided Pneumonia     Vital Signs :  Blood pressure 139/77, pulse (!) 116, temperature 97.9 °F (36.6 °C), temperature source Oral, resp. rate (!) 32, height 5' 1" (1.549 m), weight 69.1 kg (152 lb 5.4 oz), SpO2 100 %,        Camera Assessment :Patient lying in bed on mechanical ventilator  Vent Mode: A/C  Oxygen Concentration (%):  60  Resp Rate Total:  34 br/min  Vt Set:  [420 mL] 420 mL  PEEP/CPAP:  [8 cmH20] 8 cmH20  Mean Airway Pressure:  [14 cmH20-15 cmH20] 14 cmH20     Data:  AB.0194/22.8/136/59/97 on FiO2 60%, tidal volume 420, PEEP of +8, respiratory rate  26 /mt   Lactate 3.9   TSH : 1.3   Urine drug screen :  Positive for Opiates  Sodium 140, potassium 3.8, chloride 108, CO2 less than 5, BUN 7, creatinine 0.9, EGFR is greater than 60, glucose 164, albumin 4.0   Impression and recommendations:  Acute Respiratory failure  s/p Intubation on mechanical ventilation :Vent Mode: A/C  Oxygen Concentration (%):  [] 60%  Resp Rate Total:  34 br/min  Vt Set: 420 m  PEEP/CPAP: 8 cmH20  Mean Airway Pressure: 14 cm H20  2.Pneumonia : Rt upper lobe : Azithromycin and Cefepime   3.Metabolic Acidosis : Bicarb <5 , Lactate 3.9 , On Bicarb GTT , Repeat BMP and ABG   4. PUD, DVT prophylaxis : SCD and PPI           Laura Stark M.D  eICU Physician   "

## 2023-06-14 NOTE — PLAN OF CARE
[facility] (H[facility])  Initial Discharge Assessment       Primary Care Provider: Flavia Fink DO    Admission Diagnosis: Hyperventilation [R06.4]  Metabolic acidosis [E87.20]  Tachycardia [R00.0]  Severe sepsis [A41.9, R65.20]  Acute respiratory failure, unspecified whether with hypoxia or hypercapnia [J96.00]  Pneumonia of right upper lobe due to infectious organism [J18.9]    Admission Date: 6/13/2023  Expected Discharge Date:     Transition of Care Barriers: None    Payor: [employer] [facility] / Plan: BCBS ALL OUT OF STATE / Product Type: PPO /     Extended Emergency Contact Information  Primary Emergency Contact: Vladislav Tapia  Home Phone: [phone]  Relation: Spouse  Preferred language: English   needed? No  Secondary Emergency Contact: Keira Edgar  Address: [address]           [place] [address] [facility]  Home Phone: [phone]  Mobile Phone: [phone]  Relation: Mother    Discharge Plan A: Home with family         [id number] [place] - [address] [facility]  [address]  [place] [address]  Phone: [phone] Fax: [phone]      Initial Assessment (most recent)       Adult Discharge Assessment - 06/14/23 1018          Discharge Assessment    Assessment Type Discharge Planning Assessment     Confirmed/corrected address, phone number and insurance Yes     Confirmed Demographics Correct on Facesheet     Source of Information patient     When was your last doctors appointment? 10/20/23     Communicated MANDEEP with patient/caregiver Date not available/Unable to determine     Reason For Admission Severe sepsis     People in Home child(jose juan), dependent;parent(s);spouse     Facility Arrived From: home     Do you expect to return to your current living situation? Yes     Do you have help at home or someone to help you manage your care at home? Yes     Who are your caregiver(s) and their phone  number(s)? Mother, Keira and spouse, Vladislav     Prior to hospitilization cognitive status: Alert/Oriented     Current cognitive status: Coma/Sedated/Intubated     Home Accessibility wheelchair accessible     Home Layout Able to live on 1st floor     Equipment Currently Used at Home none     Readmission within 30 days? No     Patient currently being followed by outpatient case management? No     Do you currently have service(s) that help you manage your care at home? No     Do you take prescription medications? Yes     Do you have prescription coverage? Yes     Coverage Commercial     Do you have any problems affording any of your prescribed medications? No     Is the patient taking medications as prescribed? yes     Who is going to help you get home at discharge? Family     How do you get to doctors appointments? car, drives self     Are you on dialysis? No     Do you take coumadin? No     Discharge Plan A Home with family     DME Needed Upon Discharge  none     Discharge Plan discussed with: Parent(s)     Name(s) and Number(s) Keira Edgar     Transition of Care Barriers None        Physical Activity    On average, how many days per week do you engage in moderate to strenuous exercise (like a brisk walk)? 2 days     On average, how many minutes do you engage in exercise at this level? 20 min        Financial Resource Strain    How hard is it for you to pay for the very basics like food, housing, medical care, and heating? Not hard at all        Housing Stability    In the last 12 months, was there a time when you were not able to pay the mortgage or rent on time? No     In the last 12 months, how many places have you lived? 1     In the last 12 months, was there a time when you did not have a steady place to sleep or slept in a shelter (including now)? No        Transportation Needs    In the past 12 months, has lack of transportation kept you from medical appointments or from getting medications? No     In the  past 12 months, has lack of transportation kept you from meetings, work, or from getting things needed for daily living? No        Food Insecurity    Within the past 12 months, you worried that your food would run out before you got the money to buy more. Never true     Within the past 12 months, the food you bought just didn't last and you didn't have money to get more. Never true                   Anticipated DC dispo: home with family   Prior Level of Function: independent with ADLs   PCP: Flavia Fink DO     Comments:  CM met with patient's mother at bedside to introduce role and discuss discharge planning. Patient lives with her mother, spouse and minor child. Family will be help at home and can provide transport at time of discharge. Patient is independent with all ADLs, drives, and does not use or own any DME. CM discharge needs depends on hospital progress. CM will continue following to assist with other needs.

## 2023-06-14 NOTE — SUBJECTIVE & OBJECTIVE
Past Medical History:   Diagnosis Date    Abnormal Pap smear     Abnormal Pap smear of vagina     Anemia     Anxiety     Hypertension        Past Surgical History:   Procedure Laterality Date    CERVICAL BIOPSY      Conization        Review of patient's allergies indicates:   Allergen Reactions    Latex Rash    Latex, natural rubber Rash       Family History       Problem Relation (Age of Onset)    Diabetes Paternal Grandmother    Heart attack Father    Hypertension Father          Tobacco Use    Smoking status: Never    Smokeless tobacco: Never   Substance and Sexual Activity    Alcohol use: No    Drug use: No    Sexual activity: Yes     Partners: Male     Birth control/protection: None         Review of Systems   Unable to perform ROS: Patient unresponsive   Objective:     Vital Signs (Most Recent):  Temp: 97.9 °F (36.6 °C) (06/14/23 0341)  Pulse: (!) 116 (06/14/23 0341)  Resp: (!) 32 (06/14/23 0341)  BP: 139/77 (06/14/23 0341)  SpO2: 100 % (06/14/23 0341) Vital Signs (24h Range):  Temp:  [97.9 °F (36.6 °C)-98.6 °F (37 °C)] 97.9 °F (36.6 °C)  Pulse:  [112-144] 116  Resp:  [18-32] 32  SpO2:  [91 %-100 %] 100 %  BP: (123-167)/() 139/77     Weight: 69.1 kg (152 lb 5.4 oz)  Body mass index is 28.78 kg/m².      Intake/Output Summary (Last 24 hours) at 6/14/2023 0409  Last data filed at 6/14/2023 0303  Gross per 24 hour   Intake 3099 ml   Output --   Net 3099 ml        Physical Exam  Vitals and nursing note reviewed.   Constitutional:       Appearance: She is ill-appearing.      Interventions: She is intubated.   HENT:      Head: Atraumatic.   Eyes:      Conjunctiva/sclera: Conjunctivae normal.      Comments: Pupils round, equal, and responsive to light   Neck:      Vascular: No JVD.   Cardiovascular:      Rate and Rhythm: Regular rhythm. Tachycardia present.      Pulses:           Radial pulses are 2+ on the right side and 2+ on the left side.        Dorsalis pedis pulses are 2+ on the right side and 2+ on the  left side.   Pulmonary:      Effort: She is intubated.      Breath sounds: Decreased breath sounds present.   Abdominal:      General: There is no distension.      Palpations: Abdomen is soft.   Musculoskeletal:      Right lower leg: No edema.      Left lower leg: No edema.   Skin:     General: Skin is warm and dry.      Capillary Refill: Capillary refill takes less than 2 seconds.      Findings: No lesion or wound.        Vents:  Vent Mode: A/C (06/14/23 0339)  Set Rate: 26 BPM (06/14/23 0339)  Vt Set: 420 mL (06/14/23 0339)  PEEP/CPAP: 8 cmH20 (06/14/23 0339)  Oxygen Concentration (%): 60 (06/14/23 0344)  Peak Airway Pressure: 16 cmH20 (06/14/23 0339)  Plateau Pressure: 0 cmH20 (06/14/23 0339)  Total Ve: 13.6 L/m (06/14/23 0339)  Negative Inspiratory Force (cm H2O): 0 (06/14/23 0339)  F/VT Ratio<105 (RSBI): (!) 31.98 (06/14/23 0339)    Lines/Drains/Airways       Drain  Duration                  Urethral Catheter 06/14/23 0320 Non-latex;Silicone 16 Fr. <1 day              Airway  Duration                  Airway - Non-Surgical 06/14/23 0200 Endotracheal Tube <1 day              Peripheral Intravenous Line  Duration                  Peripheral IV - Double Lumen 06/14/23 0140 18 G;20 G Right Antecubital <1 day         Peripheral IV - Single Lumen 06/14/23 0149 20 G Left Antecubital <1 day                    Significant Labs:    CBC/Anemia Profile:  Recent Labs   Lab 06/13/23  1415   WBC 15.11*   HGB 9.9*   HCT 34.5*   *   MCV 75*   RDW 21.4*        Chemistries:  Recent Labs   Lab 06/13/23  1415 06/14/23  0046   * 140   K 3.1* 3.8    108   CO2 <5* <5*   BUN 8 7   CREATININE 1.1 0.9   CALCIUM 8.9 9.1   ALBUMIN 4.3 4.0   PROT 9.1* 8.8*   BILITOT 0.6 0.6   ALKPHOS 87 106   ALT 24 23   AST 32 31       Lactic Acid:   Recent Labs   Lab 06/13/23 2237 06/14/23  0159   LACTATE 2.3* 3.9*     Troponin:   Recent Labs   Lab 06/13/23  1415   TROPONINI <0.006     All pertinent labs within the past 24 hours have  been reviewed.    Significant Imaging:   I have reviewed all pertinent imaging results/findings within the past 24 hours.

## 2023-06-14 NOTE — NURSING
Spoke with Dr Woody this morning at 08:00 and again at 12:45 to review patient's heart rate. Pt's heart rate has been sustained 130 to 145. Dr woody states he does not want to treat at this time

## 2023-06-14 NOTE — CONSULTS
Pharmacokinetic Initial Assessment: IV Vancomycin    Assessment/Plan:  Initiate intravenous vancomycin with loading dose of 1500 mg once followed by a maintenance dose of vancomycin 1000 mg IV every 12 hours  Desired empiric serum trough concentration is 15 to 20 mcg/mL  Draw vancomycin trough level 60 min prior to fourth dose on 6/15 at approximately 2030  Pharmacy will continue to follow and monitor vancomycin.      Please contact pharmacy at extension [phone] with any questions regarding this assessment.     Thank you for the consult,   Katherine McArdle, Pharm.D. 6/14/2023 4:03 PM      Patient brief summary:  Shania Tapia is a 37 y.o. female initiated on antimicrobial therapy with IV Vancomycin for treatment of suspected lower respiratory infection    Drug Allergies:   Review of patient's allergies indicates:   Allergen Reactions    Latex Rash    Latex, natural rubber Rash       Actual Body Weight:   69.1 kg    Renal Function:   Estimated Creatinine Clearance: 84.7 mL/min (based on SCr of 0.8 mg/dL).      CBC (last 72 hours):  Recent Labs   Lab Result Units 06/13/23  1415 06/14/23  0431   WBC K/uL 15.11* 25.53*   Hemoglobin g/dL 9.9* 9.8*   Hematocrit % 34.5* 35.6*   Platelets K/uL 122* 114*   Gran % % 87.0* 82.0*   Lymph % % 1.0* 6.0*   Mono % % 6.0 4.0   Eosinophil % % 0.0 0.0   Basophil % % 0.0 0.0   Differential Method  Manual Manual       Metabolic Panel (last 72 hours):  Recent Labs   Lab Result Units 06/13/23  1415 06/13/23  2200 06/14/23  0046 06/14/23  0432 06/14/23  0718 06/14/23  1155   Sodium mmol/L 134*  --  140 138 141 142   Potassium mmol/L 3.1*  --  3.8 4.7 4.6 2.4*   Chloride mmol/L 100  --  108 108 108 109   CO2 mmol/L <5*  --  <5* <5* 5* 9*   Glucose mg/dL 171*  --  164* 225* 151* 115*   BUN mg/dL 8  --  7 8 9 9   Creatinine mg/dL 1.1  --  0.9 1.2 0.9 0.8   Creatinine, Urine mg/dL  --  40.1  --   --   --   --    Albumin g/dL 4.3  --  4.0  --   --   --    Total Bilirubin mg/dL 0.6   --  0.6  --   --   --    Alkaline Phosphatase U/L 87  --  106  --   --   --    AST U/L 32  --  31  --   --   --    ALT U/L 24  --  23  --   --   --    Magnesium mg/dL  --   --   --   --   --  1.4*       Microbiologic Results:  Microbiology Results (last 7 days)       Procedure Component Value Units Date/Time    Culture, Respiratory with Gram Stain [[id number]] Collected: 06/14/23 0659    Order Status: Sent Specimen: Respiratory from Endotracheal Aspirate Updated: 06/14/23 1257    Blood culture #1 **CANNOT BE ORDERED STAT** [[id number]] Collected: 06/14/23 0035    Order Status: Sent Specimen: Blood from Peripheral, Antecubital, Left Updated: 06/14/23 1206    Blood culture #2 **CANNOT BE ORDERED STAT** [[id number]] Collected: 06/14/23 0047    Order Status: Sent Specimen: Blood from Peripheral, Forearm, Left Updated: 06/14/23 1206    Culture, MRSA [[id number]] Collected: 06/14/23 0239    Order Status: Sent Specimen: MRSA source from Nares, Right Updated: 06/14/23 0242             Psychiatric:         Mood and Affect: Mood normal.         Behavior: Behavior normal.         Thought Content: Thought content normal.        BP (!) 148/77   Pulse 100   Ht 5' 7\" (1.702 m)   Wt 297 lb (134.7 kg)   BMI 46.52 kg/m²     IMPRESSION:  Infection of the left lower leg, likely cellulitis. No clear signs of abscess. She is improved with antibiotics. I recommend that she continue the Keflex as prescribed by the emergency department. She is on doxycycline currently for hidradenitis which she can continue. I will see her back next week for reevaluation. She will call me with any changes or worsening of symptoms. ASSESSMENT/PLAN:  David Flores was seen today for new patient. Diagnoses and all orders for this visit:    Cellulitis of left lower extremity      Continue antibiotics. Follow-up in 1 week.     Yvon Hunt MD

## 2023-06-14 NOTE — ASSESSMENT & PLAN NOTE
Required intubation for airway protection / respiratory arrest  abg with decompensating metabolic acidosis  Vent settings adjusted to help compensate  VAP prophylaxis  Serial abg until pH corrected

## 2023-06-14 NOTE — H&P
[facility] ([facility])  Critical Care Medicine  History & Physical    Patient Name: Shania Tapia  MRN: [id number]  Admission Date: 6/13/2023  Hospital Length of Stay: 0 days  Code Status: Full Code  Attending Physician: Jose Woody MD   Primary Care Provider: Flavia Fink DO   Principal Problem: Severe sepsis    Subjective:     HPI:  37 year old female with known medical history of HTN and anxiety    Presented to ED with complaint of blurred vision and color vision change along with difficulty finding words and unsteady gait  She endorsed having felt jittery for a couple days which she thought was anxiety until above changes after arriving to work that morning  ED initial work up for neuro etiology  CT head negative  MRI brain also with no acute finding  Labs revealed severe metabolic acidosis CO2 less than 5; ABG 7.065/9.8/108/2.8 on room air; d dimer 9.38  CTA chest shows dense RUL pneumonia    ED initiated IVF bolus; abx; bicarb IVP and repeat abg did not improve  Critical care team was contacted for admission for sepsis with severe metabolic acidosis and encephalopathy    On my arrival to ED to examine and admit; pt had just had episode of jerking in hands suspicious for seizure and was given small dose IV ativan after which she became unresponsive and required emergent intubation for airway support and mechanical ventilation.      Hospital/ICU Course:  No notes on file     Past Medical History:   Diagnosis Date    Abnormal Pap smear     Abnormal Pap smear of vagina     Anemia     Anxiety     Hypertension        Past Surgical History:   Procedure Laterality Date    CERVICAL BIOPSY      Conization        Review of patient's allergies indicates:   Allergen Reactions    Latex Rash    Latex, natural rubber Rash       Family History       Problem Relation (Age of Onset)    Diabetes Paternal Grandmother    Heart attack Father    Hypertension Father          Tobacco Use    Smoking  status: Never    Smokeless tobacco: Never   Substance and Sexual Activity    Alcohol use: No    Drug use: No    Sexual activity: Yes     Partners: Male     Birth control/protection: None         Review of Systems   Unable to perform ROS: Patient unresponsive   Objective:     Vital Signs (Most Recent):  Temp: 97.9 °F (36.6 °C) (06/14/23 0341)  Pulse: (!) 116 (06/14/23 0341)  Resp: (!) 32 (06/14/23 0341)  BP: 139/77 (06/14/23 0341)  SpO2: 100 % (06/14/23 0341) Vital Signs (24h Range):  Temp:  [97.9 °F (36.6 °C)-98.6 °F (37 °C)] 97.9 °F (36.6 °C)  Pulse:  [112-144] 116  Resp:  [18-32] 32  SpO2:  [91 %-100 %] 100 %  BP: (123-167)/() 139/77     Weight: 69.1 kg (152 lb 5.4 oz)  Body mass index is 28.78 kg/m².      Intake/Output Summary (Last 24 hours) at 6/14/2023 0409  Last data filed at 6/14/2023 0303  Gross per 24 hour   Intake 3099 ml   Output --   Net 3099 ml        Physical Exam  Vitals and nursing note reviewed.   Constitutional:       Appearance: She is ill-appearing.      Interventions: She is intubated.   HENT:      Head: Atraumatic.   Eyes:      Conjunctiva/sclera: Conjunctivae normal.      Comments: Pupils round, equal, and responsive to light   Neck:      Vascular: No JVD.   Cardiovascular:      Rate and Rhythm: Regular rhythm. Tachycardia present.      Pulses:           Radial pulses are 2+ on the right side and 2+ on the left side.        Dorsalis pedis pulses are 2+ on the right side and 2+ on the left side.   Pulmonary:      Effort: She is intubated.      Breath sounds: Decreased breath sounds present.   Abdominal:      General: There is no distension.      Palpations: Abdomen is soft.   Musculoskeletal:      Right lower leg: No edema.      Left lower leg: No edema.   Skin:     General: Skin is warm and dry.      Capillary Refill: Capillary refill takes less than 2 seconds.      Findings: No lesion or wound.        Vents:  Vent Mode: A/C (06/14/23 0339)  Set Rate: 26 BPM (06/14/23 0339)  Vt Set:  420 mL (06/14/23 0339)  PEEP/CPAP: 8 cmH20 (06/14/23 0339)  Oxygen Concentration (%): 60 (06/14/23 0344)  Peak Airway Pressure: 16 cmH20 (06/14/23 0339)  Plateau Pressure: 0 cmH20 (06/14/23 0339)  Total Ve: 13.6 L/m (06/14/23 0339)  Negative Inspiratory Force (cm H2O): 0 (06/14/23 0339)  F/VT Ratio<105 (RSBI): (!) 31.98 (06/14/23 0339)    Lines/Drains/Airways       Drain  Duration                  Urethral Catheter 06/14/23 0320 Non-latex;Silicone 16 Fr. <1 day              Airway  Duration                  Airway - Non-Surgical 06/14/23 0200 Endotracheal Tube <1 day              Peripheral Intravenous Line  Duration                  Peripheral IV - Double Lumen 06/14/23 0140 18 G;20 G Right Antecubital <1 day         Peripheral IV - Single Lumen 06/14/23 0149 20 G Left Antecubital <1 day                    Significant Labs:    CBC/Anemia Profile:  Recent Labs   Lab 06/13/23  1415   WBC 15.11*   HGB 9.9*   HCT 34.5*   *   MCV 75*   RDW 21.4*        Chemistries:  Recent Labs   Lab 06/13/23  1415 06/14/23  0046   * 140   K 3.1* 3.8    108   CO2 <5* <5*   BUN 8 7   CREATININE 1.1 0.9   CALCIUM 8.9 9.1   ALBUMIN 4.3 4.0   PROT 9.1* 8.8*   BILITOT 0.6 0.6   ALKPHOS 87 106   ALT 24 23   AST 32 31       Lactic Acid:   Recent Labs   Lab 06/13/23  2237 06/14/23  0159   LACTATE 2.3* 3.9*     Troponin:   Recent Labs   Lab 06/13/23  1415   TROPONINI <0.006     All pertinent labs within the past 24 hours have been reviewed.    Significant Imaging:   I have reviewed all pertinent imaging results/findings within the past 24 hours.    Assessment/Plan:     Neuro  Encephalopathy  Negative CT Head and MRI in ED  Suspect related to sepsis and no MRI enhancement but given severity, may need LP today for CSF studies to rule out meningitis/encephalitis  Broad spectrum abx and supportive care    Pulmonary  On mechanically assisted ventilation  Required intubation for airway protection / respiratory arrest  abg with  decompensating metabolic acidosis  Vent settings adjusted to help compensate  VAP prophylaxis  Serial abg until pH corrected    Pneumonia of right upper lobe due to infectious organism  No pseudomonas or MRSA risk factors found  Cover with cefepime and azithromycin with low threshold to escalate  Sputum sent from endotracheal aspirate for culture    Renal/  Metabolic acidosis  Severe  Likely s/t severe sepsis  No significant hyperglycemia; no LILIAM; salicylate and ethanol levels negative; no likely offending home medications; serum ketones 1.3  Will send methanol, polyethylene glycol levels for completeness; no suspicion by family of poison/toxin  Bicarb IVP; Bicarb infusion; vent compensation as able  Will repeat abg q2h  If worsens will consult nephrology for dialysis consideration    ID  * Severe sepsis  This patient does have evidence of infective focus  My overall impression is sepsis.  Source: Respiratory (confirmed with CT imaging) and Neurologic (Meningitis/Encephalitis less likely)  Antibiotics given-   Antibiotics (72h ago, onward)    Start     Stop Route Frequency Ordered    06/14/23 0930  ceFEPIme (MAXIPIME) 2 g in dextrose 5 % in water (D5W) 5 % 100 mL IVPB (MB+)         -- IV Every 8 hours (non-standard times) 06/14/23 0136    06/14/23 0900  mupirocin 2 % ointment  (DECOLONIZATION PROTOCOL ORDERS)         06/19 0859 Nasl 2 times daily 06/14/23 0122    06/14/23 0135  azithromycin (ZITHROMAX) 500 mg in dextrose 5 % (D5W) 250 mL IVPB (Vial-Mate)         06/21 0144 IV Every 24 hours (non-standard times) 06/14/23 0136        Latest lactate reviewed-  Recent Labs   Lab 06/13/23  2237 06/14/23  0159   LACTATE 2.3* 3.9*     Organ dysfunction indicated by Encephalopathy    Fluid challenge Not needed - patient is not hypotensive      Post- resuscitation assessment No - Post resuscitation assessment not needed       Will Not start Pressors- Levophed for MAP of 65  Source control achieved by: abx as above         Critical Care Daily Checklist:    A: Awake: RASS Goal/Actual Goal:    Actual:     B: Spontaneous Breathing Trial Performed?     C: SAT & SBT Coordinated?  Not candidate                      D: Delirium: CAM-ICU     E: Early Mobility Performed? Yes   F: Feeding Goal:    Status:     Current Diet Order   Procedures    Diet NPO      AS: Analgesia/Sedation fentanyl   T: Thromboembolic Prophylaxis lovenox   H: HOB > 300 Yes   U: Stress Ulcer Prophylaxis (if needed) pepcid   G: Glucose Control SSI prn   B: Bowel Function     I: Indwelling Catheter (Lines & Little) Necessity reviewed   D: De-escalation of Antimicrobials/Pharmacotherapies reviewed    Plan for the day/ETD As above    Code Status:  Family/Goals of Care: Full Code  Spouse Vladislav updated at bedside. He is SDM     Critical Care Time: 78 minutes  Critical secondary to severe metabolic acidosis, encephalopathy, and mechanical ventilation related to sepsis, pneumonia     Critical care was time spent personally by me on the following activities: development of treatment plan with patient or surrogate and bedside caregivers, discussions with consultants, evaluation of patient's response to treatment, examination of patient, ordering and performing treatments and interventions, ordering and review of laboratory studies, ordering and review of radiographic studies, pulse oximetry, re-evaluation of patient's condition. This critical care time did not overlap with that of any other provider or involve time for any procedures.     ANABELLA Shay-BC  Critical Care Medicine  [facility] ([facility])   patient

## 2023-06-14 NOTE — ASSESSMENT & PLAN NOTE
Severe  Likely s/t severe sepsis  No significant hyperglycemia; no LILIAM; salicylate and ethanol levels negative; no likely offending home medications; serum ketones 1.3  Will send methanol, polyethylene glycol levels for completeness; no suspicion by family of poison/toxin  Bicarb IVP; Bicarb infusion; vent compensation as able  Will repeat abg q2h  If worsens will consult nephrology for dialysis consideration

## 2023-06-15 PROBLEM — J96.01 ACUTE RESPIRATORY FAILURE WITH HYPOXIA: Status: ACTIVE | Noted: 2023-06-14

## 2023-06-15 PROBLEM — J98.4 PNEUMONITIS: Status: ACTIVE | Noted: 2023-06-14

## 2023-06-15 PROBLEM — R74.8 HIGH SERUM OSMOLAR GAP: Status: ACTIVE | Noted: 2023-06-15

## 2023-06-15 PROBLEM — R44.1 VISUAL HALLUCINATION: Status: ACTIVE | Noted: 2023-06-15

## 2023-06-15 LAB
ALBUMIN SERPL BCP-MCNC: 2.4 G/DL (ref 3.5–5.2)
ALLENS TEST: ABNORMAL
ALP SERPL-CCNC: 85 U/L (ref 55–135)
ALT SERPL W/O P-5'-P-CCNC: 14 U/L (ref 10–44)
ANION GAP SERPL CALC-SCNC: 13 MMOL/L (ref 8–16)
ANION GAP SERPL CALC-SCNC: 14 MMOL/L (ref 8–16)
ANISOCYTOSIS BLD QL SMEAR: SLIGHT
AST SERPL-CCNC: 28 U/L (ref 10–40)
BASOPHILS # BLD AUTO: 0.05 K/UL (ref 0–0.2)
BASOPHILS NFR BLD: 0.5 % (ref 0–1.9)
BILIRUB DIRECT SERPL-MCNC: 0.6 MG/DL (ref 0.1–0.3)
BILIRUB SERPL-MCNC: 1.4 MG/DL (ref 0.1–1)
BUN SERPL-MCNC: 4 MG/DL (ref 6–20)
BUN SERPL-MCNC: 7 MG/DL (ref 6–20)
CALCIUM SERPL-MCNC: 8.3 MG/DL (ref 8.7–10.5)
CALCIUM SERPL-MCNC: 8.7 MG/DL (ref 8.7–10.5)
CHLORIDE SERPL-SCNC: 94 MMOL/L (ref 95–110)
CHLORIDE SERPL-SCNC: 95 MMOL/L (ref 95–110)
CO2 SERPL-SCNC: 30 MMOL/L (ref 23–29)
CO2 SERPL-SCNC: 30 MMOL/L (ref 23–29)
CREAT SERPL-MCNC: 0.6 MG/DL (ref 0.5–1.4)
CREAT SERPL-MCNC: 0.8 MG/DL (ref 0.5–1.4)
DACRYOCYTES BLD QL SMEAR: ABNORMAL
DELSYS: ABNORMAL
DIFFERENTIAL METHOD: ABNORMAL
EOSINOPHIL # BLD AUTO: 0 K/UL (ref 0–0.5)
EOSINOPHIL NFR BLD: 0 % (ref 0–8)
ERYTHROCYTE [DISTWIDTH] IN BLOOD BY AUTOMATED COUNT: 20 % (ref 11.5–14.5)
ERYTHROCYTE [SEDIMENTATION RATE] IN BLOOD BY WESTERGREN METHOD: 12 MM/H
ERYTHROCYTE [SEDIMENTATION RATE] IN BLOOD BY WESTERGREN METHOD: 16 MM/H
ERYTHROCYTE [SEDIMENTATION RATE] IN BLOOD BY WESTERGREN METHOD: 24 MM/H
ERYTHROCYTE [SEDIMENTATION RATE] IN BLOOD BY WESTERGREN METHOD: 32 MM/H
EST. GFR  (NO RACE VARIABLE): >60 ML/MIN/1.73 M^2
EST. GFR  (NO RACE VARIABLE): >60 ML/MIN/1.73 M^2
FIO2: 100
FIO2: 40
FIO2: 50
FIO2: 50
GLUCOSE SERPL-MCNC: 105 MG/DL (ref 70–110)
GLUCOSE SERPL-MCNC: 98 MG/DL (ref 70–110)
HCO3 UR-SCNC: 36.6 MMOL/L (ref 24–28)
HCO3 UR-SCNC: 39 MMOL/L (ref 24–28)
HCO3 UR-SCNC: 39.2 MMOL/L (ref 24–28)
HCO3 UR-SCNC: 41.3 MMOL/L (ref 24–28)
HCT VFR BLD AUTO: 27.6 % (ref 37–48.5)
HGB BLD-MCNC: 8.4 G/DL (ref 12–16)
IMM GRANULOCYTES # BLD AUTO: 0.17 K/UL (ref 0–0.04)
IMM GRANULOCYTES NFR BLD AUTO: 1.6 % (ref 0–0.5)
LACTATE SERPL-SCNC: 1.3 MMOL/L (ref 0.5–2.2)
LACTATE SERPL-SCNC: 1.8 MMOL/L (ref 0.5–2.2)
LACTATE SERPL-SCNC: 1.9 MMOL/L (ref 0.5–2.2)
LYMPHOCYTES # BLD AUTO: 0.9 K/UL (ref 1–4.8)
LYMPHOCYTES NFR BLD: 8 % (ref 18–48)
MAGNESIUM SERPL-MCNC: 1.5 MG/DL (ref 1.6–2.6)
MCH RBC QN AUTO: 21.3 PG (ref 27–31)
MCHC RBC AUTO-ENTMCNC: 30.4 G/DL (ref 32–36)
MCV RBC AUTO: 70 FL (ref 82–98)
MODE: ABNORMAL
MONOCYTES # BLD AUTO: 0.8 K/UL (ref 0.3–1)
MONOCYTES NFR BLD: 7.1 % (ref 4–15)
NEUTROPHILS # BLD AUTO: 8.8 K/UL (ref 1.8–7.7)
NEUTROPHILS NFR BLD: 82.8 % (ref 38–73)
NRBC BLD-RTO: 0 /100 WBC
PCO2 BLDA: 27 MMHG (ref 35–45)
PCO2 BLDA: 35.8 MMHG (ref 35–45)
PCO2 BLDA: 38.9 MMHG (ref 35–45)
PCO2 BLDA: 53.7 MMHG (ref 35–45)
PEEP: 10
PEEP: 7
PEEP: 8
PEEP: 8
PH SMN: 7.47 [PH] (ref 7.35–7.45)
PH SMN: 7.61 [PH] (ref 7.35–7.45)
PH SMN: 7.67 [PH] (ref 7.35–7.45)
PH SMN: 7.74 [PH] (ref 7.35–7.45)
PHOSPHATE SERPL-MCNC: <1 MG/DL (ref 2.7–4.5)
PLATELET # BLD AUTO: 104 K/UL (ref 150–450)
PLATELET BLD QL SMEAR: ABNORMAL
PMV BLD AUTO: ABNORMAL FL (ref 9.2–12.9)
PO2 BLDA: 111 MMHG (ref 80–100)
PO2 BLDA: 177 MMHG (ref 80–100)
PO2 BLDA: 424 MMHG (ref 80–100)
PO2 BLDA: 45 MMHG (ref 80–100)
POC BE: 15 MMOL/L
POC BE: 17 MMOL/L
POC BE: 18 MMOL/L
POC BE: 21 MMOL/L
POC SATURATED O2: 100 % (ref 95–100)
POC SATURATED O2: 100 % (ref 95–100)
POC SATURATED O2: 92 % (ref 95–100)
POC SATURATED O2: 99 % (ref 95–100)
POCT GLUCOSE: 101 MG/DL (ref 70–110)
POCT GLUCOSE: 128 MG/DL (ref 70–110)
POCT GLUCOSE: 133 MG/DL (ref 70–110)
POCT GLUCOSE: 99 MG/DL (ref 70–110)
POTASSIUM SERPL-SCNC: 2.8 MMOL/L (ref 3.5–5.1)
POTASSIUM SERPL-SCNC: 3.3 MMOL/L (ref 3.5–5.1)
PROCALCITONIN SERPL IA-MCNC: 5.94 NG/ML
PROT SERPL-MCNC: 5.9 G/DL (ref 6–8.4)
RBC # BLD AUTO: 3.95 M/UL (ref 4–5.4)
SAMPLE: ABNORMAL
SITE: ABNORMAL
SODIUM SERPL-SCNC: 138 MMOL/L (ref 136–145)
SODIUM SERPL-SCNC: 138 MMOL/L (ref 136–145)
SP02: 99
VT: 350
VT: 380
WBC # BLD AUTO: 10.62 K/UL (ref 3.9–12.7)

## 2023-06-15 PROCEDURE — 25000003 PHARM REV CODE 250: Performed by: INTERNAL MEDICINE

## 2023-06-15 PROCEDURE — 25000003 PHARM REV CODE 250: Performed by: NURSE PRACTITIONER

## 2023-06-15 PROCEDURE — 82803 BLOOD GASES ANY COMBINATION: CPT

## 2023-06-15 PROCEDURE — 94761 N-INVAS EAR/PLS OXIMETRY MLT: CPT

## 2023-06-15 PROCEDURE — 80048 BASIC METABOLIC PNL TOTAL CA: CPT | Mod: 91 | Performed by: INTERNAL MEDICINE

## 2023-06-15 PROCEDURE — 80076 HEPATIC FUNCTION PANEL: CPT | Performed by: NURSE PRACTITIONER

## 2023-06-15 PROCEDURE — 80100014 HC HEMODIALYSIS 1:1

## 2023-06-15 PROCEDURE — 63600175 PHARM REV CODE 636 W HCPCS: Performed by: INTERNAL MEDICINE

## 2023-06-15 PROCEDURE — 25000003 PHARM REV CODE 250

## 2023-06-15 PROCEDURE — 63600175 PHARM REV CODE 636 W HCPCS: Performed by: NURSE PRACTITIONER

## 2023-06-15 PROCEDURE — 99900026 HC AIRWAY MAINTENANCE (STAT)

## 2023-06-15 PROCEDURE — 83605 ASSAY OF LACTIC ACID: CPT | Performed by: INTERNAL MEDICINE

## 2023-06-15 PROCEDURE — 99900035 HC TECH TIME PER 15 MIN (STAT)

## 2023-06-15 PROCEDURE — 85025 COMPLETE CBC W/AUTO DIFF WBC: CPT | Performed by: NURSE PRACTITIONER

## 2023-06-15 PROCEDURE — 84145 PROCALCITONIN (PCT): CPT | Performed by: NURSE PRACTITIONER

## 2023-06-15 PROCEDURE — 99291 PR CRITICAL CARE, E/M 30-74 MINUTES: ICD-10-PCS | Mod: ,,, | Performed by: INTERNAL MEDICINE

## 2023-06-15 PROCEDURE — 83735 ASSAY OF MAGNESIUM: CPT | Performed by: NURSE PRACTITIONER

## 2023-06-15 PROCEDURE — 80048 BASIC METABOLIC PNL TOTAL CA: CPT | Performed by: INTERNAL MEDICINE

## 2023-06-15 PROCEDURE — 27000221 HC OXYGEN, UP TO 24 HOURS

## 2023-06-15 PROCEDURE — 83605 ASSAY OF LACTIC ACID: CPT | Mod: 91 | Performed by: INTERNAL MEDICINE

## 2023-06-15 PROCEDURE — 21400001 HC TELEMETRY ROOM

## 2023-06-15 PROCEDURE — 27100171 HC OXYGEN HIGH FLOW UP TO 24 HOURS

## 2023-06-15 PROCEDURE — 84100 ASSAY OF PHOSPHORUS: CPT | Performed by: NURSE PRACTITIONER

## 2023-06-15 PROCEDURE — 99291 CRITICAL CARE FIRST HOUR: CPT | Mod: ,,, | Performed by: INTERNAL MEDICINE

## 2023-06-15 PROCEDURE — 37799 UNLISTED PX VASCULAR SURGERY: CPT

## 2023-06-15 RX ORDER — PHENYLEPHRINE HCL IN 0.9% NACL 1 MG/10 ML
SYRINGE (ML) INTRAVENOUS
Status: COMPLETED
Start: 2023-06-15 | End: 2023-06-15

## 2023-06-15 RX ORDER — PHENYLEPHRINE HCL IN 0.9% NACL 20MG/250ML
0-5 PLASTIC BAG, INJECTION (ML) INTRAVENOUS CONTINUOUS
Status: DISCONTINUED | OUTPATIENT
Start: 2023-06-15 | End: 2023-06-15

## 2023-06-15 RX ORDER — PHENYLEPHRINE HCL IN 0.9% NACL 1 MG/10 ML
200 SYRINGE (ML) INTRAVENOUS ONCE
Status: COMPLETED | OUTPATIENT
Start: 2023-06-15 | End: 2023-06-15

## 2023-06-15 RX ORDER — POTASSIUM CHLORIDE 14.9 MG/ML
60 INJECTION INTRAVENOUS
Status: DISCONTINUED | OUTPATIENT
Start: 2023-06-15 | End: 2023-06-17

## 2023-06-15 RX ORDER — POTASSIUM CHLORIDE 29.8 MG/ML
40 INJECTION INTRAVENOUS ONCE
Status: COMPLETED | OUTPATIENT
Start: 2023-06-15 | End: 2023-06-15

## 2023-06-15 RX ORDER — SODIUM CHLORIDE, SODIUM LACTATE, POTASSIUM CHLORIDE, CALCIUM CHLORIDE 600; 310; 30; 20 MG/100ML; MG/100ML; MG/100ML; MG/100ML
INJECTION, SOLUTION INTRAVENOUS CONTINUOUS
Status: DISCONTINUED | OUTPATIENT
Start: 2023-06-15 | End: 2023-06-16

## 2023-06-15 RX ORDER — METOPROLOL TARTRATE 1 MG/ML
2.5 INJECTION, SOLUTION INTRAVENOUS ONCE
Status: COMPLETED | OUTPATIENT
Start: 2023-06-15 | End: 2023-06-15

## 2023-06-15 RX ORDER — ACETAMINOPHEN 650 MG/20.3ML
650 LIQUID ORAL EVERY 6 HOURS PRN
Status: DISCONTINUED | OUTPATIENT
Start: 2023-06-15 | End: 2023-06-16

## 2023-06-15 RX ORDER — METOPROLOL TARTRATE 25 MG/1
25 TABLET, FILM COATED ORAL 2 TIMES DAILY
Status: DISCONTINUED | OUTPATIENT
Start: 2023-06-15 | End: 2023-06-20

## 2023-06-15 RX ORDER — MAGNESIUM SULFATE HEPTAHYDRATE 40 MG/ML
2 INJECTION, SOLUTION INTRAVENOUS
Status: DISCONTINUED | OUTPATIENT
Start: 2023-06-15 | End: 2023-06-17

## 2023-06-15 RX ORDER — POTASSIUM CHLORIDE 29.8 MG/ML
40 INJECTION INTRAVENOUS
Status: DISCONTINUED | OUTPATIENT
Start: 2023-06-15 | End: 2023-06-17

## 2023-06-15 RX ORDER — POTASSIUM CHLORIDE 29.8 MG/ML
80 INJECTION INTRAVENOUS
Status: DISCONTINUED | OUTPATIENT
Start: 2023-06-15 | End: 2023-06-17

## 2023-06-15 RX ORDER — ACETAMINOPHEN 650 MG/20.3ML
650 LIQUID ORAL EVERY 6 HOURS PRN
Status: DISCONTINUED | OUTPATIENT
Start: 2023-06-15 | End: 2023-06-15

## 2023-06-15 RX ORDER — MAGNESIUM SULFATE HEPTAHYDRATE 40 MG/ML
4 INJECTION, SOLUTION INTRAVENOUS
Status: DISCONTINUED | OUTPATIENT
Start: 2023-06-15 | End: 2023-06-17

## 2023-06-15 RX ADMIN — PROPOFOL 35 MCG/KG/MIN: 10 INJECTION, EMULSION INTRAVENOUS at 09:06

## 2023-06-15 RX ADMIN — THIAMINE HYDROCHLORIDE 500 MG: 100 INJECTION, SOLUTION INTRAMUSCULAR; INTRAVENOUS at 06:06

## 2023-06-15 RX ADMIN — CEFTRIAXONE 1 G: 1 INJECTION, POWDER, FOR SOLUTION INTRAMUSCULAR; INTRAVENOUS at 04:06

## 2023-06-15 RX ADMIN — METOPROLOL TARTRATE 25 MG: 25 TABLET, FILM COATED ORAL at 02:06

## 2023-06-15 RX ADMIN — MAGNESIUM SULFATE HEPTAHYDRATE 2 G: 40 INJECTION, SOLUTION INTRAVENOUS at 09:06

## 2023-06-15 RX ADMIN — FOMEPIZOLE 690 MG: 1 INJECTION, SOLUTION INTRAVENOUS at 02:06

## 2023-06-15 RX ADMIN — METOPROLOL TARTRATE 2.5 MG: 1 INJECTION, SOLUTION INTRAVENOUS at 02:06

## 2023-06-15 RX ADMIN — AZITHROMYCIN MONOHYDRATE 500 MG: 500 INJECTION, POWDER, LYOPHILIZED, FOR SOLUTION INTRAVENOUS at 01:06

## 2023-06-15 RX ADMIN — FAMOTIDINE 20 MG: 40 POWDER, FOR SUSPENSION ORAL at 09:06

## 2023-06-15 RX ADMIN — Medication 0.5 MCG/KG/MIN: at 04:06

## 2023-06-15 RX ADMIN — MUPIROCIN: 20 OINTMENT TOPICAL at 09:06

## 2023-06-15 RX ADMIN — POTASSIUM BICARBONATE 40 MEQ: 391 TABLET, EFFERVESCENT ORAL at 03:06

## 2023-06-15 RX ADMIN — SENNOSIDES AND DOCUSATE SODIUM 2 TABLET: 50; 8.6 TABLET ORAL at 09:06

## 2023-06-15 RX ADMIN — THIAMINE HYDROCHLORIDE 500 MG: 100 INJECTION, SOLUTION INTRAMUSCULAR; INTRAVENOUS at 11:06

## 2023-06-15 RX ADMIN — POTASSIUM CHLORIDE 40 MEQ: 29.8 INJECTION, SOLUTION INTRAVENOUS at 03:06

## 2023-06-15 RX ADMIN — VANCOMYCIN HYDROCHLORIDE 1000 MG: 1 INJECTION, POWDER, LYOPHILIZED, FOR SOLUTION INTRAVENOUS at 09:06

## 2023-06-15 RX ADMIN — Medication 200 MCG: at 04:06

## 2023-06-15 RX ADMIN — SODIUM CHLORIDE, POTASSIUM CHLORIDE, SODIUM LACTATE AND CALCIUM CHLORIDE: 600; 310; 30; 20 INJECTION, SOLUTION INTRAVENOUS at 02:06

## 2023-06-15 RX ADMIN — METOPROLOL TARTRATE 25 MG: 25 TABLET, FILM COATED ORAL at 09:06

## 2023-06-15 RX ADMIN — CEFEPIME 2 G: 2 INJECTION, POWDER, FOR SOLUTION INTRAVENOUS at 09:06

## 2023-06-15 RX ADMIN — ACETAMINOPHEN 650 MG: 650 SOLUTION ORAL at 03:06

## 2023-06-15 RX ADMIN — PROPOFOL 40 MCG/KG/MIN: 10 INJECTION, EMULSION INTRAVENOUS at 04:06

## 2023-06-15 RX ADMIN — POTASSIUM PHOSPHATE, MONOBASIC AND POTASSIUM PHOSPHATE, DIBASIC 30 MMOL: 224; 236 INJECTION, SOLUTION, CONCENTRATE INTRAVENOUS at 11:06

## 2023-06-15 RX ADMIN — MULTIVITAMIN 15 ML: LIQUID ORAL at 09:06

## 2023-06-15 RX ADMIN — CEFEPIME 2 G: 2 INJECTION, POWDER, FOR SOLUTION INTRAVENOUS at 12:06

## 2023-06-15 RX ADMIN — FOLIC ACID 5 MG: 5 INJECTION, SOLUTION INTRAMUSCULAR; INTRAVENOUS; SUBCUTANEOUS at 12:06

## 2023-06-15 RX ADMIN — ENOXAPARIN SODIUM 40 MG: 40 INJECTION SUBCUTANEOUS at 04:06

## 2023-06-15 RX ADMIN — FOLIC ACID 5 MG: 5 INJECTION, SOLUTION INTRAMUSCULAR; INTRAVENOUS; SUBCUTANEOUS at 05:06

## 2023-06-15 RX ADMIN — CHLORHEXIDINE GLUCONATE 0.12% ORAL RINSE 15 ML: 1.2 LIQUID ORAL at 09:06

## 2023-06-15 NOTE — ASSESSMENT & PLAN NOTE
Required intubation for airway protection / respiratory arrest  Adjust vent as needed  VAP prophylaxis

## 2023-06-15 NOTE — CHAPLAIN
Initial visit with patient and her mother.  Pt was unable to visit but I did speak with her mother.  Pt's mother was struggling and wanted me to pray for them.  I took time to do this for them before leaving and spiritual care remains available as needed.    Chaplain Neil Pearl M.Div., [facility]

## 2023-06-15 NOTE — ASSESSMENT & PLAN NOTE
Negative CT Head and MRI in ED  Suspect related to sepsis and or toxicologic etiologies  Broad spectrum abx and supportive care

## 2023-06-15 NOTE — PROGRESS NOTES
"Patient extubated earlier today and tolerating nasal cannula.    She denies known toxin ingestion or any self harm gesture.  However, she notes she is seeing "flowers" and "spiders" at times.  She states she knows they're not real.    Will consult telepsych.  Continue Fomepizole for a total of 4 doses.  Abx narrowed to CTX, azithro.    "

## 2023-06-15 NOTE — PROGRESS NOTES
[facility] ([facility])  Nephrology  Progress Note    Patient Name: Shania Tapia  MRN: [id number]  Admission Date: 6/13/2023  Hospital Length of Stay: 1 days  Attending Provider: Jose Woody MD   Primary Care Physician: Flavia Fink DO  Principal Problem:High anion gap metabolic acidosis    Subjective:     HPI: Thank you for referring the pt to us. H/o and chart were reviewed. Pt was seen and examined. Pt is a 38 y/o female who presented initially for vague symptoms related to weakness. Pt had tremors, jitteriness, anxiety, and reported vision changes specially with colors, reported vision deficit or loss (unsure) and had profound metabolic acidosis (PH 7.0). Pt was intubated for respiratory distress. H/o was obtained form her  and mother. Pt has a h/o of HTN and anxiety. She has experienced intentional wt loss about 20 lbs reported by her  in the past 2 months. She does not eat well. 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 Pt has not been seen wearing a mask there. Per family, she does not drink ethanol alcohol. Renal service was consulted for severe metabolic acidosis and osmolal gap.      Interval History: Pt was seen and examined in ICU. Labs and meds reviewed. Discussed with ICU. S/p acute and urgent HD last pm for severe metabolic acidosis. No new events, remained intubated overnight, was just extubated a few minutes ago. Denies any immediate discomfort. Denies any toxin intake at work or home.    Review of patient's allergies indicates:   Allergen Reactions    Latex Rash    Latex, natural rubber Rash     Current Facility-Administered Medications   Medication Frequency    acetaminophen oral solution 650 mg Q6H PRN    azithromycin (ZITHROMAX) 500 mg in dextrose 5 % (D5W) 250 mL IVPB (Vial-Mate) Q24H    ceFEPIme (MAXIPIME) 2 g in dextrose 5 % in water (D5W) 5 % 100 mL IVPB (MB+) Q8H    chlorhexidine 0.12 %  solution 15 mL BID    dextrose 10% bolus 125 mL 125 mL PRN    dextrose 10% bolus 250 mL 250 mL PRN    enoxaparin injection 40 mg Daily    famotidine 40 mg/5 mL (8 mg/mL) suspension 20 mg BID    fentaNYL 2500 mcg in 0.9% sodium chloride 250 mL infusion premix (titrating) Continuous    folic acid 5 mg in dextrose 5 % (D5W) 250 mL IVPB Q6H    fomepizole (ANTIZOL) 690 mg in dextrose 5 % (D5W) 100 mL IVPB Q12H    glucagon (human recombinant) injection 1 mg PRN    insulin aspart U-100 pen 1-10 Units Q6H PRN    magnesium sulfate 2g in water 50mL IVPB (premix) PRN    magnesium sulfate 2g in water 50mL IVPB (premix) PRN    multivit-min-ferrous gluconate 9 mg iron/15 mL oral liquid 15 mL Daily    mupirocin 2 % ointment BID    ondansetron injection 4 mg Q8H PRN    PHENYLephrine (MARCELLO-SYNEPHRINE) 20 mg in sodium chloride 0.9% 250ml (titrating) Continuous    potassium chloride 40 mEq in 100 mL IVPB (FOR CENTRAL LINE ADMINISTRATION ONLY) PRN    And    potassium chloride 20 mEq in 100 mL IVPB (FOR CENTRAL LINE ADMINISTRATION ONLY) PRN    And    potassium chloride 40 mEq in 100 mL IVPB (FOR CENTRAL LINE ADMINISTRATION ONLY) PRN    potassium phosphate 30 mmol in dextrose 5 % (D5W) 500 mL infusion Once    propofol (DIPRIVAN) 10 mg/mL infusion Continuous    senna-docusate 8.6-50 mg per tablet 2 tablet BID    thiamine (B-1) 500 mg in dextrose 5 % (D5W) 100 mL IVPB Q8H    Followed by    [START ON 6/16/2023] thiamine (B-1) 100 mg in dextrose 5 % (D5W) 100 mL IVPB Daily    vancomycin (VANCOCIN) 1,000 mg in dextrose 5 % (D5W) 250 mL IVPB (Vial-Mate) Q12H    vancomycin - pharmacy to dose pharmacy to manage frequency       Objective:     Vital Signs (Most Recent):  Temp: 99.2 °F (37.3 °C) (06/15/23 0715)  Pulse: (!) 127 (06/15/23 1237)  Resp: (!) 24 (06/15/23 1237)  BP: (!) 93/56 (06/15/23 1100)  SpO2: (!) 92 % (06/15/23 1237) Vital Signs (24h Range):  Temp:  [98.3 °F (36.8 °C)-101.1 °F (38.4 °C)] 99.2 °F (37.3  °C)  Pulse:  [102-150] 127  Resp:  [10-34] 24  SpO2:  [87 %-100 %] 92 %  BP: ()/(52-73) 93/56  Arterial Line BP: ()/() 111/67     Weight: 69.1 kg (152 lb 5.4 oz) (06/14/23 1017)  Body mass index is 28.78 kg/m².  Body surface area is 1.72 meters squared.    I/O last 3 completed shifts:  In: 8747.1 [I.V.:4652.4; IV Piggyback:4094.7]  Out: 5880 [Urine:4880; Other:1000]     Physical Exam  Vitals and nursing note reviewed.   Constitutional:       Appearance: Normal appearance.   Cardiovascular:      Rate and Rhythm: Normal rate and regular rhythm.      Pulses: Normal pulses.      Heart sounds: Normal heart sounds.   Pulmonary:      Breath sounds: No rales.   Abdominal:      Palpations: Abdomen is soft.   Musculoskeletal:      Right lower leg: No edema.      Left lower leg: No edema.   Neurological:      Mental Status: She is oriented to person, place, and time.   Psychiatric:         Behavior: Behavior normal.        Significant Labs: reviewed  BMP  Lab Results   Component Value Date     06/15/2023    K 3.3 (L) 06/15/2023    CL 95 06/15/2023    CO2 30 (H) 06/15/2023    BUN 7 06/15/2023    CREATININE 0.8 06/15/2023    CALCIUM 8.3 (L) 06/15/2023    ANIONGAP 13 06/15/2023    EGFRNORACEVR >60 06/15/2023     Lab Results   Component Value Date    WBC 10.62 06/15/2023    HGB 8.4 (L) 06/15/2023    HCT 27.6 (L) 06/15/2023    MCV 70 (L) 06/15/2023     (L) 06/15/2023     Methanol and ethylene glycol pending    ABG  Recent Labs   Lab 06/15/23  0910   PH 7.469*   PO2 111*   PCO2 53.7*   HCO3 39.0*   BE 15       Significant Imaging:  reviewed CXR    Assessment/Plan:     38 y/o female with profound metabolic acidosis and possibly toxin exposure:     Metabolic acidosis  Profound acidemia (PH 7.0)  S/p acute HD last pm. Acidemia resolved with HD. Pt was actually alkalemic  (PH 7.7) post HD which immediately responded to induced hypoventilation while intubated. Repeat PH satisfactory at present.    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 The h/o of vision changes is alarming  DDx: toluene toxicity (through inhalation). Present with non-AG metabolic acidosis and hypokalemia. Included pneumonitis. When chronic, can present with AG metabolic acidosis     Recommend:  Monitor closely  No further indications for acute HD at the moment  Pending methanol, ethylene glycol,   Attempted to alos order diethylene glycol (sterno) and toluene, not available in epic orders     On mechanically assisted ventilation  Respiratory failure. Extubated today. At the present time, stable hemodynamically, doing OK clinically  RUL consolidation  Infectious? Or secondary to inhalation of a substance (pneumonitis? Would have been more diffuse pattern)     Encephalopathy  Reviewed head studies.  Any cerebral or neurological damage needs to be assessed     Plans and recommendations:  As detailed above  Total critical care time spent 40 minutes including time needed to review the records, the   patient evaluation, documentation, face-to-face discussion with the patient's family  more than 50% of the time was spent on coordination of care and counseling.             Chika Arcos MD  Nephrology  [facility] ([facility])

## 2023-06-15 NOTE — PLAN OF CARE
Plan of care reviewed with pt and pt's spouse and mom at bedside.  BP and HR slightly elevated but responsive to metoprolol added.  Pt extubated to NC o2 this shift.  IV fluids, abx, and electrolyte replacements as appropriate.  Little cath remains in use.  Art line d/c.  Pt with visual hallucinations, psych consult pending.  Downgraded to med/tele, awaiting bed for transfer.

## 2023-06-15 NOTE — PROGRESS NOTES
06/14/23 2245   Required for all Hemodialysis Patients   Hepatitis Status other (see comments)   Handoff Report   Received From Villa Monten[place]ro RN   Given To Fabienne Medrano RN   Treatment Type   Treatment Type Acute   Vital Signs   Temp 99.1 °F (37.3 °C)   Temp Source Axillary   Pulse (!) 120   Heart Rate Source Monitor   Resp (!) 32   SpO2 99 %   Pulse Oximetry Type Continuous   Oximetry Probe Site Intact   Oxygen Concentration (%) 50   Device (Oxygen Therapy) ventilator   Art Line   Arterial Line /74   Arterial Line MAP (mmHg) 91 mmHg   Post-Hemodialysis Assessment   Rinseback Volume (mL) 250 mL   Blood Volume Processed (Liters) 95.9 L   Dialyzer Clearance Lightly streaked   Duration of Treatment 240 minutes   Additional Fluid Intake (mL) 0 mL   Total UF (mL) 1000 mL   Net Fluid Removal 500   Patient Response to Treatment tolerated   Post-Hemodialysis Comments 4 hour new start completed as planned. NET UF goal reached. blood reperfused and pt de accessed according to P&P. RTF with primary ICU RN.

## 2023-06-15 NOTE — ASSESSMENT & PLAN NOTE
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 The h/o of vision changes is alarming  DDx: toluene toxicity (through inhalation). Present with non-AG metabolic acidosis and hypokalemia. Included pneumonitis. When chronic, can present with AG metabolic acidosis     Pt needs urgent and acute HD  Discussed with pt  Conveyed to them our concerns  Pt received multiple visits and evaluations in ICU  Will send methanol, ethylene glycol, and diethylene glycol (sterno)  Attempted to send toluene, not available in epic     On mechanically assisted ventilation  Respiratory failure  RUL consolidation  Infectious? Or secondary to inhalation of a substance (pneumonitis?)     Encephalopathy  Reviewed head studies.  Will discuss with ICU     Plans and recommendations:  As detailed above  Total critical care time spent 70 minutes including time needed to review the records, the   patient evaluation, documentation, face-to-face discussion with the patient's family  more than 50% of the time was spent on coordination of care and counseling.    Level V visit.  Multiple medical issues were addressed, as documented. Medical care provided was in addition to providing dialysis. Pt received multiple visits and evaluations.

## 2023-06-15 NOTE — ASSESSMENT & PLAN NOTE
This patient does have evidence of infective focus  My overall impression is sepsis.  Source: Respiratory (confirmed with CT imaging) and Neurologic (Meningitis/Encephalitis less likely)  Antibiotics given-   Antibiotics (72h ago, onward)    Start     Stop Route Frequency Ordered    06/14/23 2130  vancomycin (VANCOCIN) 1,000 mg in dextrose 5 % (D5W) 250 mL IVPB (Vial-Mate)         -- IV Every 12 hours (non-standard times) 06/14/23 1545    06/14/23 0930  ceFEPIme (MAXIPIME) 2 g in dextrose 5 % in water (D5W) 5 % 100 mL IVPB (MB+)         -- IV Every 8 hours (non-standard times) 06/14/23 0136    06/14/23 0900  mupirocin 2 % ointment  (DECOLONIZATION PROTOCOL ORDERS)         06/19 0859 Nasl 2 times daily 06/14/23 0122    06/14/23 0758  vancomycin - pharmacy to dose  (vancomycin IVPB)        See Hyperspace for full Linked Orders Report.    -- IV pharmacy to manage frequency 06/14/23 0658    06/14/23 0135  azithromycin (ZITHROMAX) 500 mg in dextrose 5 % (D5W) 250 mL IVPB (Vial-Mate)         06/17 0144 IV Every 24 hours (non-standard times) 06/14/23 0136        Latest lactate reviewed-  Recent Labs   Lab 06/15/23  0032 06/15/23  0543 06/15/23  0814   LACTATE 1.9 1.8 1.3     Organ dysfunction indicated by Encephalopathy    Fluid challenge Not needed - patient is not hypotensive      Post- resuscitation assessment No - Post resuscitation assessment not needed       Will Not start Pressors- Levophed for MAP of 65  Source control achieved by: abx as above

## 2023-06-15 NOTE — PROGRESS NOTES
Therapy with vancomycin complete and/or consult discontinued by provider.  Pharmacy will sign off, please re-consult as needed.    Thank you for allowing us to participate in this patient's care.   Katherine McArdle, Pharm.D. 6/15/2023 2:19 PM

## 2023-06-15 NOTE — HPI
"Shania Tapia, a 37-year-old female with a history of Anxiety and Hypertension, was admitted to the ICU on 6/13/2023 following an emergency department presentation for blurry vision, altered color vision, word-finding difficulties, unsteady gait, and jitteriness, which she initially associated with her anxiety. Subsequently, she experienced suspected seizure activity and required emergent intubation for airway protection.    Initial workup demonstrated severe high anion gap metabolic acidosis and a dense right upper lobe pneumonia. Suspecting possible toxic alcohol poisoning, specifically methanol (given her vision changes), she was started on fomepizole (last dose on 06/16) and bicarbonate infusion. Renal team initiated emergency hemodialysis (06/14) , and empiric antibiotic therapy was initiated for pneumonia.    After acidosis resolution, bicarbonate infusion was discontinued, and she was successfully extubated on 6/15. 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     Psychiatric consultation deemed her not to meet the criteria for a PEC, although escitalopram was suggested to manage her anxiety. Collateral information from her  did not suggest any suicidal ideation or attempts. He noted, however, significant job-related stress and anxiety in recent months, including stress-induced tremors. She had used various over-the-counter supplements and borrowed prescription anti-anxiety medications from her family members, albeit in a non-seeking manner, for self-management.  The patient was deemed stable for transfer out of the ICU on 6/16/2023.  "

## 2023-06-15 NOTE — SUBJECTIVE & OBJECTIVE
Interval History:   Unable to obtain interval history or ROS from patient due to intubation and sedation.    Objective:     Vital Signs (Most Recent):  Temp: 99.2 °F (37.3 °C) (06/15/23 0715)  Pulse: 110 (06/15/23 1000)  Resp: 12 (06/15/23 1000)  BP: 98/61 (06/15/23 1000)  SpO2: 98 % (06/15/23 1000) Vital Signs (24h Range):  Temp:  [98.3 °F (36.8 °C)-101.1 °F (38.4 °C)] 99.2 °F (37.3 °C)  Pulse:  [107-150] 110  Resp:  [12-35] 12  SpO2:  [87 %-100 %] 98 %  BP: ()/(52-73) 98/61  Arterial Line BP: ()/() 111/66     Weight: 69.1 kg (152 lb 5.4 oz)  Body mass index is 28.78 kg/m².      Intake/Output Summary (Last 24 hours) at 6/15/2023 1037  Last data filed at 6/15/2023 1000  Gross per 24 hour   Intake 6097.02 ml   Output 2895 ml   Net 3202.02 ml        Physical Exam  Vitals reviewed.   Constitutional:       Appearance: She is ill-appearing and toxic-appearing.      Interventions: She is sedated and intubated.   HENT:      Head: Normocephalic and atraumatic.      Nose: Nose normal.   Eyes:      Pupils: Pupils are equal, round, and reactive to light.   Cardiovascular:      Rate and Rhythm: Normal rate and regular rhythm.   Pulmonary:      Effort: Pulmonary effort is normal. No respiratory distress. She is intubated.      Comments: Symmetric chest rise.  No distress or dyssynchrony on the ventilator.  Abdominal:      General: Abdomen is flat. There is no distension.      Palpations: Abdomen is soft.   Musculoskeletal:         General: No deformity or signs of injury.      Right lower leg: No edema.      Left lower leg: No edema.   Skin:     General: Skin is warm and dry.   Neurological:      Comments: Sedated.    Does not open eyes to stimulation or voice.    Does not follow commands.             Vents:  Vent Mode: A/C (06/15/23 0907)  Set Rate: 12 BPM (06/15/23 0907)  Vt Set: 350 mL (06/15/23 0907)  PEEP/CPAP: 7 cmH20 (06/15/23 0907)  Oxygen Concentration (%): 40 (06/15/23 1000)  Peak Airway Pressure: 18  cmH20 (06/15/23 0907)  Plateau Pressure: 20 cmH20 (06/15/23 0907)  Total Ve: 4.39 L/m (06/15/23 0907)  Negative Inspiratory Force (cm H2O): 0 (06/15/23 0907)  F/VT Ratio<105 (RSBI): (!) 32.79 (06/15/23 0907)    Lines/Drains/Airways       Central Venous Catheter Line  Duration             Trialysis (Dialysis) Catheter 06/14/23 1801 right internal jugular <1 day              Drain  Duration                  Urethral Catheter 06/14/23 0320 Non-latex;Silicone 16 Fr. 1 day         NG/OG Tube 06/15/23 0700 Center mouth <1 day              Airway  Duration                  Airway - Non-Surgical 06/14/23 0200 Endotracheal Tube 1 day              Arterial Line  Duration             Arterial Line 06/14/23 1030 Right Radial 1 day              Peripheral Intravenous Line  Duration                  Peripheral IV - Double Lumen 06/14/23 0140 18 G;20 G Right Antecubital 1 day         Peripheral IV - Single Lumen 06/14/23 0149 20 G Left Antecubital 1 day                    Significant Labs:    CBC/Anemia Profile:  Recent Labs   Lab 06/13/23  1415 06/14/23  0431 06/15/23  0543   WBC 15.11* 25.53* 10.62   HGB 9.9* 9.8* 8.4*   HCT 34.5* 35.6* 27.6*   * 114* 104*   MCV 75* 79* 70*   RDW 21.4* 21.6* 20.0*   IRON  --  30  --         Chemistries:  Recent Labs   Lab 06/13/23  1415 06/14/23  0046 06/14/23  0432 06/14/23  1155 06/14/23  1601 06/14/23  2053 06/15/23  0032 06/15/23  0543   * 140   < > 142   < > 141 138 138   K 3.1* 3.8   < > 2.4*   < > 3.0* 2.8* 3.3*    108   < > 109   < > 97 94* 95   CO2 <5* <5*   < > 9*   < > 29 30* 30*   BUN 8 7   < > 9   < > 3* 4* 7   CREATININE 1.1 0.9   < > 0.8   < > 0.4* 0.6 0.8   CALCIUM 8.9 9.1   < > 8.5*   < > 8.4* 8.7 8.3*   ALBUMIN 4.3 4.0  --   --   --   --   --  2.4*   PROT 9.1* 8.8*  --   --   --   --   --  5.9*   BILITOT 0.6 0.6  --   --   --   --   --  1.4*   ALKPHOS 87 106  --   --   --   --   --  85   ALT 24 23  --   --   --   --   --  14   AST 32 31  --   --   --   --    --  28   MG  --   --   --  1.4*  --   --   --  1.5*   PHOS  --   --   --   --   --   --   --  <1.0*    < > = values in this interval not displayed.       A1C:   Recent Labs   Lab 06/14/23  0159   HGBA1C 5.5     ABGs:   Recent Labs   Lab 06/15/23  0910   PH 7.469*   PCO2 53.7*   HCO3 39.0*   POCSATURATED 99   BE 15     Blood Culture:   Recent Labs   Lab 06/14/23  0035 06/14/23  0047   LABBLOO No Growth to date No Growth to date     Coagulation: No results for input(s): PT, INR, APTT in the last 48 hours.  Lactic Acid:   Recent Labs   Lab 06/15/23  0032 06/15/23  0543 06/15/23  0814   LACTATE 1.9 1.8 1.3     Pathology Results  (Last 10 years)      None          POCT Glucose:   Recent Labs   Lab 06/14/23  1603 06/15/23  0036 06/15/23  0545   POCTGLUCOSE 122* 99 101     Respiratory Culture:   Recent Labs   Lab 06/14/23  0659   GSRESP Many WBC's  No organisms seen     Troponin:   Recent Labs   Lab 06/13/23  1415   TROPONINI <0.006     Urine Culture: No results for input(s): LABURIN in the last 48 hours.  Urine Studies: No results for input(s): COLORU, APPEARANCEUA, PHUR, SPECGRAV, PROTEINUA, GLUCUA, KETONESU, BILIRUBINUA, OCCULTUA, NITRITE, UROBILINOGEN, LEUKOCYTESUR, RBCUA, WBCUA, BACTERIA, SQUAMEPITHEL, HYALINECASTS in the last 48 hours.    Invalid input(s): YOSELIN    Significant Imaging:  I have reviewed all pertinent imaging results/findings within the past 24 hours.

## 2023-06-15 NOTE — ASSESSMENT & PLAN NOTE
Severe and out of proportion to lactate and ketones.  Salicylates negative.  Concern for toxic alcohols.  Particularly methanol given reported vision changes.   Concurrent osmolal gap noted on 6/14.  Renal consulted and started on HD.    HAGMA now much improved.

## 2023-06-15 NOTE — PROGRESS NOTES
[facility] - Intensive Care ([facility])  Critical Care Medicine  Progress Note    Patient Name: Shania Tapia  MRN: [id number]  Admission Date: 6/13/2023  Hospital Length of Stay: 1 days  Code Status: Full Code  Attending Provider: Jose Woody MD  Primary Care Provider: Flavia Fink DO   Principal Problem: High anion gap metabolic acidosis    Subjective:     HPI:  37 year old female with known medical history of HTN and anxiety    Presented to ED with complaint of blurred vision and color vision change along with difficulty finding words and unsteady gait  She endorsed having felt jittery for a couple days which she thought was anxiety until above changes after arriving to work that morning  ED initial work up for neuro etiology  CT head negative  MRI brain also with no acute finding  Labs revealed severe metabolic acidosis CO2 less than 5; ABG 7.065/9.8/108/2.8 on room air; d dimer 9.38  CTA chest shows dense RUL pneumonia    ED initiated IVF bolus; abx; bicarb IVP and repeat abg did not improve  Critical care team was contacted for admission for sepsis with severe metabolic acidosis and encephalopathy    On my arrival to ED to examine and admit; pt had just had episode of jerking in hands suspicious for seizure and was given small dose IV ativan after which she became unresponsive and required emergent intubation for airway support and mechanical ventilation.      Hospital/ICU Course:   6/14:   HAGMA out of proportion to lactate and ketones.  Concern for toxic alcohol, possibly methanol given vision changes.  Given fomepizole.  Osmolal gap returned elevated.  Renal consulted and initiated HD.   JANIS Whelan azithro for RUL pna.   6/15:   Remains on vents.  Bicarb up to 30 and pt now alkalemic.  Bicarb drip stopped and ventilation significantly decreased.  Remains on abx.      Interval History:   Unable to obtain interval history or ROS from patient due to intubation and sedation.    Objective:     Vital  Signs (Most Recent):  Temp: 99.2 °F (37.3 °C) (06/15/23 0715)  Pulse: 110 (06/15/23 1000)  Resp: 12 (06/15/23 1000)  BP: 98/61 (06/15/23 1000)  SpO2: 98 % (06/15/23 1000) Vital Signs (24h Range):  Temp:  [98.3 °F (36.8 °C)-101.1 °F (38.4 °C)] 99.2 °F (37.3 °C)  Pulse:  [107-150] 110  Resp:  [12-35] 12  SpO2:  [87 %-100 %] 98 %  BP: ()/(52-73) 98/61  Arterial Line BP: ()/() 111/66     Weight: 69.1 kg (152 lb 5.4 oz)  Body mass index is 28.78 kg/m².      Intake/Output Summary (Last 24 hours) at 6/15/2023 1037  Last data filed at 6/15/2023 1000  Gross per 24 hour   Intake 6097.02 ml   Output 2895 ml   Net 3202.02 ml        Physical Exam  Vitals reviewed.   Constitutional:       Appearance: She is ill-appearing and toxic-appearing.      Interventions: She is sedated and intubated.   HENT:      Head: Normocephalic and atraumatic.      Nose: Nose normal.   Eyes:      Pupils: Pupils are equal, round, and reactive to light.   Cardiovascular:      Rate and Rhythm: Normal rate and regular rhythm.   Pulmonary:      Effort: Pulmonary effort is normal. No respiratory distress. She is intubated.      Comments: Symmetric chest rise.  No distress or dyssynchrony on the ventilator.  Abdominal:      General: Abdomen is flat. There is no distension.      Palpations: Abdomen is soft.   Musculoskeletal:         General: No deformity or signs of injury.      Right lower leg: No edema.      Left lower leg: No edema.   Skin:     General: Skin is warm and dry.   Neurological:      Comments: Sedated.    Does not open eyes to stimulation or voice.    Does not follow commands.             Vents:  Vent Mode: A/C (06/15/23 0907)  Set Rate: 12 BPM (06/15/23 0907)  Vt Set: 350 mL (06/15/23 0907)  PEEP/CPAP: 7 cmH20 (06/15/23 0907)  Oxygen Concentration (%): 40 (06/15/23 1000)  Peak Airway Pressure: 18 cmH20 (06/15/23 0907)  Plateau Pressure: 20 cmH20 (06/15/23 0907)  Total Ve: 4.39 L/m (06/15/23 0907)  Negative Inspiratory Force  (cm H2O): 0 (06/15/23 0907)  F/VT Ratio<105 (RSBI): (!) 32.79 (06/15/23 0907)    Lines/Drains/Airways       Central Venous Catheter Line  Duration             Trialysis (Dialysis) Catheter 06/14/23 1801 right internal jugular <1 day              Drain  Duration                  Urethral Catheter 06/14/23 0320 Non-latex;Silicone 16 Fr. 1 day         NG/OG Tube 06/15/23 0700 Center mouth <1 day              Airway  Duration                  Airway - Non-Surgical 06/14/23 0200 Endotracheal Tube 1 day              Arterial Line  Duration             Arterial Line 06/14/23 1030 Right Radial 1 day              Peripheral Intravenous Line  Duration                  Peripheral IV - Double Lumen 06/14/23 0140 18 G;20 G Right Antecubital 1 day         Peripheral IV - Single Lumen 06/14/23 0149 20 G Left Antecubital 1 day                    Significant Labs:    CBC/Anemia Profile:  Recent Labs   Lab 06/13/23  1415 06/14/23  0431 06/15/23  0543   WBC 15.11* 25.53* 10.62   HGB 9.9* 9.8* 8.4*   HCT 34.5* 35.6* 27.6*   * 114* 104*   MCV 75* 79* 70*   RDW 21.4* 21.6* 20.0*   IRON  --  30  --         Chemistries:  Recent Labs   Lab 06/13/23  1415 06/14/23  0046 06/14/23  0432 06/14/23  1155 06/14/23  1601 06/14/23  2053 06/15/23  0032 06/15/23  0543   * 140   < > 142   < > 141 138 138   K 3.1* 3.8   < > 2.4*   < > 3.0* 2.8* 3.3*    108   < > 109   < > 97 94* 95   CO2 <5* <5*   < > 9*   < > 29 30* 30*   BUN 8 7   < > 9   < > 3* 4* 7   CREATININE 1.1 0.9   < > 0.8   < > 0.4* 0.6 0.8   CALCIUM 8.9 9.1   < > 8.5*   < > 8.4* 8.7 8.3*   ALBUMIN 4.3 4.0  --   --   --   --   --  2.4*   PROT 9.1* 8.8*  --   --   --   --   --  5.9*   BILITOT 0.6 0.6  --   --   --   --   --  1.4*   ALKPHOS 87 106  --   --   --   --   --  85   ALT 24 23  --   --   --   --   --  14   AST 32 31  --   --   --   --   --  28   MG  --   --   --  1.4*  --   --   --  1.5*   PHOS  --   --   --   --   --   --   --  <1.0*    < > = values in this  interval not displayed.       A1C:   Recent Labs   Lab 06/14/23  0159   HGBA1C 5.5     ABGs:   Recent Labs   Lab 06/15/23  0910   PH 7.469*   PCO2 53.7*   HCO3 39.0*   POCSATURATED 99   BE 15     Blood Culture:   Recent Labs   Lab 06/14/23  0035 06/14/23  0047   LABBLOO No Growth to date No Growth to date     Coagulation: No results for input(s): PT, INR, APTT in the last 48 hours.  Lactic Acid:   Recent Labs   Lab 06/15/23  0032 06/15/23  0543 06/15/23  0814   LACTATE 1.9 1.8 1.3     Pathology Results  (Last 10 years)      None          POCT Glucose:   Recent Labs   Lab 06/14/23  1603 06/15/23  0036 06/15/23  0545   POCTGLUCOSE 122* 99 101     Respiratory Culture:   Recent Labs   Lab 06/14/23  0659   GSRESP Many WBC's  No organisms seen     Troponin:   Recent Labs   Lab 06/13/23  1415   TROPONINI <0.006     Urine Culture: No results for input(s): LABURIN in the last 48 hours.  Urine Studies: No results for input(s): COLORU, APPEARANCEUA, PHUR, SPECGRAV, PROTEINUA, GLUCUA, KETONESU, BILIRUBINUA, OCCULTUA, NITRITE, UROBILINOGEN, LEUKOCYTESUR, RBCUA, WBCUA, BACTERIA, SQUAMEPITHEL, HYALINECASTS in the last 48 hours.    Invalid input(s): WRIGHTSUR    Significant Imaging:  I have reviewed all pertinent imaging results/findings within the past 24 hours.      ABG  Recent Labs   Lab 06/15/23  0910   PH 7.469*   PO2 111*   PCO2 53.7*   HCO3 39.0*   BE 15     Assessment/Plan:     Neuro  Encephalopathy  Negative CT Head and MRI in ED  Suspect related to sepsis and or toxicologic etiologies  Broad spectrum abx and supportive care    Pulmonary  On mechanically assisted ventilation  Required intubation for airway protection / respiratory arrest  Adjust vent as needed  VAP prophylaxis      Pneumonia of right upper lobe due to infectious organism  Vanco, CFP, azithro  Sputum sent from endotracheal aspirate for culture    Renal/  * High anion gap metabolic acidosis  Severe and out of proportion to lactate and ketones.   Salicylates negative.  Concern for toxic alcohols.  Particularly methanol given reported vision changes.   Concurrent osmolal gap noted on 6/14.  Renal consulted and started on HD.    HAGMA now much improved.    ID  Severe sepsis  This patient does have evidence of infective focus  My overall impression is sepsis.  Source: Respiratory (confirmed with CT imaging) and Neurologic (Meningitis/Encephalitis less likely)  Antibiotics given-   Antibiotics (72h ago, onward)    Start     Stop Route Frequency Ordered    06/14/23 2130  vancomycin (VANCOCIN) 1,000 mg in dextrose 5 % (D5W) 250 mL IVPB (Vial-Mate)         -- IV Every 12 hours (non-standard times) 06/14/23 1545    06/14/23 0930  ceFEPIme (MAXIPIME) 2 g in dextrose 5 % in water (D5W) 5 % 100 mL IVPB (MB+)         -- IV Every 8 hours (non-standard times) 06/14/23 0136    06/14/23 0900  mupirocin 2 % ointment  (DECOLONIZATION PROTOCOL ORDERS)         06/19 0859 Nasl 2 times daily 06/14/23 0122    06/14/23 0758  vancomycin - pharmacy to dose  (vancomycin IVPB)        See Hyperspace for full Linked Orders Report.    -- IV pharmacy to manage frequency 06/14/23 0658    06/14/23 0135  azithromycin (ZITHROMAX) 500 mg in dextrose 5 % (D5W) 250 mL IVPB (Vial-Mate)         06/17 0144 IV Every 24 hours (non-standard times) 06/14/23 0136        Latest lactate reviewed-  Recent Labs   Lab 06/15/23  0032 06/15/23  0543 06/15/23  0814   LACTATE 1.9 1.8 1.3     Organ dysfunction indicated by Encephalopathy    Fluid challenge Not needed - patient is not hypotensive      Post- resuscitation assessment No - Post resuscitation assessment not needed       Will Not start Pressors- Levophed for MAP of 65  Source control achieved by: abx as above      Prophylaxis Measures:  GI ppx: Famotidine  VTE ppx: Enoxaparin  Glucose control: Insulin subcutaneous    Code Status: Full Code      Critical Care Time: 40 minutes  Critical secondary to patient on mechanical ventilator and continuous  sedation.     Critical care was time spent personally by me on the following activities: development of treatment plan with patient or surrogate and bedside caregivers, discussions with consultants, evaluation of patient's response to treatment, examination of patient, ordering and performing treatments and interventions, ordering and review of laboratory studies, ordering and review of radiographic studies, pulse oximetry, re-evaluation of patient's condition. This critical care time did not overlap with that of any other provider or involve time for any procedures.     Jose Woody MD  Critical Care Medicine  [facility] - Intensive [facility])

## 2023-06-15 NOTE — PLAN OF CARE
POC reviewed. Pt dialyzed this shift and plan to spin again today. Potassium was 2.8 at midnight check and replaced with 40 po and 40 iv awaiting recheck still. Pt became hypotensive and tachycardic gave 500 NS bolus and started melba with relief. Pt also spiked fever lats night and gave prn tylenol with relief. Report to be given to day shift RN who will assume care.

## 2023-06-15 NOTE — SUBJECTIVE & OBJECTIVE
Interval History: Pt was seen and examined in ICU. Labs and meds reviewed. Discussed with other providers. No new events, remained intubated overnight, was just extubated a few minutes ago. Denies any immediate discomfort. Denies any toxin intake at work or home.    Review of patient's allergies indicates:   Allergen Reactions    Latex Rash    Latex, natural rubber Rash     Current Facility-Administered Medications   Medication Frequency    acetaminophen oral solution 650 mg Q6H PRN    azithromycin (ZITHROMAX) 500 mg in dextrose 5 % (D5W) 250 mL IVPB (Vial-Mate) Q24H    ceFEPIme (MAXIPIME) 2 g in dextrose 5 % in water (D5W) 5 % 100 mL IVPB (MB+) Q8H    chlorhexidine 0.12 % solution 15 mL BID    dextrose 10% bolus 125 mL 125 mL PRN    dextrose 10% bolus 250 mL 250 mL PRN    enoxaparin injection 40 mg Daily    famotidine 40 mg/5 mL (8 mg/mL) suspension 20 mg BID    fentaNYL 2500 mcg in 0.9% sodium chloride 250 mL infusion premix (titrating) Continuous    folic acid 5 mg in dextrose 5 % (D5W) 250 mL IVPB Q6H    fomepizole (ANTIZOL) 690 mg in dextrose 5 % (D5W) 100 mL IVPB Q12H    glucagon (human recombinant) injection 1 mg PRN    insulin aspart U-100 pen 1-10 Units Q6H PRN    magnesium sulfate 2g in water 50mL IVPB (premix) PRN    magnesium sulfate 2g in water 50mL IVPB (premix) PRN    multivit-min-ferrous gluconate 9 mg iron/15 mL oral liquid 15 mL Daily    mupirocin 2 % ointment BID    ondansetron injection 4 mg Q8H PRN    PHENYLephrine (MARCELLO-SYNEPHRINE) 20 mg in sodium chloride 0.9% 250ml (titrating) Continuous    potassium chloride 40 mEq in 100 mL IVPB (FOR CENTRAL LINE ADMINISTRATION ONLY) PRN    And    potassium chloride 20 mEq in 100 mL IVPB (FOR CENTRAL LINE ADMINISTRATION ONLY) PRN    And    potassium chloride 40 mEq in 100 mL IVPB (FOR CENTRAL LINE ADMINISTRATION ONLY) PRN    potassium phosphate 30 mmol in dextrose 5 % (D5W) 500 mL infusion Once    propofol (DIPRIVAN) 10 mg/mL infusion Continuous     senna-docusate 8.6-50 mg per tablet 2 tablet BID    thiamine (B-1) 500 mg in dextrose 5 % (D5W) 100 mL IVPB Q8H    Followed by    [START ON 6/16/2023] thiamine (B-1) 100 mg in dextrose 5 % (D5W) 100 mL IVPB Daily    vancomycin (VANCOCIN) 1,000 mg in dextrose 5 % (D5W) 250 mL IVPB (Vial-Mate) Q12H    vancomycin - pharmacy to dose pharmacy to manage frequency       Objective:     Vital Signs (Most Recent):  Temp: 99.2 °F (37.3 °C) (06/15/23 0715)  Pulse: (!) 127 (06/15/23 1237)  Resp: (!) 24 (06/15/23 1237)  BP: (!) 93/56 (06/15/23 1100)  SpO2: (!) 92 % (06/15/23 1237) Vital Signs (24h Range):  Temp:  [98.3 °F (36.8 °C)-101.1 °F (38.4 °C)] 99.2 °F (37.3 °C)  Pulse:  [102-150] 127  Resp:  [10-34] 24  SpO2:  [87 %-100 %] 92 %  BP: ()/(52-73) 93/56  Arterial Line BP: ()/() 111/67     Weight: 69.1 kg (152 lb 5.4 oz) (06/14/23 1017)  Body mass index is 28.78 kg/m².  Body surface area is 1.72 meters squared.    I/O last 3 completed shifts:  In: 8747.1 [I.V.:4652.4; IV Piggyback:4094.7]  Out: 5880 [Urine:4880; Other:1000]     Physical Exam  Vitals and nursing note reviewed.   Constitutional:       Appearance: Normal appearance.   Cardiovascular:      Rate and Rhythm: Normal rate and regular rhythm.      Pulses: Normal pulses.      Heart sounds: Normal heart sounds.   Pulmonary:      Breath sounds: No rales.   Abdominal:      Palpations: Abdomen is soft.   Musculoskeletal:      Right lower leg: No edema.      Left lower leg: No edema.   Neurological:      Mental Status: She is oriented to person, place, and time.   Psychiatric:         Behavior: Behavior normal.        Significant Labs: reviewed  BMP  Lab Results   Component Value Date     06/15/2023    K 3.3 (L) 06/15/2023    CL 95 06/15/2023    CO2 30 (H) 06/15/2023    BUN 7 06/15/2023    CREATININE 0.8 06/15/2023    CALCIUM 8.3 (L) 06/15/2023    ANIONGAP 13 06/15/2023    EGFRNORACEVR >60 06/15/2023     Lab Results   Component Value Date    WBC 10.62  06/15/2023    HGB 8.4 (L) 06/15/2023    HCT 27.6 (L) 06/15/2023    MCV 70 (L) 06/15/2023     (L) 06/15/2023     Methanol and ethylene glycol pending    Significant Imaging:  reviewed CXR

## 2023-06-16 PROBLEM — E83.39 HYPOPHOSPHATEMIA: Status: ACTIVE | Noted: 2023-06-16

## 2023-06-16 PROBLEM — F41.9 ANXIETY: Status: ACTIVE | Noted: 2023-06-16

## 2023-06-16 PROBLEM — E87.6 HYPOKALEMIA: Status: ACTIVE | Noted: 2023-06-16

## 2023-06-16 PROBLEM — E87.6 HYPOKALEMIA: Status: RESOLVED | Noted: 2023-06-16 | Resolved: 2023-06-16

## 2023-06-16 PROBLEM — E83.42 HYPOMAGNESEMIA: Status: ACTIVE | Noted: 2023-06-16

## 2023-06-16 LAB
ALBUMIN SERPL BCP-MCNC: 2.6 G/DL (ref 3.5–5.2)
ALLENS TEST: ABNORMAL
ALP SERPL-CCNC: 108 U/L (ref 55–135)
ALT SERPL W/O P-5'-P-CCNC: 20 U/L (ref 10–44)
ANION GAP SERPL CALC-SCNC: 10 MMOL/L (ref 8–16)
ANION GAP SERPL CALC-SCNC: 9 MMOL/L (ref 8–16)
ANISOCYTOSIS BLD QL SMEAR: SLIGHT
AST SERPL-CCNC: 55 U/L (ref 10–40)
BASOPHILS # BLD AUTO: 0.04 K/UL (ref 0–0.2)
BASOPHILS NFR BLD: 0.4 % (ref 0–1.9)
BILIRUB DIRECT SERPL-MCNC: 0.4 MG/DL (ref 0.1–0.3)
BILIRUB SERPL-MCNC: 0.9 MG/DL (ref 0.1–1)
BUN SERPL-MCNC: 9 MG/DL (ref 6–20)
BUN SERPL-MCNC: 9 MG/DL (ref 6–20)
CALCIUM SERPL-MCNC: 8.1 MG/DL (ref 8.7–10.5)
CALCIUM SERPL-MCNC: 8.9 MG/DL (ref 8.7–10.5)
CHLORIDE SERPL-SCNC: 100 MMOL/L (ref 95–110)
CHLORIDE SERPL-SCNC: 100 MMOL/L (ref 95–110)
CO2 SERPL-SCNC: 32 MMOL/L (ref 23–29)
CO2 SERPL-SCNC: 36 MMOL/L (ref 23–29)
CREAT SERPL-MCNC: 0.9 MG/DL (ref 0.5–1.4)
CREAT SERPL-MCNC: 1 MG/DL (ref 0.5–1.4)
DACRYOCYTES BLD QL SMEAR: ABNORMAL
DELSYS: ABNORMAL
DIFFERENTIAL METHOD: ABNORMAL
EOSINOPHIL # BLD AUTO: 0 K/UL (ref 0–0.5)
EOSINOPHIL NFR BLD: 0.2 % (ref 0–8)
ERYTHROCYTE [DISTWIDTH] IN BLOOD BY AUTOMATED COUNT: 20.5 % (ref 11.5–14.5)
EST. GFR  (NO RACE VARIABLE): >60 ML/MIN/1.73 M^2
EST. GFR  (NO RACE VARIABLE): >60 ML/MIN/1.73 M^2
ETHYLENE GLYCOL SERPLBLD-MCNC: NOT DETECTED MG/DL
GLUCOSE SERPL-MCNC: 121 MG/DL (ref 70–110)
GLUCOSE SERPL-MCNC: 99 MG/DL (ref 70–110)
HCO3 UR-SCNC: 37.6 MMOL/L (ref 24–28)
HCT VFR BLD AUTO: 28.6 % (ref 37–48.5)
HGB BLD-MCNC: 8.5 G/DL (ref 12–16)
IMM GRANULOCYTES # BLD AUTO: 0.15 K/UL (ref 0–0.04)
IMM GRANULOCYTES NFR BLD AUTO: 1.4 % (ref 0–0.5)
LYMPHOCYTES # BLD AUTO: 0.8 K/UL (ref 1–4.8)
LYMPHOCYTES NFR BLD: 7 % (ref 18–48)
MAGNESIUM SERPL-MCNC: 2.4 MG/DL (ref 1.6–2.6)
MCH RBC QN AUTO: 21.3 PG (ref 27–31)
MCHC RBC AUTO-ENTMCNC: 29.7 G/DL (ref 32–36)
MCV RBC AUTO: 72 FL (ref 82–98)
MODE: ABNORMAL
MONOCYTES # BLD AUTO: 1 K/UL (ref 0.3–1)
MONOCYTES NFR BLD: 9.8 % (ref 4–15)
NEUTROPHILS # BLD AUTO: 8.7 K/UL (ref 1.8–7.7)
NEUTROPHILS NFR BLD: 81.2 % (ref 38–73)
NRBC BLD-RTO: 0 /100 WBC
PCO2 BLDA: 43.7 MMHG (ref 35–45)
PH SMN: 7.54 [PH] (ref 7.35–7.45)
PHOSPHATE SERPL-MCNC: 1.6 MG/DL (ref 2.7–4.5)
PHOSPHATE SERPL-MCNC: <1 MG/DL (ref 2.7–4.5)
PLATELET # BLD AUTO: 127 K/UL (ref 150–450)
PLATELET BLD QL SMEAR: ABNORMAL
PMV BLD AUTO: ABNORMAL FL (ref 9.2–12.9)
PO2 BLDA: 47 MMHG (ref 80–100)
POC BE: 15 MMOL/L
POC SATURATED O2: 87 % (ref 95–100)
POCT GLUCOSE: 131 MG/DL (ref 70–110)
POCT GLUCOSE: 95 MG/DL (ref 70–110)
POTASSIUM SERPL-SCNC: 2.6 MMOL/L (ref 3.5–5.1)
POTASSIUM SERPL-SCNC: 3.7 MMOL/L (ref 3.5–5.1)
PROCALCITONIN SERPL IA-MCNC: 3.55 NG/ML
PROT SERPL-MCNC: 6.4 G/DL (ref 6–8.4)
RBC # BLD AUTO: 3.99 M/UL (ref 4–5.4)
SAMPLE: ABNORMAL
SITE: ABNORMAL
SODIUM SERPL-SCNC: 141 MMOL/L (ref 136–145)
SODIUM SERPL-SCNC: 146 MMOL/L (ref 136–145)
WBC # BLD AUTO: 10.65 K/UL (ref 3.9–12.7)

## 2023-06-16 PROCEDURE — 97530 THERAPEUTIC ACTIVITIES: CPT

## 2023-06-16 PROCEDURE — 97166 OT EVAL MOD COMPLEX 45 MIN: CPT

## 2023-06-16 PROCEDURE — 84100 ASSAY OF PHOSPHORUS: CPT | Performed by: INTERNAL MEDICINE

## 2023-06-16 PROCEDURE — 92523 SPEECH SOUND LANG COMPREHEN: CPT

## 2023-06-16 PROCEDURE — G0425 INPT/ED TELECONSULT30: HCPCS | Mod: GT,,, | Performed by: PSYCHIATRY & NEUROLOGY

## 2023-06-16 PROCEDURE — 85025 COMPLETE CBC W/AUTO DIFF WBC: CPT | Performed by: NURSE PRACTITIONER

## 2023-06-16 PROCEDURE — 80048 BASIC METABOLIC PNL TOTAL CA: CPT | Performed by: INTERNAL MEDICINE

## 2023-06-16 PROCEDURE — 27000221 HC OXYGEN, UP TO 24 HOURS

## 2023-06-16 PROCEDURE — 25000003 PHARM REV CODE 250: Performed by: INTERNAL MEDICINE

## 2023-06-16 PROCEDURE — 63600175 PHARM REV CODE 636 W HCPCS: Mod: JZ,JG | Performed by: INTERNAL MEDICINE

## 2023-06-16 PROCEDURE — 83735 ASSAY OF MAGNESIUM: CPT | Performed by: NURSE PRACTITIONER

## 2023-06-16 PROCEDURE — 25000003 PHARM REV CODE 250: Performed by: HOSPITALIST

## 2023-06-16 PROCEDURE — 63600175 PHARM REV CODE 636 W HCPCS: Performed by: INTERNAL MEDICINE

## 2023-06-16 PROCEDURE — 21400001 HC TELEMETRY ROOM

## 2023-06-16 PROCEDURE — 97162 PT EVAL MOD COMPLEX 30 MIN: CPT

## 2023-06-16 PROCEDURE — 80076 HEPATIC FUNCTION PANEL: CPT | Performed by: NURSE PRACTITIONER

## 2023-06-16 PROCEDURE — G0425 PR INPT TELEHEALTH CONSULT 30M: ICD-10-PCS | Mod: GT,,, | Performed by: PSYCHIATRY & NEUROLOGY

## 2023-06-16 PROCEDURE — 99900035 HC TECH TIME PER 15 MIN (STAT)

## 2023-06-16 PROCEDURE — 92610 EVALUATE SWALLOWING FUNCTION: CPT

## 2023-06-16 PROCEDURE — 99233 PR SUBSEQUENT HOSPITAL CARE,LEVL III: ICD-10-PCS | Mod: ,,, | Performed by: INTERNAL MEDICINE

## 2023-06-16 PROCEDURE — 80048 BASIC METABOLIC PNL TOTAL CA: CPT | Mod: 91 | Performed by: INTERNAL MEDICINE

## 2023-06-16 PROCEDURE — 36600 WITHDRAWAL OF ARTERIAL BLOOD: CPT

## 2023-06-16 PROCEDURE — 99233 SBSQ HOSP IP/OBS HIGH 50: CPT | Mod: ,,, | Performed by: INTERNAL MEDICINE

## 2023-06-16 PROCEDURE — 63600175 PHARM REV CODE 636 W HCPCS: Performed by: NURSE PRACTITIONER

## 2023-06-16 PROCEDURE — 36569 INSJ PICC 5 YR+ W/O IMAGING: CPT

## 2023-06-16 PROCEDURE — C1751 CATH, INF, PER/CENT/MIDLINE: HCPCS

## 2023-06-16 PROCEDURE — 84100 ASSAY OF PHOSPHORUS: CPT | Mod: 91 | Performed by: INTERNAL MEDICINE

## 2023-06-16 PROCEDURE — 84145 PROCALCITONIN (PCT): CPT | Performed by: NURSE PRACTITIONER

## 2023-06-16 PROCEDURE — 82803 BLOOD GASES ANY COMBINATION: CPT

## 2023-06-16 RX ORDER — AMOXICILLIN 250 MG
2 CAPSULE ORAL 2 TIMES DAILY
Status: DISCONTINUED | OUTPATIENT
Start: 2023-06-16 | End: 2023-06-20 | Stop reason: HOSPADM

## 2023-06-16 RX ORDER — ACETAMINOPHEN 325 MG/1
650 TABLET ORAL EVERY 6 HOURS PRN
Status: DISCONTINUED | OUTPATIENT
Start: 2023-06-16 | End: 2023-06-20 | Stop reason: HOSPADM

## 2023-06-16 RX ORDER — SODIUM CHLORIDE 9 MG/ML
INJECTION, SOLUTION INTRAVENOUS
Status: DISCONTINUED | OUTPATIENT
Start: 2023-06-16 | End: 2023-06-20 | Stop reason: HOSPADM

## 2023-06-16 RX ORDER — SODIUM CHLORIDE AND POTASSIUM CHLORIDE 150; 450 MG/100ML; MG/100ML
INJECTION, SOLUTION INTRAVENOUS CONTINUOUS
Status: DISCONTINUED | OUTPATIENT
Start: 2023-06-16 | End: 2023-06-17

## 2023-06-16 RX ORDER — FAMOTIDINE 20 MG/1
20 TABLET, FILM COATED ORAL 2 TIMES DAILY
Status: DISCONTINUED | OUTPATIENT
Start: 2023-06-16 | End: 2023-06-20 | Stop reason: HOSPADM

## 2023-06-16 RX ADMIN — FOLIC ACID 5 MG: 5 INJECTION, SOLUTION INTRAMUSCULAR; INTRAVENOUS; SUBCUTANEOUS at 12:06

## 2023-06-16 RX ADMIN — ENOXAPARIN SODIUM 40 MG: 40 INJECTION SUBCUTANEOUS at 05:06

## 2023-06-16 RX ADMIN — METOPROLOL TARTRATE 25 MG: 25 TABLET, FILM COATED ORAL at 09:06

## 2023-06-16 RX ADMIN — FAMOTIDINE 20 MG: 20 TABLET, FILM COATED ORAL at 09:06

## 2023-06-16 RX ADMIN — POTASSIUM PHOSPHATE, MONOBASIC AND POTASSIUM PHOSPHATE, DIBASIC 15 MMOL: 224; 236 INJECTION, SOLUTION, CONCENTRATE INTRAVENOUS at 03:06

## 2023-06-16 RX ADMIN — MUPIROCIN: 20 OINTMENT TOPICAL at 09:06

## 2023-06-16 RX ADMIN — POTASSIUM CHLORIDE AND SODIUM CHLORIDE: 450; 150 INJECTION, SOLUTION INTRAVENOUS at 03:06

## 2023-06-16 RX ADMIN — THERA TABS 1 TABLET: TAB at 09:06

## 2023-06-16 RX ADMIN — THIAMINE HYDROCHLORIDE 500 MG: 100 INJECTION, SOLUTION INTRAMUSCULAR; INTRAVENOUS at 03:06

## 2023-06-16 RX ADMIN — FOLIC ACID 5 MG: 5 INJECTION, SOLUTION INTRAMUSCULAR; INTRAVENOUS; SUBCUTANEOUS at 11:06

## 2023-06-16 RX ADMIN — FOMEPIZOLE 690 MG: 1 INJECTION, SOLUTION INTRAVENOUS at 05:06

## 2023-06-16 RX ADMIN — CEFTRIAXONE 1 G: 1 INJECTION, POWDER, FOR SOLUTION INTRAMUSCULAR; INTRAVENOUS at 06:06

## 2023-06-16 RX ADMIN — FOLIC ACID 5 MG: 5 INJECTION, SOLUTION INTRAMUSCULAR; INTRAVENOUS; SUBCUTANEOUS at 04:06

## 2023-06-16 RX ADMIN — SODIUM CHLORIDE: 9 INJECTION, SOLUTION INTRAVENOUS at 06:06

## 2023-06-16 RX ADMIN — POTASSIUM CHLORIDE 80 MEQ: 29.8 INJECTION, SOLUTION INTRAVENOUS at 06:06

## 2023-06-16 RX ADMIN — SODIUM CHLORIDE: 9 INJECTION, SOLUTION INTRAVENOUS at 03:06

## 2023-06-16 RX ADMIN — SENNOSIDES AND DOCUSATE SODIUM 2 TABLET: 50; 8.6 TABLET ORAL at 09:06

## 2023-06-16 RX ADMIN — THIAMINE HYDROCHLORIDE 100 MG: 100 INJECTION, SOLUTION INTRAMUSCULAR; INTRAVENOUS at 12:06

## 2023-06-16 RX ADMIN — AZITHROMYCIN MONOHYDRATE 500 MG: 500 INJECTION, POWDER, LYOPHILIZED, FOR SOLUTION INTRAVENOUS at 01:06

## 2023-06-16 RX ADMIN — FOMEPIZOLE 690 MG: 1 INJECTION, SOLUTION INTRAVENOUS at 02:06

## 2023-06-16 RX ADMIN — POTASSIUM PHOSPHATE, MONOBASIC AND POTASSIUM PHOSPHATE, DIBASIC 15 MMOL: 224; 236 INJECTION, SOLUTION, CONCENTRATE INTRAVENOUS at 07:06

## 2023-06-16 RX ADMIN — FOLIC ACID 5 MG: 5 INJECTION, SOLUTION INTRAMUSCULAR; INTRAVENOUS; SUBCUTANEOUS at 05:06

## 2023-06-16 NOTE — PLAN OF CARE
Patient updated on plan of care. Fall  and security precautions in place. Vitals every 4 hours. Bed alarm on. Patient remains free from falls. Education provided; questions encouraged and answered. Pt has PICC line in upper left arm, IV abx, 5L O2 with humidifier, disoriented to situation.

## 2023-06-16 NOTE — PLAN OF CARE
Pt intermentenly disoriented to place and situation; visual hallucinatons present. Tele psych eval completed at bedside. VSS. Little intact. POC reviewed with patient. Safety and fall precautions maintained.

## 2023-06-16 NOTE — ASSESSMENT & PLAN NOTE
Patient reports seeing things that she knows it is not there  Additionally also reports to a lot of stress and anxiety associated with her job in addition to unintentional 10-15 lb weight loss over the last couple of months  Tele psych consult pending

## 2023-06-16 NOTE — PT/OT/SLP EVAL
Speech Language Pathology Evaluation  Cognitive/Bedside Swallow    Patient Name:  Shania Tapia   MRN:  [id number]  Admitting Diagnosis: Metabolic acidosis    Recommendations:                  General Recommendations:  Dysphagia therapy, Speech/language therapy, and Cognitive-linguistic therapy  Diet recommendations:  Soft & Bite Sized Diet - IDDSI Level 6, Thin liquids - IDDSI Level 0   Aspiration Precautions: Standard aspiration precautions   General Precautions: Standard, aphasia, aspiration, fall  Communication strategies:  provide increased time to answer    Assessment:     Shania Tapia is a 37 y.o. female with an SLP diagnosis of Aphasia, Dysphagia, and Cognitive-Linguistic Impairment.  She exhibited delayed processing, word finding deficits, flat affect, decrease attention to tasks, and anomia deficits during structured conversational tasks. She would benefit from continued skilled speech therapy.     History:     Past Medical History:   Diagnosis Date    Abnormal Pap smear     Abnormal Pap smear of vagina     Anemia     Anxiety     Hypertension        Past Surgical History:   Procedure Laterality Date    CERVICAL BIOPSY      Conization        Social History: Patient lives with home with spouse.    Prior Intubation HX:  6/13-6/15/23    Chest X-Rays: 6/14/23  EXAMINATION:  XR CHEST AP PORTABLE     CLINICAL HISTORY:  HD line placement;     TECHNIQUE:  Single frontal portable view of the chest was performed.     COMPARISON:  Earlier imaging same date     FINDINGS:  There has been interval extubation.  Right IJ line in apparent good position distal tip expected SVC.  Enteric catheter usual course.  Right perihilar upper lobe airspace opacity consolidation persists.  There is also new hazy left basilar airspace opacity.  No sizable pleural effusion or convincing pneumothorax     Impression:     Satisfactory line placement; bilateral pulmonary airspace opacity    Prior diet: Regular diet with thin  liquids.    Subjective     Patient alert, cooperative, and seen at bedside with spouse. Spouse reported patient experiencing visual hallucinations.   Patient goals: get real food     Pain/Comfort:  Pain Rating 1: 0/10  Pain Rating Post-Intervention 1: 0/10    Respiratory Status: Nasal cannula    Objective:     Cognitive Status:    Attention Sustained attention deficit impaired  Orientation Oriented x4  Memory Immediate Recall 3/3 and Delayed 2/3       Receptive Language:   Comprehension:      Questions Simple yes/no adequate\  Complex yes/no adequate  Commands  One step adequate  two step basic commands adequate    Pragmatics:    inconsistent eye contact  , turn taking impaired, and topic maintenance impaired , flat affect    Expressive Language:  Verbal:    Automatic Speech  Months of the year adequate and Phrase completion adequate  Initiation impaired  Repetition Words adequate and Sentences impaired  Naming Confrontation impaired  Conversational speech impaired    Voice:   Quality Hoarse  Intensity decrease volume    Visual-Spatial:  impaired    Written Expression:   adequate    Oral Musculature Evaluation  Oral Musculature: general weakness  Dentition: present and adequate    Bedside Swallow Eval:   Consistencies Assessed:  Thin liquids via cup  Puree smooth  Soft solids saltine cracker      Oral Phase:   Prolonged mastication  Slow oral transit time    Pharyngeal Phase:   no overt clinical signs/symptoms of aspiration    Compensatory Strategies  Education on dysphagia strategies    Treatment: Bedside swallowing evaluation completed. Patient with generalized weakness upon OME. She was oriented X4. She exhibited delayed processing, word finding deficits, flat affect, decrease attention to tasks, and anomia deficits during structured conversational tasks. Patient tends to masks deficits with humor. She exhibited visual deficits and hallucinations. She exhibited adequate auditory comprehension, repetition of  words, and automatic speech. She presented with impaired confrontation naming; however unable to determine if deficits due to language or visual impairments. Patient with dry productive cough prior, during, and post po trials. Observed intake of thin liquids via cup, smooth puree, and soft solids. Patient with impulsive intake. She exhibited oral prep and slow a-p transit. Recommend soft mechanical diet with thin liquids and standard aspiration precautions.    Goals:   Multidisciplinary Problems       SLP Goals          Problem: SLP    Goal Priority Disciplines Outcome   SLP Goal     SLP    Description: Patient will tolerate bedside po trials for diet upgrade  TPW complete further cognitive-linguistic testing to assess current level of functioning                       Plan:     Patient to be seen:  2 x/week   Plan of Care expires:     Plan of Care reviewed with:  patient, spouse   SLP Follow-Up:  Yes       Discharge recommendations:  Discharge Facility/Level of Care Needs: rehabilitation facility     Time Tracking:     SLP Treatment Date:   06/16/23  Speech Start Time:  1041  Speech Stop Time:  1105     Speech Total Time (min):  24 min    Billable Minutes: Eval 24 06/16/2023

## 2023-06-16 NOTE — ASSESSMENT & PLAN NOTE
Negative imaging with CT head and MRI brain  Suspected to be related to sepsis and toxic logic etiologies  Continuing broad-spectrum antibiotics and supportive care

## 2023-06-16 NOTE — PLAN OF CARE
OT aries completed. Sup>sit min A, sit>stand max A of 2, stand>pivot to bedside chair with max A of 2. Recommending inpatient rehab at d/c.

## 2023-06-16 NOTE — ASSESSMENT & PLAN NOTE
Initially presented with high anion gap metabolic acidosis and high osmolar gap with CO2 < 5, pH 7.0  Severe and out of proportion to lactate and ketones.  Salicylates negative.  Concern for toxic alcohols.  Particularly methanol given reported vision changes.   Methanol, ethylene glycol, diastolic glycol labs pending  Concurrent osmolal gap noted on 6/14  Nephrology consulted, patient had emergency HD done in addition to initiation bicarbonate infusion   Acidosis improved

## 2023-06-16 NOTE — CONSULTS
"[facility]  Psychiatry  Telepsychiatry Consult Note    Please see previous notes:    Patient agreeable to consultation via telepsychiatry.    Tele-Consultation from Psychiatry started: 6/16/2023 at 0039  The chief complaint leading to psychiatric consultation is: VH, concern for toxic ETOH ingestion  This consultation was requested by Dr. Jose Woody, the Emergency Department attending physician.  The location of the consulting psychiatrist is  [place], [place] .  The patient location is  [facility] INTENSIVE CARE UNIT   The patient arrived at the ED at: 6/13/23 at 1351  Also present with the patient at the time of the consultation: none    Patient Identification:   Shania Tapia is a 37 y.o. female.    Patient information was obtained from patient.  Patient presented voluntarily to the Emergency Department    Inpatient consult to Telemedicine - Psych  Consult performed by: Kendy Wu MD  Consult ordered by: Jose Woody MD  Reason for consult: VH, concern for toxic ETOH ingestion      Consult Start Time: 06/16/2023 00:39 CDT  Consult End Time: 06/16/2023 01:17 CDT      Subjective:     History of Present Illness:  Patient is a 37yoF with no significant past psychiatric history. Patient was having bad anxiety and feeling nauseous. That started about two days ago. She thought it was stress at work. Denies having ingested methanol. Patient says that she has anxiety, but she was waiting to see her PCP to start medications.    Patient endorses "not the same" mood and Denies anhedonia. Sleeping 6-8 hours per night. Eating ok. Energy level is down. Denies feelings of hopelessness/guilt. Patient denies any sober period of sleep deficit associated with grandiosity/irritability, distractibility, impulsivity, racing thoughts, increased activity and talkativeness. The patient denies any current or history of SI/HI or any plan/intent to self-harm or harm others. Denies AH/VH, paranoia. No vocalized " "delusions.    , Vladislav Denton  [phone]    Psychiatric History:   Previous Psychiatric Hospitalizations: No  Previous Medication Trials: No  Previous Suicide Attempts: no  History of Violence: no  History of Depression: no  History of Davina: no  History of Auditory/Visual Hallucination no  History of Delusions: no vocalized delusions  Outpatient psychiatrist (current & past): No    Substance Abuse History:  Tobacco:No  Alcohol: No  Illicit Substances:No  Detox/Rehab: No    Legal History: Past charges/incarcerations: No     Family Psychiatric History: denied    Social History:  *Education: completed college  Employment Status/Finances:  Relationship Status/Sexual Orientation:   Children: 1-3yo  Housing Status:  with , baby, mom     history:  NO  Access to gun: YES       Psychiatric Mental Status Exam:  Arousal: alert  Sensorium/Orientation: oriented to grossly intact  Behavior/Cooperation: normal, cooperative   Speech: normal tone, normal rate, normal pitch, normal volume  Language: grossly intact  Mood: " not the same "   Affect: blunted  Thought Process: normal and logical  Thought Content:   Auditory hallucinations: NO  Visual hallucinations: NO  Paranoia: NO  Delusions:  NO  Suicidal ideation: NO  Homicidal ideation: NO  Attention/Concentration:  intact  Memory:    Recent:  Intact   Remote: Intact  Insight: has awareness of illness  Judgment: behavior is adequate to circumstances      Past Medical History:   Past Medical History:   Diagnosis Date    Abnormal Pap smear     Abnormal Pap smear of vagina     Anemia     Anxiety     Hypertension       Laboratory Data:   Labs Reviewed   CBC W/ AUTO DIFFERENTIAL - Abnormal; Notable for the following components:       Result Value    WBC 15.11 (*)     Hemoglobin 9.9 (*)     Hematocrit 34.5 (*)     MCV 75 (*)     MCH 21.5 (*)     MCHC 28.7 (*)     RDW 21.4 (*)     Platelets 122 (*)     Gran % 87.0 (*)     Lymph % 1.0 (*)     " Platelet Estimate Decreased (*)     All other components within normal limits    Narrative:     Release to patient->Immediate   COMPREHENSIVE METABOLIC PANEL - Abnormal; Notable for the following components:    Sodium 134 (*)     Potassium 3.1 (*)     CO2 <5 (*)     Glucose 171 (*)     Total Protein 9.1 (*)     All other components within normal limits    Narrative:     Release to patient->Immediate    CO2 critical result(s) called and verbal readback obtained from   vladimir miles rn ed by [facility] 06/13/2023 23:24   DRUG SCREEN PANEL, URINE EMERGENCY - Abnormal; Notable for the following components:    Opiate Scrn, Ur Presumptive Positive (*)     All other components within normal limits    Narrative:     Specimen Source->Urine   D DIMER, QUANTITATIVE - Abnormal; Notable for the following components:    D-Dimer 8.38 (*)     All other components within normal limits   SALICYLATE LEVEL - Abnormal; Notable for the following components:    Salicylate Lvl <5.0 (*)     All other components within normal limits   ACETAMINOPHEN LEVEL - Abnormal; Notable for the following components:    Acetaminophen (Tylenol), Serum <3.0 (*)     All other components within normal limits   LACTIC ACID, PLASMA - Abnormal; Notable for the following components:    Lactate (Lactic Acid) 2.3 (*)     All other components within normal limits   COMPREHENSIVE METABOLIC PANEL - Abnormal; Notable for the following components:    CO2 <5 (*)     Glucose 164 (*)     Total Protein 8.8 (*)     All other components within normal limits    Narrative:      CO2 result(s) called and verbal readback obtained from Maryse Cat RN by [facility] 06/14/2023 01:18   LACTIC ACID, PLASMA - Abnormal; Notable for the following components:    Lactate (Lactic Acid) 3.9 (*)     All other components within normal limits    Narrative:      LA result(s) called and verbal readback obtained from Vladimir Miles RN by [facility] 06/14/2023 02:18   BETA - HYDROXYBUTYRATE, SERUM - Abnormal;  Notable for the following components:    Beta-Hydroxybutyrate 1.3 (*)     All other components within normal limits   ISTAT PROCEDURE - Abnormal; Notable for the following components:    POC PH 7.065 (*)     POC PCO2 9.8 (*)     POC PO2 108 (*)     POC HCO3 2.8 (*)     All other components within normal limits   ISTAT PROCEDURE - Abnormal; Notable for the following components:    POC PH 7.064 (*)     POC PCO2 9.0 (*)     POC PO2 106 (*)     POC HCO3 2.6 (*)     All other components within normal limits   POCT GLUCOSE - Abnormal; Notable for the following components:    POCT Glucose 190 (*)     All other components within normal limits   HIV 1 / 2 ANTIBODY    Narrative:     Release to patient->Immediate   HEPATITIS C ANTIBODY    Narrative:     Release to patient->Immediate   HEP C VIRUS HOLD SPECIMEN    Narrative:     Release to patient->Immediate   TSH    Narrative:     Release to patient->Immediate   TROPONIN I   TROPONIN I    Narrative:     Release to patient->Immediate   ALCOHOL,MEDICAL (ETHANOL)   URINALYSIS, REFLEX TO URINE CULTURE   HEMOGLOBIN A1C   IRON AND TIBC   BETA - HYDROXYBUTYRATE, SERUM   URINALYSIS, REFLEX TO URINE CULTURE   POCT GLUCOSE MONITORING CONTINUOUS       Neurological History:  Seizures: No  Head trauma: No    Allergies:  Review of patient's allergies indicates:   Allergen Reactions    Latex Rash    Latex, natural rubber Rash       Medications in ER:   Medications   hydrALAZINE injection 20 mg (20 mg Intravenous Not Given 6/14/23 0130)   mupirocin 2 % ointment ( Nasal Given 6/15/23 2123)   enoxaparin injection 40 mg (40 mg Subcutaneous Given 6/15/23 1659)   azithromycin (ZITHROMAX) 500 mg in dextrose 5 % (D5W) 250 mL IVPB (Vial-Mate) (0 mg Intravenous Stopped 6/15/23 0225)   glucagon (human recombinant) injection 1 mg (has no administration in time range)   dextrose 10% bolus 125 mL 125 mL (has no administration in time range)   dextrose 10% bolus 250 mL 250 mL (has no administration in time  range)   insulin aspart U-100 pen 1-10 Units (has no administration in time range)   etomidate (AMIDATE) 2 mg/mL injection (  Not Given 6/14/23 0215)   succinylcholine (ANECTINE) 20 mg/mL injection (  Not Given 6/14/23 0215)   ondansetron injection 4 mg (has no administration in time range)   senna-docusate 8.6-50 mg per tablet 2 tablet (2 tablets Per OG tube Not Given 6/15/23 2100)   famotidine 40 mg/5 mL (8 mg/mL) suspension 20 mg (20 mg Per OG tube Given 6/15/23 2123)   fomepizole (ANTIZOL) 690 mg in dextrose 5 % (D5W) 100 mL IVPB (0 mg Intravenous Stopped 6/15/23 1447)   potassium chloride 40 mEq in 100 mL IVPB (FOR CENTRAL LINE ADMINISTRATION ONLY) (has no administration in time range)     And   potassium chloride 20 mEq in 100 mL IVPB (FOR CENTRAL LINE ADMINISTRATION ONLY) (has no administration in time range)     And   potassium chloride 40 mEq in 100 mL IVPB (FOR CENTRAL LINE ADMINISTRATION ONLY) (has no administration in time range)   magnesium sulfate 2g in water 50mL IVPB (premix) (0 g Intravenous Stopped 6/15/23 1131)   magnesium sulfate 2g in water 50mL IVPB (premix) ( Intravenous Canceled Entry 6/15/23 0930)   acetaminophen oral solution 650 mg (has no administration in time range)   thiamine (B-1) 500 mg in dextrose 5 % (D5W) 100 mL IVPB (0 mg Intravenous Stopped 6/15/23 1858)     Followed by   thiamine (B-1) 100 mg in dextrose 5 % (D5W) 100 mL IVPB (has no administration in time range)   multivit-min-ferrous gluconate 9 mg iron/15 mL oral liquid 15 mL (15 mLs Per OG tube Given 6/15/23 0950)   folic acid 5 mg in dextrose 5 % (D5W) 250 mL IVPB (5 mg Intravenous New Bag 6/16/23 0018)   lactated ringers infusion ( Intravenous Verify Only 6/15/23 2300)   metoprolol tartrate (LOPRESSOR) tablet 25 mg (25 mg Oral Given 6/15/23 2123)   cefTRIAXone (ROCEPHIN) 1 g in dextrose 5 % in water (D5W) 5 % 100 mL IVPB (MB+) (0 g Intravenous Stopped 6/15/23 1729)   LORazepam injection 0.5 mg (0.5 mg Intravenous Given  6/13/23 1414)   sodium chloride 0.9% bolus 1,000 mL 1,000 mL (0 mLs Intravenous Stopped 6/13/23 1517)   potassium chloride SA CR tablet 40 mEq (40 mEq Oral Given 6/13/23 1603)   labetaloL injection 10 mg (10 mg Intravenous Given 6/13/23 1604)   morphine injection 2 mg (2 mg Intravenous Given 6/13/23 1854)   LORazepam injection 1 mg (1 mg Intravenous Given 6/13/23 2000)   gadobutroL (GADAVIST) injection 10 mL (6 mLs Intravenous Given 6/13/23 2035)   sodium chloride 0.9% bolus 1,000 mL 1,000 mL (0 mLs Intravenous Stopped 6/14/23 0115)   sodium bicarbonate solution 50 mEq (50 mEq Intravenous Given 6/13/23 2240)   iohexoL (OMNIPAQUE 350) injection 100 mL (100 mLs Intravenous Given 6/13/23 2340)   ceFEPIme (MAXIPIME) 2 g in dextrose 5 % in water (D5W) 5 % 100 mL IVPB (MB+) (0 g Intravenous Stopped 6/14/23 0206)   sodium chloride 0.9% bolus 1,000 mL 1,000 mL (0 mLs Intravenous Stopped 6/14/23 0303)   sodium bicarbonate solution 50 mEq (50 mEq Intravenous Given 6/14/23 0137)   LORazepam injection 0.5 mg (0.5 mg Intravenous Given 6/14/23 0157)   sodium bicarbonate solution 150 mEq (150 mEq Intravenous Given 6/14/23 0552)   vancomycin 1,500 mg in dextrose 5 % (D5W) 250 mL IVPB (Vial-Mate) (0 mg Intravenous Stopped 6/14/23 1103)   fomepizole (ANTIZOL) 1,040 mg in dextrose 5 % (D5W) 100 mL IVPB (0 mg Intravenous Stopped 6/14/23 1417)   potassium chloride 10 mEq in 100 mL IVPB (10 mEq Intravenous New Bag 6/14/23 1809)   potassium bicarbonate disintegrating tablet 50 mEq (50 mEq Per OG tube Given 6/14/23 1251)   metoprolol injection 2.5 mg (2.5 mg Intravenous Given 6/14/23 1339)   magnesium sulfate 2g in water 50mL IVPB (premix) (0 g Intravenous Stopped 6/14/23 1754)   midazolam (VERSED) 1 mg/mL injection 2 mg (2 mg Intravenous Given 6/14/23 1736)   potassium chloride 40 mEq in 100 mL IVPB (FOR CENTRAL LINE ADMINISTRATION ONLY) (0 mEq Intravenous Stopped 6/15/23 7998)   potassium bicarbonate disintegrating tablet 40 mEq (40 mEq  Per OG tube Given 6/15/23 0302)   phenylephrine HCl in 0.9% NaCl 1 mg/10 mL (100 mcg/mL) 200 mcg (200 mcg Intravenous Given by Provider 6/15/23 3867)   potassium phosphate 30 mmol in dextrose 5 % (D5W) 500 mL infusion (0 mmol Intravenous Stopped 6/15/23 1733)   metoprolol injection 2.5 mg (2.5 mg Intravenous Given 6/15/23 1442)       Medications at home: none    No new subjective & objective note has been filed under this hospital service since the last note was generated.      Assessment - Diagnosis - Goals:     Diagnosis/Impression: Patient is a 37yoF with no significant past psychiatric history. Patient says that work has been stressful due to the workload. She says she had noted anxiety and planned to see her PCP to get started on anxiety medication. Patient denies any knowledge of the causes of her symptoms. She specifically denies any ingestions. Patient denies any current or history of SI or suicide attempts. Attempted to call her  but unable to connect. Would recommend gaining collateral from  about any concerns for ingestion/suicide attempt. At this time, would not recommend PEC or inpatient psychiatric hospitalization, but that recommendation could be altered based upon collateral garnered from her .    Rec:   1. Dispo/Legal Status:  Pt does not meet criteria for PEC or inpt psych admit at this time. Pt is not currently an imminent danger to self or others and is not gravely disabled due to an acute psych illness.  2. Medication Recommendations:   Could offer escitalopram 10mg daily to target anxiety  3. Follow-up: PCP  4. Return to ED: any true SI/HI or any other acute changes to mental status  5. Case Discussed With: [facility] medicine team       Time with patient: 23 minutes  In total, 38 minutes were spent on chart review, discussion with ED, patient interview and charting    More than 50% of the time was spent counseling/coordinating care    Consulting clinician was  informed of the encounter and consult note.    Consultation ended: 6/16/2023 at 1600    Kendy Wu MD   Psychiatry  [facility]

## 2023-06-16 NOTE — PT/OT/SLP EVAL
"Physical Therapy Evaluation    Patient Name:  Shania Tapia   MRN:  [id number]    Recommendations:     Discharge Recommendations: rehabilitation facility   Discharge Equipment Recommendations:  (TBD)   Barriers to discharge: None    Assessment:     Shania Tapia is a 37 y.o. female admitted with a medical diagnosis of Metabolic acidosis.  She presents with the following impairments/functional limitations: weakness, impaired endurance, impaired functional mobility, gait instability, impaired balance, decreased coordination, decreased safety awareness, impaired cognition which limits mobility and increases caregiver burden. Pt with lingering confusion, motor planning deficits and gross ataxia which limited ability to participate in gait activity safely. Pt will benefit from continued PT services in order to progress toward baseline.    Rehab Prognosis: Good; patient would benefit from acute skilled PT services to address these deficits and reach maximum level of function.    Recent Surgery: * No surgery found *      Plan:     During this hospitalization, patient to be seen 3 x/week to address the identified rehab impairments via gait training, therapeutic activities, therapeutic exercises, neuromuscular re-education and progress toward the following goals:    Plan of Care Expires:  06/30/23    Subjective     Chief Complaint: metabolic acidosis  Patient/Family Comments/goals: to go home/get better "it's my brain" when asked about forward head posture  Pain/Comfort:  Pain Rating 1: 0/10  Pain Rating Post-Intervention 1: 0/10    Patients cultural, spiritual, Worship conflicts given the current situation: no    Living Environment:  Pt lives in [facility] with spouse, mother and 4 yr old daughter  Prior to admission, patients level of function was independent with ADLs, community and household mobility. Works as a  and was driving.  Equipment used at home: none.  DME owned (not currently used): " none.  Upon discharge, patient will have assistance from family.    Objective:     Communicated with nursing (Cheryl) and performed chart review via epic prior to session.  Patient found supine with peripheral IV, telemetry, oxygen, blood pressure cuff, pulse ox (continuous), braxton catheter, central line  upon PT entry to room.    General Precautions: Standard, fall  Orthopedic Precautions:N/A   Braces: N/A  Respiratory Status: Nasal cannula, flow 3 L/min    Exams:  Cognitive Exam:  Patient is oriented to Person and Place, pt required prompting for time  Gross Motor Coordination:  impaired  Postural Exam:  Patient presented with the following abnormalities:    -       Rounded shoulders  -       Forward head  RLE ROM: WFL  RLE Strength: WFL  LLE ROM: WFL  LLE Strength: WFL  Numbness reported throughout legs  Gross ataxia, decreased motor coordination    Functional Mobility:  Bed Mobility:     Scooting: minimum assistance  Supine to Sit: minimum assistance  Transfers:     Sit to Stand:  maximal assistance and of 2 persons with hand-held assist  Bed to Chair: maximal assistance and of 2 persons with  hand-held assist  using  Stand Pivot  Gait: unable, knee buckling and ataxia  Balance: poor minus dynamic standing balance      AM-PAC 6 CLICK MOBILITY  Total Score:11       Treatment & Education:  Educated pt on benefits of consistent participation in PT services to meet functional goals. Educated pt on seated therex to promote strength and joint mobility. Exercises included AP, LAQ, marching. Educated on performing exercises slowly to isolate desired motion for optimal results. Educated to perform exercises intermittently throughout day to tolerance. Educated pt on importance of sitting OOB to promote endurance and overall activity tolerance. Educated pt on call don't fall policy and use of call button to alert nursing staff of needs (including to assist with returning back to bed). Pt expressed understanding.       Patient left up in chair with all lines intact, call button in reach, chair alarm in place, nursing notified, and family present.    GOALS:   Multidisciplinary Problems       Physical Therapy Goals          Problem: Physical Therapy    Goal Priority Disciplines Outcome Goal Variances Interventions   Physical Therapy Goal     PT, PT/OT      Description: Pt will perform bed mobility independently in order to participate in EOB activity.  Pt will perform transfers independently in order to participate in OOB activity.   Pt will ambulate 150ft mod I with LRAD in order to participate in daily tasks.                         History:     Past Medical History:   Diagnosis Date    Abnormal Pap smear     Abnormal Pap smear of vagina     Anemia     Anxiety     Hypertension        Past Surgical History:   Procedure Laterality Date    CERVICAL BIOPSY      Conization        Time Tracking:     PT Received On: 06/16/23  PT Start Time: 0900     PT Stop Time: 0930  PT Total Time (min): 30 min     Billable Minutes: Evaluation 10 and Therapeutic Activity 20 06/16/2023

## 2023-06-16 NOTE — PLAN OF CARE
EVAL AND TX COMPLETED: facilitated bed mobility with min A, transfers with max A x 2. Unable to ambulate due to increased weakness and ataxia. Recommend rehab

## 2023-06-16 NOTE — ASSESSMENT & PLAN NOTE
Patient required emergent intubation for airway protection in ED  Successfully extubated on 06/15/2023 and transitioned to nasal cannula  Wean O2 as able   Continue treatment for pneumonia

## 2023-06-16 NOTE — PT/OT/SLP EVAL
Occupational Therapy Evaluation and Treatment    Name: Shania Tapia  MRN: [id number]  Admitting Diagnosis: Metabolic acidosis  Recent Surgery: * No surgery found *      Recommendations:     Discharge Recommendations: rehabilitation facility  Level of Assistance Recommended: 24 hours significant assistance  Discharge Equipment Recommendations: to be determined by next level of care  Barriers to discharge: None    Assessment:     Shania Tapia is a 37 y.o. female with a medical diagnosis of Metabolic acidosis. She presents with performance deficits affecting function including weakness, impaired endurance, impaired self care skills, impaired functional mobility, impaired balance, impaired cardiopulmonary response to activity, decreased safety awareness, decreased lower extremity function, decreased upper extremity function, decreased coordination, impaired cognition, impaired coordination, impaired fine motor.    Rehab Prognosis: Good; patient would benefit from acute OT services to address these deficits and reach maximum level of function.    Pt would benefit from intensive therapies in an inpatient setting with 3 hours of therapy/day. Pt's needs cannot be met at a lower level of care. Pt is motivated to progress. Rehabilitation facility recommended to return patient to PLOF, prevent future falls and decrease readmission risk.     Plan:     Patient to be seen 2 x/week to address the above listed problems via self-care/home management, therapeutic activities, therapeutic exercises, neuromuscular re-education  Plan of Care Expires: 06/30/23  Plan of Care Reviewed with: patient, spouse, mother    Subjective     Chief Complaint: Minimal verbalizations throughout  Patient Comments/Goals: regain independence  Pain/Comfort:  Pain Rating 1: 0/10    Social History:  Living Environment: Patient lives with their spouse and child(jose juan) in a single story home with number of outside stair(s): threshold to  enter  Prior Level of Function: Prior to admission, patient was independent with ADLs and community distance ambulation.  Roles and Routines: Patient was driving and working prior to admission.  Equipment Used at Home: none  DME owned (not currently used): none  Assistance Upon Discharge: family    Objective:     Communicated with nurseCheryl, prior to session. Patient found supine with blood pressure cuff, pulse ox (continuous), telemetry, braxton catheter, peripheral IV, oxygen (R vas cath) upon OT entry to room.    General Precautions: Standard, fall   Orthopedic Precautions: N/A   Braces: N/A    Respiratory Status: Nasal cannula, flow 2 L/min. Increased to 3L after transfer to chair to maintain O2 sats > 88%. Nurse present and aware.    Occupational Performance    Gait belt applied - Yes    Bed Mobility:   Supine to sit from right side of bed with minimum assistance    Functional Mobility/Transfers:  Sit <> Stand Transfer with maximal assistance and 2 persons with no AD  Bed <> Chair Transfer using Stand Pivot technique with maximal assistance and 2 persons with no AD  Functional Mobility: unable to complete at this time    Activities of Daily Living:  Upper Body Dressing: moderate assistance  Lower Body Dressing: total assistance    Cognitive/Visual Perceptual:  Cognitive/Psychosocial Skills: -     Oriented to: Person and Place  -     Follows Commands/attention: inconsistently following 1 step commands  -     Communication: aphasia  -     Mood/Affect/Coping skills/emotional control: Flat affect    Physical Exam:  Balance:    -     Sitting: contact guard assistance  -     Standing: maximal assistance and of 2 persons  Dominant hand: Right  Upper Extremity Range of Motion:     -       Right Upper Extremity: WFL  -       Left Upper Extremity: WFL  Upper Extremity Strength:    -       Right Upper Extremity: Deficits: grossly 4-/5  -       Left Upper Extremity: Deficits: grossly 4-/5   Strength:    -       Right  Upper Extremity: Deficits: poor  -       Left Upper Extremity: Deficits: poor  Fine Motor Coordination:    -       Impaired  Left hand, finger to nose, Right hand, finger to nos, Left hand thumb/finger opposition skills, and Right hand thumb/finger opposition skills    AMPAC 6 Click ADL:  AMPAC Total Score: 6    Treatment & Education:  Therapist provided facilitation and instruction of proper body mechanics, energy conservation, and fall prevention strategies during tasks listed above  Patient educated on role of OT, POC, and goals for therapy  Patient educated on importance of OOB activities with staff member assistance and sitting OOB majority of the day  Encouraged completion of B UE AROM therex throughout the day to increase functional strength and activity tolerance needed for ADL completion. Provided with education on need to decrease speed of movement to improve quality of completion.    Patient left up in chair with all lines intact, call button in reach, RN notified, and chair alarm on.    GOALS:   Multidisciplinary Problems       Occupational Therapy Goals          Problem: Occupational Therapy    Goal Priority Disciplines Outcome Interventions   Occupational Therapy Goal     OT, PT/OT     Description: Goals to be met by: 6/30/23     Patient will increase functional independence with ADLs by performing:    Toileting from toilet with Minimal Assistance for hygiene and clothing management.   Toilet transfer to toilet with Minimal Assistance.  Increased functional strength in B UE grossly by 1/2 MM grade.                         History:     Past Medical History:   Diagnosis Date    Abnormal Pap smear     Abnormal Pap smear of vagina     Anemia     Anxiety     Hypertension          Past Surgical History:   Procedure Laterality Date    CERVICAL BIOPSY      Conization        Time Tracking:     OT Date of Treatment: 06/16/23  OT Start Time: 0835  OT Stop Time: 0900  OT Total Time (min): 25 min    Billable  Minutes: Evaluation 15 and Therapeutic Activity 10    6/16/2023

## 2023-06-16 NOTE — ASSESSMENT & PLAN NOTE
This patient does have evidence of infective focus  My overall impression is sepsis.  Source: Respiratory  Antibiotics given-   Antibiotics (72h ago, onward)    Start     Stop Route Frequency Ordered    06/15/23 1700  cefTRIAXone (ROCEPHIN) 1 g in dextrose 5 % in water (D5W) 5 % 100 mL IVPB (MB+)         06/21 1659 IV Every 24 hours (non-standard times) 06/15/23 1416    06/14/23 0900  mupirocin 2 % ointment  (DECOLONIZATION PROTOCOL ORDERS)         06/19 0859 Nasl 2 times daily 06/14/23 0122    06/14/23 0135  azithromycin (ZITHROMAX) 500 mg in dextrose 5 % (D5W) 250 mL IVPB (Vial-Mate)         06/17 0144 IV Every 24 hours (non-standard times) 06/14/23 0136        Latest lactate reviewed-  Recent Labs   Lab 06/15/23  0032 06/15/23  0543 06/15/23  0814   LACTATE 1.9 1.8 1.3     Organ dysfunction indicated by Encephalopathy    Fluid challenge Not needed - patient is not hypotensive      Post- resuscitation assessment No - Post resuscitation assessment not needed       Will Not start Pressors- Levophed for MAP of 65  Source control achieved by: IV antibiotics

## 2023-06-16 NOTE — PROCEDURES
"Shania Tapia is a 37 y.o. female patient.    Temp: 98.8 °F (37.1 °C) (06/16/23 1238)  Pulse: (!) 120 (06/16/23 1238)  Resp: 18 (06/16/23 1238)  BP: (!) 139/93 (06/16/23 1238)  SpO2: (!) 93 % (06/16/23 1238)  Weight: 68.4 kg (150 lb 12.7 oz) (06/16/23 0500)  Height: 5' 1" (154.9 cm) (06/15/23 0455)    PICC  Date/Time: 6/16/2023 1:07 PM  Performed by: Mango Hernandez RN  Supervising provider: Carroll Martins RN  Consent Done: Yes  Time out: Immediately prior to procedure a time out was called to verify the correct patient, procedure, equipment, support staff and site/side marked as required  Indications: med administration and vascular access  Anesthesia: local infiltration  Local anesthetic: lidocaine 1% without epinephrine  Anesthetic Total (mL): 3  Preparation: skin prepped with ChloraPrep  Skin prep agent dried: skin prep agent completely dried prior to procedure  Sterile barriers: all five maximum sterile barriers used - cap, mask, sterile gown, sterile gloves, and large sterile sheet  Hand hygiene: hand hygiene performed prior to central venous catheter insertion  Location details: left basilic  Catheter type: double lumen  Catheter size: 5 Fr  Catheter Length: 40cm    Ultrasound guidance: yes  Vessel Caliber: medium and patent, compressibility normal  Vascular Doppler: not done  Needle advanced into vessel with real time Ultrasound guidance.  Guidewire confirmed in vessel.  Sterile sheath used.  no esophageal manometryNumber of attempts: 1  Post-procedure: blood return through all ports, chlorhexidine patch and sterile dressing applied  Estimated blood loss (mL): 0  Specimens: No  Implants: Yes  Assessment: placement verified by x-ray  Complications: none        Name Mango Hernandez RN  6/16/2023    "

## 2023-06-16 NOTE — SUBJECTIVE & OBJECTIVE
Past Medical History:   Diagnosis Date    Abnormal Pap smear     Abnormal Pap smear of vagina     Anemia     Anxiety     Hypertension        Past Surgical History:   Procedure Laterality Date    CERVICAL BIOPSY      Conization        Review of patient's allergies indicates:   Allergen Reactions    Latex Rash    Latex, natural rubber Rash       No current facility-administered medications on file prior to encounter.     Current Outpatient Medications on File Prior to Encounter   Medication Sig    hydroCHLOROthiazide (HYDRODIURIL) 12.5 MG Tab Take 1 tablet (12.5 mg total) by mouth daily as needed (swelling).    NIFEdipine (PROCARDIA-XL) 30 MG (OSM) 24 hr tablet TAKE 1 TABLET BY MOUTH EVERY DAY     Family History       Problem Relation (Age of Onset)    Diabetes Paternal Grandmother    Heart attack Father    Hypertension Father          Tobacco Use    Smoking status: Never    Smokeless tobacco: Never   Substance and Sexual Activity    Alcohol use: No    Drug use: No    Sexual activity: Yes     Partners: Male     Birth control/protection: None     Review of Systems   Constitutional:  Negative for chills and fever.   Eyes:  Positive for visual disturbance.        Blurriness improving   Respiratory:  Negative for cough, shortness of breath and wheezing.    Cardiovascular:  Negative for chest pain and palpitations.   Gastrointestinal:  Negative for abdominal pain, constipation, diarrhea, nausea and vomiting.   Neurological:  Negative for dizziness and headaches.   All other systems reviewed and are negative.  Objective:     Vital Signs (Most Recent):  Temp: 98.4 °F (36.9 °C) (06/15/23 1600)  Pulse: (!) 118 (06/15/23 1909)  Resp: (!) 21 (06/15/23 1909)  BP: (!) 137/92 (06/15/23 1800)  SpO2: 100 % (06/15/23 1909) Vital Signs (24h Range):  Temp:  [98.3 °F (36.8 °C)-101.1 °F (38.4 °C)] 98.4 °F (36.9 °C)  Pulse:  [102-144] 118  Resp:  [10-32] 21  SpO2:  [87 %-100 %] 100 %  BP: ()/(52-92) 137/92  Arterial Line BP:  ()/() 147/79     Weight: 69.1 kg (152 lb 5.4 oz)  Body mass index is 28.78 kg/m².     Physical Exam  Vitals and nursing note reviewed. Exam conducted with a chaperone present.   Constitutional:       General: She is not in acute distress.     Appearance: She is not ill-appearing.   HENT:      Head: Normocephalic and atraumatic.      Right Ear: External ear normal.      Left Ear: External ear normal.      Nose: Nose normal.      Mouth/Throat:      Mouth: Mucous membranes are moist.   Eyes:      Extraocular Movements: Extraocular movements intact.      Pupils: Pupils are equal, round, and reactive to light.   Cardiovascular:      Rate and Rhythm: Regular rhythm. Tachycardia present.   Pulmonary:      Effort: Pulmonary effort is normal. No accessory muscle usage or respiratory distress.      Breath sounds: Examination of the right-upper field reveals rhonchi. Rhonchi present. No wheezing.   Abdominal:      General: Bowel sounds are normal. There is no distension.      Palpations: Abdomen is soft.      Tenderness: There is no abdominal tenderness. There is no guarding or rebound.   Genitourinary:     Comments: Little catheter in place  Musculoskeletal:      Cervical back: Neck supple.      Right lower leg: No edema.      Left lower leg: No edema.   Skin:     General: Skin is warm and dry.   Neurological:      Mental Status: She is alert and oriented to person, place, and time.      Cranial Nerves: No cranial nerve deficit.      Motor: No weakness.   Psychiatric:         Attention and Perception: She perceives visual hallucinations.         Mood and Affect: Mood is elated.         Behavior: Behavior is cooperative.      Comments: Noted to be picking on her gown        Significant Labs: All pertinent labs within the past 24 hours have been reviewed.  CBC:   Recent Labs   Lab 06/14/23  0431 06/15/23  0543   WBC 25.53* 10.62   HGB 9.8* 8.4*   HCT 35.6* 27.6*   * 104*     CMP:   Recent Labs   Lab  06/14/23  0046 06/14/23  0432 06/14/23  2053 06/15/23  0032 06/15/23  0543      < > 141 138 138   K 3.8   < > 3.0* 2.8* 3.3*      < > 97 94* 95   CO2 <5*   < > 29 30* 30*   *   < > 82 98 105   BUN 7   < > 3* 4* 7   CREATININE 0.9   < > 0.4* 0.6 0.8   CALCIUM 9.1   < > 8.4* 8.7 8.3*   PROT 8.8*  --   --   --  5.9*   ALBUMIN 4.0  --   --   --  2.4*   BILITOT 0.6  --   --   --  1.4*   ALKPHOS 106  --   --   --  85   AST 31  --   --   --  28   ALT 23  --   --   --  14   ANIONGAP Unable to calculate   < > 15 14 13    < > = values in this interval not displayed.     Magnesium:   Recent Labs   Lab 06/14/23  1155 06/15/23  0543   MG 1.4* 1.5*       Significant Imaging: I have reviewed all pertinent imaging results/findings within the past 24 hours.

## 2023-06-16 NOTE — ASSESSMENT & PLAN NOTE
Large right upper lobe opacity favoring pneumonia  Empirically started on vancomycin, cefepime and azithromycin initially  Endotracheal aspirate sent for culture, we will follow  Transitioned to ceftriaxone and azithromycin  Leukocytosis improving

## 2023-06-17 LAB
ACETONE SERPL-MCNC: NOT DETECTED MG/DL
ALBUMIN SERPL BCP-MCNC: 2.5 G/DL (ref 3.5–5.2)
ALP SERPL-CCNC: 121 U/L (ref 55–135)
ALT SERPL W/O P-5'-P-CCNC: 27 U/L (ref 10–44)
ANION GAP SERPL CALC-SCNC: 11 MMOL/L (ref 8–16)
ANION GAP SERPL CALC-SCNC: 11 MMOL/L (ref 8–16)
AST SERPL-CCNC: 58 U/L (ref 10–40)
BASOPHILS # BLD AUTO: 0.02 K/UL (ref 0–0.2)
BASOPHILS NFR BLD: 0.3 % (ref 0–1.9)
BILIRUB DIRECT SERPL-MCNC: 0.3 MG/DL (ref 0.1–0.3)
BILIRUB SERPL-MCNC: 0.5 MG/DL (ref 0.1–1)
BUN SERPL-MCNC: 7 MG/DL (ref 6–20)
BUN SERPL-MCNC: 9 MG/DL (ref 6–20)
CALCIUM SERPL-MCNC: 8.6 MG/DL (ref 8.7–10.5)
CALCIUM SERPL-MCNC: 8.8 MG/DL (ref 8.7–10.5)
CHLORIDE SERPL-SCNC: 104 MMOL/L (ref 95–110)
CHLORIDE SERPL-SCNC: 105 MMOL/L (ref 95–110)
CO2 SERPL-SCNC: 30 MMOL/L (ref 23–29)
CO2 SERPL-SCNC: 32 MMOL/L (ref 23–29)
CREAT SERPL-MCNC: 0.8 MG/DL (ref 0.5–1.4)
CREAT SERPL-MCNC: 0.8 MG/DL (ref 0.5–1.4)
DIFFERENTIAL METHOD: ABNORMAL
EOSINOPHIL # BLD AUTO: 0 K/UL (ref 0–0.5)
EOSINOPHIL NFR BLD: 0.5 % (ref 0–8)
ERYTHROCYTE [DISTWIDTH] IN BLOOD BY AUTOMATED COUNT: 20.6 % (ref 11.5–14.5)
EST. GFR  (NO RACE VARIABLE): >60 ML/MIN/1.73 M^2
EST. GFR  (NO RACE VARIABLE): >60 ML/MIN/1.73 M^2
ETHANOL SERPL-MCNC: NOT DETECTED MG/DL
GLUCOSE SERPL-MCNC: 120 MG/DL (ref 70–110)
GLUCOSE SERPL-MCNC: 98 MG/DL (ref 70–110)
HCT VFR BLD AUTO: 28.7 % (ref 37–48.5)
HGB BLD-MCNC: 8.7 G/DL (ref 12–16)
HYPOCHROMIA BLD QL SMEAR: ABNORMAL
IMM GRANULOCYTES # BLD AUTO: 0.08 K/UL (ref 0–0.04)
IMM GRANULOCYTES NFR BLD AUTO: 1 % (ref 0–0.5)
ISOPROPANOL SERPL-MCNC: NOT DETECTED MG/DL
LYMPHOCYTES # BLD AUTO: 0.9 K/UL (ref 1–4.8)
LYMPHOCYTES NFR BLD: 11.5 % (ref 18–48)
MAGNESIUM SERPL-MCNC: 2 MG/DL (ref 1.6–2.6)
MCH RBC QN AUTO: 21.7 PG (ref 27–31)
MCHC RBC AUTO-ENTMCNC: 30.3 G/DL (ref 32–36)
MCV RBC AUTO: 72 FL (ref 82–98)
METHANOL SERPL-MCNC: 123 MG/DL
METHANOL SERPL-MCNC: 90 MG/DL
MONOCYTES # BLD AUTO: 1 K/UL (ref 0.3–1)
MONOCYTES NFR BLD: 12.2 % (ref 4–15)
NEUTROPHILS # BLD AUTO: 5.9 K/UL (ref 1.8–7.7)
NEUTROPHILS NFR BLD: 74.5 % (ref 38–73)
NRBC BLD-RTO: 0 /100 WBC
PHOSPHATE SERPL-MCNC: 1.8 MG/DL (ref 2.7–4.5)
PHOSPHATE SERPL-MCNC: 2.9 MG/DL (ref 2.7–4.5)
PLATELET # BLD AUTO: 124 K/UL (ref 150–450)
PLATELET BLD QL SMEAR: ABNORMAL
PMV BLD AUTO: ABNORMAL FL (ref 9.2–12.9)
POCT GLUCOSE: 97 MG/DL (ref 70–110)
POTASSIUM SERPL-SCNC: 2.7 MMOL/L (ref 3.5–5.1)
POTASSIUM SERPL-SCNC: 3 MMOL/L (ref 3.5–5.1)
PROCALCITONIN SERPL IA-MCNC: 1.64 NG/ML
PROT SERPL-MCNC: 6.4 G/DL (ref 6–8.4)
RBC # BLD AUTO: 4.01 M/UL (ref 4–5.4)
SODIUM SERPL-SCNC: 145 MMOL/L (ref 136–145)
SODIUM SERPL-SCNC: 148 MMOL/L (ref 136–145)
STOMATOCYTES BLD QL SMEAR: PRESENT
VOLATILE SCREEN SERUM, CHAIN OF CUSTODY: ABNORMAL
VOLATILES SERPL: ABNORMAL
WBC # BLD AUTO: 7.93 K/UL (ref 3.9–12.7)

## 2023-06-17 PROCEDURE — G0406 PR TELHEALTH INPT CONSULT 15MIN: ICD-10-PCS | Mod: 95,,, | Performed by: PSYCHIATRY & NEUROLOGY

## 2023-06-17 PROCEDURE — 63600175 PHARM REV CODE 636 W HCPCS: Performed by: NURSE PRACTITIONER

## 2023-06-17 PROCEDURE — 94761 N-INVAS EAR/PLS OXIMETRY MLT: CPT

## 2023-06-17 PROCEDURE — 84100 ASSAY OF PHOSPHORUS: CPT | Performed by: INTERNAL MEDICINE

## 2023-06-17 PROCEDURE — 99232 PR SUBSEQUENT HOSPITAL CARE,LEVL II: ICD-10-PCS | Mod: ,,, | Performed by: INTERNAL MEDICINE

## 2023-06-17 PROCEDURE — 97530 THERAPEUTIC ACTIVITIES: CPT | Mod: CQ

## 2023-06-17 PROCEDURE — 63600175 PHARM REV CODE 636 W HCPCS: Performed by: INTERNAL MEDICINE

## 2023-06-17 PROCEDURE — 25000003 PHARM REV CODE 250: Performed by: INTERNAL MEDICINE

## 2023-06-17 PROCEDURE — 80076 HEPATIC FUNCTION PANEL: CPT | Performed by: NURSE PRACTITIONER

## 2023-06-17 PROCEDURE — 83735 ASSAY OF MAGNESIUM: CPT | Performed by: NURSE PRACTITIONER

## 2023-06-17 PROCEDURE — 99900035 HC TECH TIME PER 15 MIN (STAT)

## 2023-06-17 PROCEDURE — 25000003 PHARM REV CODE 250: Performed by: STUDENT IN AN ORGANIZED HEALTH CARE EDUCATION/TRAINING PROGRAM

## 2023-06-17 PROCEDURE — 84145 PROCALCITONIN (PCT): CPT | Performed by: NURSE PRACTITIONER

## 2023-06-17 PROCEDURE — 21400001 HC TELEMETRY ROOM

## 2023-06-17 PROCEDURE — 82300 ASSAY OF CADMIUM: CPT | Performed by: STUDENT IN AN ORGANIZED HEALTH CARE EDUCATION/TRAINING PROGRAM

## 2023-06-17 PROCEDURE — 84100 ASSAY OF PHOSPHORUS: CPT | Mod: 91 | Performed by: INTERNAL MEDICINE

## 2023-06-17 PROCEDURE — 80048 BASIC METABOLIC PNL TOTAL CA: CPT | Mod: 91 | Performed by: INTERNAL MEDICINE

## 2023-06-17 PROCEDURE — G0406 INPT/TELE FOLLOW UP 15: HCPCS | Mod: 95,,, | Performed by: PSYCHIATRY & NEUROLOGY

## 2023-06-17 PROCEDURE — 99232 SBSQ HOSP IP/OBS MODERATE 35: CPT | Mod: ,,, | Performed by: INTERNAL MEDICINE

## 2023-06-17 PROCEDURE — 80048 BASIC METABOLIC PNL TOTAL CA: CPT | Performed by: INTERNAL MEDICINE

## 2023-06-17 PROCEDURE — 92526 ORAL FUNCTION THERAPY: CPT

## 2023-06-17 PROCEDURE — 25000003 PHARM REV CODE 250: Performed by: NURSE PRACTITIONER

## 2023-06-17 PROCEDURE — 92507 TX SP LANG VOICE COMM INDIV: CPT

## 2023-06-17 PROCEDURE — 25000003 PHARM REV CODE 250: Performed by: HOSPITALIST

## 2023-06-17 PROCEDURE — 97112 NEUROMUSCULAR REEDUCATION: CPT | Mod: CQ

## 2023-06-17 PROCEDURE — 27000221 HC OXYGEN, UP TO 24 HOURS

## 2023-06-17 PROCEDURE — 85025 COMPLETE CBC W/AUTO DIFF WBC: CPT | Performed by: NURSE PRACTITIONER

## 2023-06-17 RX ORDER — LABETALOL HYDROCHLORIDE 5 MG/ML
10 INJECTION, SOLUTION INTRAVENOUS EVERY 6 HOURS PRN
Status: DISCONTINUED | OUTPATIENT
Start: 2023-06-17 | End: 2023-06-20 | Stop reason: HOSPADM

## 2023-06-17 RX ORDER — LABETALOL HCL 20 MG/4 ML
10 SYRINGE (ML) INTRAVENOUS EVERY 6 HOURS PRN
Status: DISCONTINUED | OUTPATIENT
Start: 2023-06-17 | End: 2023-06-17

## 2023-06-17 RX ORDER — TALC
6 POWDER (GRAM) TOPICAL NIGHTLY PRN
Status: DISCONTINUED | OUTPATIENT
Start: 2023-06-17 | End: 2023-06-20 | Stop reason: HOSPADM

## 2023-06-17 RX ORDER — POTASSIUM CHLORIDE 750 MG/1
10 TABLET, EXTENDED RELEASE ORAL
Status: COMPLETED | OUTPATIENT
Start: 2023-06-17 | End: 2023-06-18

## 2023-06-17 RX ADMIN — MUPIROCIN: 20 OINTMENT TOPICAL at 08:06

## 2023-06-17 RX ADMIN — POTASSIUM CHLORIDE: 149 INJECTION, SOLUTION, CONCENTRATE INTRAVENOUS at 12:06

## 2023-06-17 RX ADMIN — FAMOTIDINE 20 MG: 20 TABLET, FILM COATED ORAL at 08:06

## 2023-06-17 RX ADMIN — POTASSIUM CHLORIDE 80 MEQ: 29.8 INJECTION, SOLUTION INTRAVENOUS at 06:06

## 2023-06-17 RX ADMIN — THIAMINE HYDROCHLORIDE 100 MG: 100 INJECTION, SOLUTION INTRAMUSCULAR; INTRAVENOUS at 08:06

## 2023-06-17 RX ADMIN — METOPROLOL TARTRATE 25 MG: 25 TABLET, FILM COATED ORAL at 08:06

## 2023-06-17 RX ADMIN — POTASSIUM PHOSPHATE, MONOBASIC AND POTASSIUM PHOSPHATE, DIBASIC 15 MMOL: 224; 236 INJECTION, SOLUTION, CONCENTRATE INTRAVENOUS at 10:06

## 2023-06-17 RX ADMIN — POTASSIUM CHLORIDE 10 MEQ: 750 TABLET, EXTENDED RELEASE ORAL at 08:06

## 2023-06-17 RX ADMIN — CEFTRIAXONE 1 G: 1 INJECTION, POWDER, FOR SOLUTION INTRAMUSCULAR; INTRAVENOUS at 05:06

## 2023-06-17 RX ADMIN — SENNOSIDES AND DOCUSATE SODIUM 2 TABLET: 50; 8.6 TABLET ORAL at 08:06

## 2023-06-17 RX ADMIN — SODIUM CHLORIDE: 9 INJECTION, SOLUTION INTRAVENOUS at 08:06

## 2023-06-17 RX ADMIN — FOLIC ACID 5 MG: 5 INJECTION, SOLUTION INTRAMUSCULAR; INTRAVENOUS; SUBCUTANEOUS at 04:06

## 2023-06-17 RX ADMIN — SODIUM CHLORIDE: 9 INJECTION, SOLUTION INTRAVENOUS at 10:06

## 2023-06-17 RX ADMIN — SODIUM CHLORIDE: 9 INJECTION, SOLUTION INTRAVENOUS at 05:06

## 2023-06-17 RX ADMIN — THERA TABS 1 TABLET: TAB at 08:06

## 2023-06-17 RX ADMIN — LABETALOL HYDROCHLORIDE 10 MG: 5 INJECTION INTRAVENOUS at 01:06

## 2023-06-17 RX ADMIN — POTASSIUM CHLORIDE 10 MEQ: 750 TABLET, EXTENDED RELEASE ORAL at 05:06

## 2023-06-17 RX ADMIN — ENOXAPARIN SODIUM 40 MG: 40 INJECTION SUBCUTANEOUS at 05:06

## 2023-06-17 RX ADMIN — Medication 6 MG: at 10:06

## 2023-06-17 NOTE — PROGRESS NOTES
[facility] - Telemetry ([facility])  Nephrology  Progress Note    Patient Name: Shania Tapia  MRN: [id number]  Admission Date: 6/13/2023  Hospital Length of Stay: 3 days  Attending Provider: Tyshawn Solis MD   Primary Care Physician: Flavia Fink DO  Principal Problem:Metabolic acidosis  Reason for Consult: Metabolic Acidosis       Subjective:     History:   36 yo female admitted with profound metabolic acidosis with unmeasurable bicarb and pH 7.0, urgent dialysis. Osmolar gap noted, suspicion for alcohol and specifically methanol given vision changes     6/16  - feels vision is improving  - has no memory recall for past hospital events   - feels hungry, seen by ST and diet was recommended  - notes swelling of legs    6/17  - still with abnormal vision  - eating a little more     Review of patient's allergies indicates:   Allergen Reactions    Latex Rash    Latex, natural rubber Rash     Current Facility-Administered Medications   Medication Frequency    0.9%  NaCl infusion PRN    acetaminophen tablet 650 mg Q6H PRN    cefTRIAXone (ROCEPHIN) 1 g in dextrose 5 % in water (D5W) 5 % 100 mL IVPB (MB+) Q24H    dextrose 10% bolus 125 mL 125 mL PRN    dextrose 10% bolus 250 mL 250 mL PRN    enoxaparin injection 40 mg Daily    famotidine tablet 20 mg BID    labetaloL injection 10 mg Q6H PRN    metoprolol tartrate (LOPRESSOR) tablet 25 mg BID    multivitamin tablet Daily    mupirocin 2 % ointment BID    ondansetron injection 4 mg Q8H PRN    potassium chloride SA CR tablet 10 mEq Q4H    senna-docusate 8.6-50 mg per tablet 2 tablet BID    thiamine (B-1) 100 mg in dextrose 5 % (D5W) 100 mL IVPB Daily       Objective:     Vital Signs (Most Recent):  Temp: 97.9 °F (36.6 °C) (06/17/23 1658)  Pulse: 106 (06/17/23 1658)  Resp: 18 (06/17/23 1658)  BP: (!) 153/98 (06/17/23 1658)  SpO2: 100 % (06/17/23 1658) Vital Signs (24h Range):  Temp:  [97.5 °F (36.4 °C)-99.8 °F (37.7 °C)] 97.9 °F (36.6 °C)  Pulse:  [] 106  Resp:   [16-20] 18  SpO2:  [98 %-100 %] 100 %  BP: (137-177)/() 153/98     Weight: 68.4 kg (150 lb 12.7 oz) (06/16/23 0500)  Body mass index is 28.49 kg/m².  Body surface area is 1.72 meters squared.    I/O last 3 completed shifts:  In: 2285.3 [P.O.:340; I.V.:736.7; IV Piggyback:1208.7]  Out: 6075 [Urine:6075]    Physical Exam  Vitals and nursing note reviewed.   Constitutional:       General: She is not in acute distress.  HENT:      Head: Atraumatic.   Cardiovascular:      Rate and Rhythm: Tachycardia present.   Pulmonary:      Effort: Pulmonary effort is normal.      Breath sounds: No wheezing or rales.   Musculoskeletal:      Right lower leg: Edema (improved) present.      Left lower leg: Edema (improved) present.   Neurological:      Mental Status: She is alert.   Psychiatric:         Mood and Affect: Mood normal.       Significant Labs: reviewed      Assessment/Plan:     Active Diagnoses:    Diagnosis Date Noted POA    PRINCIPAL PROBLEM:  Metabolic acidosis [E87.20] 06/14/2023 Yes    Anxiety [F41.9] 06/16/2023 Yes    Hypokalemia [E87.6] 06/16/2023 Yes    Hypomagnesemia [E83.42] 06/16/2023 Yes    Hypophosphatemia [E83.39] 06/16/2023 Yes    High serum osmolar gap [R74.8] 06/15/2023 Yes    Visual hallucination [R44.1] 06/15/2023 Yes    Encephalopathy [G93.40] 06/14/2023 Yes    Pneumonia of right upper lobe due to infectious organism [J18.9] 06/14/2023 Yes    Acute respiratory failure with hypoxia [J96.01] 06/14/2023 Yes    Severe sepsis [A41.9, R65.20] 06/14/2023 Yes      Problems Resolved During this Admission:         Metabolic acidosis, AG at presentation required RRT for treatment  - etiology remains unknown but alcohol ingestion, specifically methanol, high on differential  - no indications for further RRT and can d/c temp dialysis line     Hypernatremia  - po intake to self correct    Hypophos/Hypokalemia/HypoMag  - phos and potassium under repletion  - monitor levels         Tim Rangel,  MD  Nephrology

## 2023-06-17 NOTE — ASSESSMENT & PLAN NOTE
This patient does have evidence of infective focus  My overall impression is sepsis.  Source: Respiratory  Antibiotics given-   Antibiotics (72h ago, onward)    Start     Stop Route Frequency Ordered    06/15/23 1700  cefTRIAXone (ROCEPHIN) 1 g in dextrose 5 % in water (D5W) 5 % 100 mL IVPB (MB+)         06/21 1659 IV Every 24 hours (non-standard times) 06/15/23 1416    06/14/23 0900  mupirocin 2 % ointment  (DECOLONIZATION PROTOCOL ORDERS)         06/19 0859 Nasl 2 times daily 06/14/23 0122        Latest lactate reviewed-  Recent Labs   Lab 06/15/23  0032 06/15/23  0543 06/15/23  0814   LACTATE 1.9 1.8 1.3     Organ dysfunction indicated by Encephalopathy    Fluid challenge Not needed - patient is not hypotensive      Post- resuscitation assessment No - Post resuscitation assessment not needed       Will Not start Pressors- Levophed for MAP of 65  Source control achieved by: IV antibiotics

## 2023-06-17 NOTE — ASSESSMENT & PLAN NOTE
Initially presented with high anion gap metabolic acidosis and high osmolar gap with CO2 < 5, pH 7.0  Severe and out of proportion to lactate and ketones.  Salicylates negative.  Concern for toxic alcohols.  Particularly methanol given reported vision changes.   Methanol, ethylene glycol, diastolic glycol labs pending  Concurrent osmolal gap noted on 6/14  Nephrology consulted, patient had emergency HD done in addition to initiation bicarbonate infusion   Trialysis catheter removed on 6/16/23  Acidosis improved

## 2023-06-17 NOTE — ASSESSMENT & PLAN NOTE
Patient required emergent intubation for airway protection in ED  Successfully extubated on 06/15/2023 and transitioned to nasal cannula  Weaned off supplemental O2  Continue treatment for pneumonia

## 2023-06-17 NOTE — PROGRESS NOTES
"[facility] Medicine  Progress Note     Patient Name: Shania Tapia  MRN: [id number]  Patient Class: IP- Inpatient     Admission Date: 6/13/2023  Length of Stay: 4 days  Attending Physician: Tyshawn Solis MD  Primary Care Provider: Flavia Fink DO           Subjective:      Principal Problem:Metabolic acidosis    HPI:  Shania Tapia is a 37 y.o. female with Anxiety, and Hypertension who presented to ED on 6/13/2023 with complaint of blurry vision, color vision changes, difficulty with word findings and unsteady gait. Additionally have been feeling jittery for a couple days prior which she initially attributed to anxiety. Work up in ED included CT head and MRI brain which showed no acute findings. Labs showed severe high anion gap metabolic acidosis with CO2 < 5, ABG 7.065/9.8/108/2.8 on room air; D-dimer 9.38. CTA chest shows dense RUL pneumonia     Started on empiric antibiotics, IV bicarbonate push, with no improvement noted in repeat ABG. Patient then was noted to have an episode of hand jerkings suspicious for seizure like activity, given a dose of Ativan then went unresponsive. Subsequently was emergently intubated for airway protection. Admitted to ICU for further management     ICU Course:  Patient with HAGMA out of proportion to lactate and ketones.  Concern for toxic alcohol, possibly methanol given vision changes.  Started on fomepizole, bicarbonate infusion.  Renal consulted and initiated emergency HD on admission.   Also started on cefepime, azithromycin, and vanc for empiric treatment of RUL pneumonia     Bicarbonate drip discontinued after acidosis resolved. Successfully extubated on 6/15, transitioned to NC. Patient denies ingestion of any known toxins, or self harm ideation. Vision returned to baseline. However she notes seeing "flowers" and "spiders" at times. States that she knows they're not real. Psych consult pending     Stable for transfer out of " ICU, hospital medicine consulted for continued care.     Patient seen and examined with  and mother present.  She is unable to recall why she came to the hospital.  However she denies any complaints, oriented x3.  Reports that blurriness in her vision is continuing to improve as the day progress.  Family believes that appears to be back to her usual baseline.  Per patient she has been under lot of stress with her job recently thinks that when she sees what's not there is because she spends a lot of time staring at the screen in the course of her job as a         Overview/Hospital Course:  Evaluated by psych, at this time patient is does not meet criteria for PEC, however psych was not able to obtain collateral from .  They recommend PEC if  has any concern regarding suicide ideation/attempt.  They also recommend offering escitalopram 10 mg daily to help target anxiety.        adamantly denies any concern of deliberate attempts at self-harm although admits that she has been under a great deal of stress over the past couple of months in relation to her job.  States that at times her hands even shake due to the stress.  States that patient is not prescribed any medication for anxiety, although she plans to speak with her PCP on her next appointment.  However, she has been trying to treat her anxiety with over-the-counter supplements including Ashwaganda.  Additionally she has taken some anxiety meds which are prescribed to her mother and  to help control her anxiety.  However all these meds have been given freely, patient does not display any seeking behavior.  Per , patient is very cautious about taking any medication, subsequently he does not believe she would take something random without researching it thoroughly.     PT/OT recommend rehab placement at this time  SLP recommend Soft & Bite Sized Diet - IDDSI Level 6, Thin liquids - IDDSI Level 0    "  Electrolyte derangements being repleted        Interval History:  In previous documentation, it is confirmed that Methanol serum tests were positive.     As for the patient- TRISTA. Reports improvement in her symptoms, however, some noticeably lapses in memory. When asked if she's worked with physical therapy she denies this despite them working with her on yesterday.     UPDATE: [place] called lab, confirms early preliminary positive test for Volatile screening, a test that tests for Acetone,  Isopropanol, Ethanol, and Methanol.     Objective  BP (!) 153/97 (BP Location: Right arm, Patient Position: Lying)   Pulse 105   Temp 97.5 °F (36.4 °C)   Resp 16   Ht 5' 1" (1.549 m)   Wt 68.4 kg (150 lb 12.7 oz)   LMP  (LMP Unknown) Comment: last period less than 30 days ago per   SpO2 100%   Breastfeeding No   BMI 28.49 kg/m²     Intake/Output Summary (Last 24 hours) at 6/17/2023 1002  Last data filed at 6/17/2023 0640  Gross per 24 hour   Intake 240 ml   Output 3400 ml   Net -3160 ml       PHYSICAL EXAM  Vitals and nursing note reviewed. Exam conducted with a chaperone present.   Constitutional:       General: She is not in acute distress.     Appearance: She is not ill-appearing.   HENT:      Head: Normocephalic and atraumatic.      Right Ear: External ear normal.      Left Ear: External ear normal.      Nose: Nose normal.      Mouth/Throat:      Mouth: Mucous membranes are moist.   Eyes:      Extraocular Movements: Extraocular movements intact.      Pupils: Pupils are equal, round, and reactive to light.   Cardiovascular:      Rate and Rhythm: Regular rhythm. Tachycardia present.   Pulmonary:      Effort: Pulmonary effort is normal. No accessory muscle usage or respiratory distress.      Breath sounds: Examination of the right-upper field reveals rhonchi. Rhonchi present. No wheezing.      Comments: On room air  Abdominal:      General: Bowel sounds are normal. There is no distension.      Palpations: " Abdomen is soft.      Tenderness: There is no abdominal tenderness. There is no guarding or rebound.   Genitourinary:     Comments: Little catheter in place  Musculoskeletal:      Cervical back: Neck supple.      Right lower leg: No edema.      Left lower leg: No edema.   Skin:     General: Skin is warm and dry.   Neurological:      Mental Status: She is alert and oriented to person, place, and time.      Cranial Nerves: No cranial nerve deficit.      Motor: No weakness.   Psychiatric:         Attention and Perception: She is inattentive. She perceives visual hallucinations.         Speech: Speech is not rapid and pressured.         Behavior: Behavior is cooperative.         Thought Content: Thought content is not paranoid.          BMP:   Recent Labs   Lab 06/17/23  0416   GLU 98   *   K 2.7*      CO2 32*   BUN 7   CREATININE 0.8   CALCIUM 8.6*   MG 2.0     CBC:   Recent Labs   Lab 06/16/23  0449 06/17/23  0416   WBC 10.65 7.93   HGB 8.5* 8.7*   HCT 28.6* 28.7*   * 124*     CMP:   Recent Labs   Lab 06/16/23 0449 06/16/23  1721 06/17/23  0416   * 141 148*   K 2.6* 3.7 2.7*    100 105   CO2 36* 32* 32*   GLU 99 121* 98   BUN 9 9 7   CREATININE 1.0 0.9 0.8   CALCIUM 8.9 8.1* 8.6*   PROT 6.4  --  6.4   ALBUMIN 2.6*  --  2.5*   BILITOT 0.9  --  0.5   ALKPHOS 108  --  121   AST 55*  --  58*   ALT 20  --  27   ANIONGAP 10 9 11     Cardiac Markers: No results for input(s): CKMB, MYOGLOBIN, BNP, TROPISTAT in the last 48 hours.  Coagulation: No results for input(s): PT, INR, APTT in the last 48 hours.  Lactic Acid: No results for input(s): LACTATE in the last 48 hours.  Magnesium:   Recent Labs   Lab 06/16/23 0449 06/17/23  0416   MG 2.4 2.0     Troponin: No results for input(s): TROPONINI, TROPONINIHS in the last 48 hours.  TSH:   Recent Labs   Lab 06/13/23  1415   TSH 1.259     Urine Studies:   No results for input(s): COLORU, APPEARANCEUA, PHUR, SPECGRAV, PROTEINUA, GLUCUA, KETONESU,  BILIRUBINUA, OCCULTUA, NITRITE, UROBILINOGEN, LEUKOCYTESUR, RBCUA, WBCUA, BACTERIA, SQUAMEPITHEL, HYALINECASTS in the last 48 hours.    Invalid input(s): WRIGHTSUR    Imaging Results              X-Ray Chest AP Portable (Final result)  Result time 06/14/23 08:02:02      Final result by Logan Benedict MD (06/14/23 08:02:02)                   Impression:      No acute finding in the chest.    Findings discussed with Dr. Woody by telephone on June 14, 2023 at 07:59 within 5 minutes of making the finding.      Electronically signed by: Logan Benedict  Date:    06/14/2023  Time:    08:02               Narrative:    EXAMINATION:  XR CHEST AP PORTABLE    CLINICAL HISTORY:  Hypoxia.  Status post intubation.    FINDINGS:  Comparison is made to CTA chest June 13, 2023.  Single frontal view of the chest.  No comparison.  Right mainstem bronchus intubation.  Consolidation in the right upper lobe.  Left lung is clear.  No pneumothorax or significant pleural effusion.  Enteric catheter appears appropriately position.                                        CTA Chest Non-Coronary (PE Studies) (Final result)  Result time 06/13/23 23:50:43      Final result by Bao Juan MD (06/13/23 23:50:43)                   Impression:      Large right upper lobe opacity favoring pneumonia.  Follow-up to complete resolution recommended.    No definite pulmonary embolus noting suboptimal opacification and motion.    This report was flagged in Epic as abnormal.    All CT scans at this facility are performed  using dose modulation techniques as appropriate to performed exam including the following:  automated exposure control; adjustment of mA and/or kV according to the patients size (this includes techniques or standardized protocols for targeted exams where dose is matched to indication/reason for exam: i.e. extremities or head);  iterative reconstruction technique.      Electronically signed by: Bao  Yessica  Date:    06/13/2023  Time:    23:50               Narrative:    EXAMINATION:  CTA CHEST NON CORONARY (PE STUDIES)    CLINICAL HISTORY:  Pulmonary embolism (PE) suspected, high prob;    TECHNIQUE:  Low dose axial images, sagittal and coronal reformations were obtained from the thoracic inlet to the lung bases following the IV administration of 100 mL of Omnipaque 350.  Contrast timing was optimized to evaluate the pulmonary arteries.  MIP images were performed.    COMPARISON:  None    FINDINGS:  Base of Neck: No significant abnormality.    Thoracic soft tissues: Unremarkable.    Aorta: Left-sided aortic arch.  No aneurysm and no significant atherosclerosis    Heart: Normal size. No effusion.    Pulmonary vasculature: Suboptimal opacification related to contrast timing.  Evaluation adequate to the level of the segmental pulmonary arteries.  Motion degrades the exam.    Nichelle/Mediastinum: No pathologic alla enlargement.    Airways: Patent.    Lungs/Pleura: Large right upper lobe opacity favoring pneumonia.  Trace opacities in the middle and lower lobes.  Left lung appears clear allowing for motion artifact.    Esophagus: Unremarkable.    Upper Abdomen: No abnormality of the partially imaged upper abdomen.    Bones: No acute fracture. No suspicious lytic or sclerotic lesions.                                       MRI Brain Demyelinating W W/O Contrast (Final result)  Result time 06/13/23 20:55:45      Final result by Drea Whitfield MD (06/13/23 20:55:45)                   Impression:      No overt acute finding as visualized motion degradation      Electronically signed by: Drea Whitfield  Date:    06/13/2023  Time:    20:55               Narrative:    EXAMINATION:  MRI BRAIN DEMYELINATING W/ WO CONTRAST    CLINICAL HISTORY:  altered mental status, aphasia, reports can't see color;    TECHNIQUE:  Multiplanar multisequence MR imaging of the brain was performed before and after the administration of 6 mL  Gadavist intravenous contrast.    COMPARISON:  CT correlation    FINDINGS:  Imaging degraded by motion.  No acute ischemia identified.    No mass effect or midline shift.    Ventricles nondistended.    No overt abnormal parenchymal signal as visualized with motion degradation.    No atypical enhancement                                       CT Head Without Contrast (Final result)  Result time 06/13/23 15:09:25      Final result by STONE Castro Sr., MD (06/13/23 15:09:25)                   Impression:      Normal study.    All CT scans at this facility use dose modulation, iterative reconstruction, and/or weight base dosing when appropriate to reduce radiation dose when appropriate to reduce radiation dose to as low as reasonably achievable.      Electronically signed by: Carlos Enrique Castro MD  Date:    06/13/2023  Time:    15:09               Narrative:    EXAMINATION:  CT HEAD WITHOUT CONTRAST    CLINICAL HISTORY:  Mental status change, unknown cause;    TECHNIQUE:  Standard brain CT protocol without IV contrast was performed.    COMPARISON:  None    FINDINGS:  The ventricles have a normal size, position, and appearance. There is no abnormal intracranial mass or intracranial hemorrhage. There is no skull fracture. The paranasal sinuses are normal in appearance.                                      Assessment/Plan:      * Metabolic acidosis  Initially presented with high anion gap metabolic acidosis and high osmolar gap with CO2 < 5, pH 7.0  Severe and out of proportion to lactate and ketones.  Salicylates negative.  Concern for toxic alcohols.  Particularly methanol given reported vision changes.   Methanol, ethylene glycol, diastolic glycol labs pending  Concurrent osmolal gap noted on 6/14  Nephrology consulted, patient had emergency HD done in addition to initiation bicarbonate infusion   Trialysis catheter removed on 6/16/23  Acidosis improved    06/17/2023  Improved, nearly resolved  Nephrology following,  appreciate recs     Encephalopathy  Negative imaging with CT head and MRI brain  Suspected to be related to sepsis and toxic logic etiologies  Continuing broad-spectrum antibiotics and supportive care  Monitor    Hypophosphatemia  Replete as indicated   Monitor     Hypomagnesemia  Replete as indicated   Monitor     Hypokalemia  Replete as needed  Monitor  06/17/2023  Low again today, replaced by Nephrology     Anxiety  Patient admits to ongoing increased anxiety over the past several days prior to ED presentation, however  reports that this has been ongoing for at least a couple months she had had patient has attempted to try over-the-counter meds such as Ashwaganda and that she has tried some anxiety meds that have been prescribed to her mother and  with their knowledge  Discussed psych recommendations consideration for starting escitalopram 10 mg with patient.  Patient has not decided yet whether she would like to start escitalopram     Visual hallucination  Patient reports seeing things that she knows it is not there  Additionally also reports to a lot of stress and anxiety associated with her job in addition to unintentional 10-15 lb weight loss over the last couple of months  Tele psych consulted, patient is not recommended for PEC at this time   adamantly denies any concern of suicide or self-harm  Re-consult psych as needed     Severe sepsis - improving  This patient does have evidence of infective focus  My overall impression is sepsis.  Source: Respiratory  Will Not start Pressors- Levophed for MAP of 65  Source control achieved by: IV antibiotics  Monitor fever curve    06/17/2023  Cultures no growth to date.  Last day of Antibiotic therapy on 6/22/23  Rest of plan as stated above     Acute respiratory failure with hypoxia  Patient required emergent intubation for airway protection in ED  Successfully extubated on 06/15/2023 and transitioned to nasal cannula  Weaned off supplemental  O2  Continue treatment for pneumonia     Pneumonia of right upper lobe due to infectious organism  Large right upper lobe opacity favoring pneumonia  Empirically started on vancomycin, cefepime and azithromycin initially  Endotracheal aspirate sent for culture, we will follow  Transitioned to ceftriaxone and azithromycin  Leukocytosis resolved  Monitor respiratory status              VTE Risk Mitigation (From admission, onward)           Ordered       enoxaparin injection 40 mg  Daily         06/14/23 0122                    Discharge Planning   MANDEEP:      Code Status: Full Code   Is the patient medically ready for discharge?:     Reason for patient still in hospital (select all that apply): Patient trending condition, Treatment and Consult recommendations  Discharge Plan A: Rehab         Tyshawn Solis MD  Department of Hospital Medicine   [facility] [facility])

## 2023-06-17 NOTE — ASSESSMENT & PLAN NOTE
Negative imaging with CT head and MRI brain  Suspected to be related to sepsis and toxic logic etiologies  Continuing broad-spectrum antibiotics and supportive care  Monitor

## 2023-06-17 NOTE — PLAN OF CARE
06/17/23 1203   Post-Acute Status   Post-Acute Authorization Placement  (Rehab)   Post-Acute Placement Status Patient List Provided   Discharge Plan   Discharge Plan A Rehab   Discharge Plan B Home Health     CM provided rehab list from [facility] website.

## 2023-06-17 NOTE — SUBJECTIVE & OBJECTIVE
Interval History:  Seen and examined with  present.  Overnight, apparently patient continued to have hallucinations overnight.  States that she saw a woman wearing lingerie and dancing while she was talking with telepsych.  Reports that her vision is better, less blurry.  However she is not necessarily able to identify colors, unclear if her vision is really improving.  Still appears confused.    Review of Systems  Objective:     Vital Signs (Most Recent):  Temp: 98.6 °F (37 °C) (06/16/23 1627)  Pulse: (!) 118 (06/16/23 1736)  Resp: 16 (06/16/23 1627)  BP: (!) 142/89 (06/16/23 1627)  SpO2: 97 % (06/16/23 1627) Vital Signs (24h Range):  Temp:  [98.6 °F (37 °C)-98.9 °F (37.2 °C)] 98.6 °F (37 °C)  Pulse:  [112-120] 118  Resp:  [13-28] 16  SpO2:  [92 %-100 %] 97 %  BP: (139-148)/(89-97) 142/89     Weight: 68.4 kg (150 lb 12.7 oz)  Body mass index is 28.49 kg/m².    Intake/Output Summary (Last 24 hours) at 6/16/2023 1930  Last data filed at 6/16/2023 1709  Gross per 24 hour   Intake 2285.33 ml   Output 3575 ml   Net -1289.67 ml         Physical Exam  Vitals and nursing note reviewed. Exam conducted with a chaperone present.   Constitutional:       General: She is not in acute distress.     Appearance: She is not ill-appearing.   HENT:      Head: Normocephalic and atraumatic.      Right Ear: External ear normal.      Left Ear: External ear normal.      Nose: Nose normal.      Mouth/Throat:      Mouth: Mucous membranes are moist.   Eyes:      Extraocular Movements: Extraocular movements intact.      Pupils: Pupils are equal, round, and reactive to light.   Cardiovascular:      Rate and Rhythm: Regular rhythm. Tachycardia present.   Pulmonary:      Effort: Pulmonary effort is normal. No accessory muscle usage or respiratory distress.      Breath sounds: Examination of the right-upper field reveals rhonchi. Rhonchi present. No wheezing.      Comments: On room air  Abdominal:      General: Bowel sounds are normal.  There is no distension.      Palpations: Abdomen is soft.      Tenderness: There is no abdominal tenderness. There is no guarding or rebound.   Genitourinary:     Comments: Little catheter in place  Musculoskeletal:      Cervical back: Neck supple.      Right lower leg: No edema.      Left lower leg: No edema.   Skin:     General: Skin is warm and dry.   Neurological:      Mental Status: She is alert and oriented to person, place, and time.      Cranial Nerves: No cranial nerve deficit.      Motor: No weakness.   Psychiatric:         Attention and Perception: She is inattentive. She perceives visual hallucinations.         Speech: Speech is not rapid and pressured.         Behavior: Behavior is cooperative.         Thought Content: Thought content is not paranoid.           Significant Labs: All pertinent labs within the past 24 hours have been reviewed.  CBC:   Recent Labs   Lab 06/15/23  0543 06/16/23 0449   WBC 10.62 10.65   HGB 8.4* 8.5*   HCT 27.6* 28.6*   * 127*     CMP:   Recent Labs   Lab 06/15/23  0543 06/16/23  0449 06/16/23  1721    146* 141   K 3.3* 2.6* 3.7   CL 95 100 100   CO2 30* 36* 32*    99 121*   BUN 7 9 9   CREATININE 0.8 1.0 0.9   CALCIUM 8.3* 8.9 8.1*   PROT 5.9* 6.4  --    ALBUMIN 2.4* 2.6*  --    BILITOT 1.4* 0.9  --    ALKPHOS 85 108  --    AST 28 55*  --    ALT 14 20  --    ANIONGAP 13 10 9     Magnesium:   Recent Labs   Lab 06/15/23  0543 06/16/23 0449   MG 1.5* 2.4       Significant Imaging: I have reviewed all pertinent imaging results/findings within the past 24 hours.

## 2023-06-17 NOTE — PROGRESS NOTES
"[facility] ([facility] Medicine  Progress Note    Patient Name: Shania Tapia  MRN: [id number]  Patient Class: IP- Inpatient   Admission Date: 6/13/2023  Length of Stay: 2 days  Attending Physician: Cynthia Grayson DO  Primary Care Provider: Flavia Fink DO        Subjective:     Principal Problem:Metabolic acidosis        HPI:  Shania Tapia is a 37 y.o. female with Anxiety, and Hypertension who presented to ED on 6/13/2023 with complaint of blurry vision, color vision changes, difficulty with word findings and unsteady gait. Additionally have been feeling jittery for a couple days prior which she initially attributed to anxiety. Work up in ED included CT head and MRI brain which showed no acute findings. Labs showed severe high anion gap metabolic acidosis with CO2 < 5, ABG 7.065/9.8/108/2.8 on room air; D-dimer 9.38. CTA chest shows dense RUL pneumonia    Started on empiric antibiotics, IV bicarbonate push, with no improvement noted in repeat ABG. Patient then was noted to have an episode of hand jerkings suspicious for seizure like activity, given a dose of Ativan then went unresponsive. Subsequently was emergently intubated for airway protection. Admitted to ICU for further management    ICU Course:  Patient with HAGMA out of proportion to lactate and ketones.  Concern for toxic alcohol, possibly methanol given vision changes.  Started on fomepizole, bicarbonate infusion.  Renal consulted and initiated emergency HD on admission.   Also started on cefepime, azithromycin, and vanc for empiric treatment of RUL pneumonia    Bicarbonate drip discontinued after acidosis resolved. Successfully extubated on 6/15, transitioned to NC. Patient denies ingestion of any known toxins, or self harm ideation. Vision returned to baseline. However she notes seeing "flowers" and "spiders" at times. States that she knows they're not real. Psych consult pending    Stable for transfer out of ICU, hospital " medicine consulted for continued care.    Patient seen and examined with  and mother present.  She is unable to recall why she came to the hospital.  However she denies any complaints, oriented x3.  Reports that blurriness in her vision is continuing to improve as the day progress.  Family believes that appears to be back to her usual baseline.  Per patient she has been under lot of stress with her job recently thinks that when she sees what's not there is because she spends a lot of time staring at the screen in the course of her job as a       Overview/Hospital Course:  Evaluated by psych, at this time patient is does not meet criteria for PEC, however psych was not able to obtain collateral from .  They recommend PEC if  has any concern regarding suicide ideation/attempt.  They also recommend offering escitalopram 10 mg daily to help target anxiety.       adamantly denies any concern of deliberate attempts at self-harm although admits that she has been under a great deal of stress over the past couple of months in relation to her job.  States that at times her hands even shake due to the stress.  States that patient is not prescribed any medication for anxiety, although she plans to speak with her PCP on her next appointment.  However, she has been trying to treat her anxiety with over-the-counter supplements including Ashwaganda.  Additionally she has taken some anxiety meds which are prescribed to her mother and  to help control her anxiety.  However all these meds have been given freely, patient does not display any seeking behavior.  Per , patient is very cautious about taking any medication, subsequently he does not believe she would take something random without researching it thoroughly.    PT/OT recommend rehab placement at this time  SLP recommend Soft & Bite Sized Diet - IDDSI Level 6, Thin liquids - IDDSI Level 0    Electrolyte derangements  being repleted      Interval History:  Seen and examined with  present.  Overnight, apparently patient continued to have hallucinations overnight.  States that she saw a woman wearing lingerie and dancing while she was talking with telepsych.  Reports that her vision is better, less blurry.  However she is not necessarily able to identify colors, unclear if her vision is really improving.  Still appears confused.    Review of Systems  Objective:     Vital Signs (Most Recent):  Temp: 98.6 °F (37 °C) (06/16/23 1627)  Pulse: (!) 118 (06/16/23 1736)  Resp: 16 (06/16/23 1627)  BP: (!) 142/89 (06/16/23 1627)  SpO2: 97 % (06/16/23 1627) Vital Signs (24h Range):  Temp:  [98.6 °F (37 °C)-98.9 °F (37.2 °C)] 98.6 °F (37 °C)  Pulse:  [112-120] 118  Resp:  [13-28] 16  SpO2:  [92 %-100 %] 97 %  BP: (139-148)/(89-97) 142/89     Weight: 68.4 kg (150 lb 12.7 oz)  Body mass index is 28.49 kg/m².    Intake/Output Summary (Last 24 hours) at 6/16/2023 1930  Last data filed at 6/16/2023 1709  Gross per 24 hour   Intake 2285.33 ml   Output 3575 ml   Net -1289.67 ml         Physical Exam  Vitals and nursing note reviewed. Exam conducted with a chaperone present.   Constitutional:       General: She is not in acute distress.     Appearance: She is not ill-appearing.   HENT:      Head: Normocephalic and atraumatic.      Right Ear: External ear normal.      Left Ear: External ear normal.      Nose: Nose normal.      Mouth/Throat:      Mouth: Mucous membranes are moist.   Eyes:      Extraocular Movements: Extraocular movements intact.      Pupils: Pupils are equal, round, and reactive to light.   Cardiovascular:      Rate and Rhythm: Regular rhythm. Tachycardia present.   Pulmonary:      Effort: Pulmonary effort is normal. No accessory muscle usage or respiratory distress.      Breath sounds: Examination of the right-upper field reveals rhonchi. Rhonchi present. No wheezing.      Comments: On room air  Abdominal:      General: Bowel  sounds are normal. There is no distension.      Palpations: Abdomen is soft.      Tenderness: There is no abdominal tenderness. There is no guarding or rebound.   Genitourinary:     Comments: Little catheter in place  Musculoskeletal:      Cervical back: Neck supple.      Right lower leg: No edema.      Left lower leg: No edema.   Skin:     General: Skin is warm and dry.   Neurological:      Mental Status: She is alert and oriented to person, place, and time.      Cranial Nerves: No cranial nerve deficit.      Motor: No weakness.   Psychiatric:         Attention and Perception: She is inattentive. She perceives visual hallucinations.         Speech: Speech is not rapid and pressured.         Behavior: Behavior is cooperative.         Thought Content: Thought content is not paranoid.           Significant Labs: All pertinent labs within the past 24 hours have been reviewed.  CBC:   Recent Labs   Lab 06/15/23  0543 06/16/23 0449   WBC 10.62 10.65   HGB 8.4* 8.5*   HCT 27.6* 28.6*   * 127*     CMP:   Recent Labs   Lab 06/15/23  0543 06/16/23  0449 06/16/23  1721    146* 141   K 3.3* 2.6* 3.7   CL 95 100 100   CO2 30* 36* 32*    99 121*   BUN 7 9 9   CREATININE 0.8 1.0 0.9   CALCIUM 8.3* 8.9 8.1*   PROT 5.9* 6.4  --    ALBUMIN 2.4* 2.6*  --    BILITOT 1.4* 0.9  --    ALKPHOS 85 108  --    AST 28 55*  --    ALT 14 20  --    ANIONGAP 13 10 9     Magnesium:   Recent Labs   Lab 06/15/23  0543 06/16/23 0449   MG 1.5* 2.4       Significant Imaging: I have reviewed all pertinent imaging results/findings within the past 24 hours.      Assessment/Plan:      * Metabolic acidosis  Initially presented with high anion gap metabolic acidosis and high osmolar gap with CO2 < 5, pH 7.0  Severe and out of proportion to lactate and ketones.  Salicylates negative.  Concern for toxic alcohols.  Particularly methanol given reported vision changes.   Methanol, ethylene glycol, diastolic glycol labs pending  Concurrent  osmolal gap noted on 6/14  Nephrology consulted, patient had emergency HD done in addition to initiation bicarbonate infusion   Trialysis catheter removed on 6/16/23  Acidosis improved    Hypophosphatemia  Replete as indicated   Monitor    Hypomagnesemia  Replete as indicated   Monitor    Hypokalemia  Replete as needed  Monitor    Anxiety  Patient admits to ongoing increased anxiety over the past several days prior to ED presentation, however  reports that this has been ongoing for at least a couple months she had had patient has attempted to try over-the-counter meds such as Ashwaganda and that she has tried some anxiety meds that have been prescribed to her mother and  with their knowledge  Discussed psych recommendations consideration for starting escitalopram 10 mg with patient.  Patient has not decided yet whether she would like to start escitalopram    Visual hallucination  Patient reports seeing things that she knows it is not there  Additionally also reports to a lot of stress and anxiety associated with her job in addition to unintentional 10-15 lb weight loss over the last couple of months  Tele psych consulted, patient is not recommended for PEC at this time   adamantly denies any concern of suicide or self-harm  Re-consult psych as needed    High serum osmolar gap        Severe sepsis  This patient does have evidence of infective focus  My overall impression is sepsis.  Source: Respiratory  Antibiotics given-   Antibiotics (72h ago, onward)    Start     Stop Route Frequency Ordered    06/15/23 1700  cefTRIAXone (ROCEPHIN) 1 g in dextrose 5 % in water (D5W) 5 % 100 mL IVPB (MB+)         06/21 1659 IV Every 24 hours (non-standard times) 06/15/23 1416    06/14/23 0900  mupirocin 2 % ointment  (DECOLONIZATION PROTOCOL ORDERS)         06/19 0859 Nasl 2 times daily 06/14/23 0122        Latest lactate reviewed-  Recent Labs   Lab 06/15/23  0032 06/15/23  0543 06/15/23  0814   LACTATE 1.9  1.8 1.3     Organ dysfunction indicated by Encephalopathy    Fluid challenge Not needed - patient is not hypotensive      Post- resuscitation assessment No - Post resuscitation assessment not needed       Will Not start Pressors- Levophed for MAP of 65  Source control achieved by: IV antibiotics    Acute respiratory failure with hypoxia  Patient required emergent intubation for airway protection in ED  Successfully extubated on 06/15/2023 and transitioned to nasal cannula  Weaned off supplemental O2  Continue treatment for pneumonia    Pneumonia of right upper lobe due to infectious organism  Large right upper lobe opacity favoring pneumonia  Empirically started on vancomycin, cefepime and azithromycin initially  Endotracheal aspirate sent for culture, we will follow  Transitioned to ceftriaxone and azithromycin  Leukocytosis resolved  Monitor respiratory status    Encephalopathy  Negative imaging with CT head and MRI brain  Suspected to be related to sepsis and toxic logic etiologies  Continuing broad-spectrum antibiotics and supportive care  Monitor    VTE Risk Mitigation (From admission, onward)         Ordered     enoxaparin injection 40 mg  Daily         06/14/23 0122                Discharge Planning   MANDEEP:      Code Status: Full Code   Is the patient medically ready for discharge?:     Reason for patient still in hospital (select all that apply): Patient trending condition, Treatment and Consult recommendations  Discharge Plan A: Home with family                  Cynthia Grayson DO  Department of Hospital Medicine   [facility] - Telemetry ([facility])

## 2023-06-17 NOTE — PLAN OF CARE
Patient updated on plan of care. Fall  and security precautions in place. Vitals every 4 hours. Bed alarm on. Patient remains free from falls. Education provided; questions encouraged and answered. Pt had several large bm, fox mckenna, ao4x this morning, sent blood off for heavy metal testing.

## 2023-06-17 NOTE — ASSESSMENT & PLAN NOTE
Patient reports seeing things that she knows it is not there  Additionally also reports to a lot of stress and anxiety associated with her job in addition to unintentional 10-15 lb weight loss over the last couple of months  Tele psych consulted, patient is not recommended for PEC at this time   adamantly denies any concern of suicide or self-harm  Re-consult psych as needed

## 2023-06-17 NOTE — ASSESSMENT & PLAN NOTE
Large right upper lobe opacity favoring pneumonia  Empirically started on vancomycin, cefepime and azithromycin initially  Endotracheal aspirate sent for culture, we will follow  Transitioned to ceftriaxone and azithromycin  Leukocytosis resolved  Monitor respiratory status

## 2023-06-17 NOTE — PT/OT/SLP PROGRESS
"Physical Therapy Treatment    Patient Name:  Shania Tapia   MRN:  [id number]    Recommendations:     Discharge Recommendations: rehabilitation facility  Discharge Equipment Recommendations:  (TBD)  Barriers to discharge: None    Assessment:     Shania Tapia is a 37 y.o. female admitted with a medical diagnosis of Metabolic acidosis.  She presents with the following impairments/functional limitations: weakness, impaired cognition, impaired functional mobility, gait instability, impaired endurance, impaired self care skills, impaired balance, impaired coordination, decreased safety awareness, abnormal tone, decreased lower extremity function.    Pt continues to require assistance with activities and remains at risk for falls.    Rehab Prognosis: Good; patient would benefit from acute skilled PT services to address these deficits and reach maximum level of function.    Recent Surgery: * No surgery found *      Plan:     During this hospitalization, patient to be seen 3 x/week to address the identified rehab impairments via gait training, therapeutic activities, therapeutic exercises, neuromuscular re-education and progress toward the following goals:    Plan of Care Expires:  06/30/23    Subjective     Patient/Family Comments/goals: "This is too much." "The bed is moving."  Pain/Comfort:  Pain Rating 1: 0/10  Pain Rating Post-Intervention 1: 0/10      Objective:     Communicated with pt's nurse, Kari, prior to session.  Patient found HOB elevated with peripheral IV, telemetry, oxygen, central line, PureWick upon PT entry to room.     Additional staff present: Therapy TechnicianAram    General Precautions: Standard, fall  Orthopedic Precautions: N/A  Braces: N/A  Respiratory Status: Nasal cannula, flow 1 L/min     Functional Mobility:  Bed Mobility:   Supine> Sit: minimum assistance with HOB elevated  For B LE clearance and trunk management  Sit> Supine: moderate assistance increased verbal cueing for " "technique  Scooting anteriorly to plant feet on floor: min (A)  Scooting toward the HOB: mod (A) of 2. Pt able to propel self upward with B UE and partial bridge assist.  Transfers: Not attempted on this date due to pt's apprehensiveness and decreased safety awareness.  Balance:   Sitting: Fair; pt able to sit at EOB for ~10 minutes.  Standing: NT      AM-PAC 6 CLICK MOBILITY  Turning over in bed (including adjusting bedclothes, sheets and blankets)?: 2  Sitting down on and standing up from a chair with arms (e.g., wheelchair, bedside commode, etc.): 1 (NA)  Moving from lying on back to sitting on the side of the bed?: 2  Moving to and from a bed to a chair (including a wheelchair)?: 1 (NA)  Need to walk in hospital room?: 1 (NA)  Climbing 3-5 steps with a railing?: 1  Basic Mobility Total Score: 8       Treatment & Education:  Pt educated on the goals of the session, proper transfer technique, and POC.    PTA initiated pre-standing activities with anterior weight-shifts to bear weight through B UE and B LE for functional transfers. Ultimately, patient returned to supine from sitting secondary to ataxia and anxiousness stating "This is too much." Pt denies dizziness or lightheadedness.    Pt and pt's spouse educated on bed-level therapeutic exercises including glute squeezes, AAROM supine hip abduction/adduction, assisted bridges, and AAROM heel slides to improve coordination, muscle strength, and circulation for functional transfers.    Patient left HOB elevated with all lines intact, call button in reach, bed alarm on, and pt's spouse present.    GOALS:   Multidisciplinary Problems       Physical Therapy Goals          Problem: Physical Therapy    Goal Priority Disciplines Outcome Goal Variances Interventions   Physical Therapy Goal     PT, PT/OT      Description: Pt will perform bed mobility independently in order to participate in EOB activity.  Pt will perform transfers independently in order to participate in " OOB activity.   Pt will ambulate 150ft mod I with LRAD in order to participate in daily tasks.                         Time Tracking:     PT Received On: 06/17/23  PT Start Time: 0900     PT Stop Time: 0920  PT Total Time (min): 20 min     Billable Minutes: Neuromuscular Re-education 20    Treatment Type: Treatment        Number of PTA visits since last PT visit: 1 06/17/2023

## 2023-06-17 NOTE — ASSESSMENT & PLAN NOTE
Patient admits to ongoing increased anxiety over the past several days prior to ED presentation, however  reports that this has been ongoing for at least a couple months she had had patient has attempted to try over-the-counter meds such as Ashwaganda and that she has tried some anxiety meds that have been prescribed to her mother and  with their knowledge  Discussed psych recommendations consideration for starting escitalopram 10 mg with patient.  Patient has not decided yet whether she would like to start escitalopram

## 2023-06-17 NOTE — HOSPITAL COURSE
6/14:   HAGMA out of proportion to lactate and ketones.  Concern for toxic alcohol, possibly methanol given vision changes.  Given fomepizole.  Osmolal gap returned elevated.  Renal consulted and initiated HD.   JANIS Whelan azithro for RUL pna.  6/15:   Remains on vents.  Bicarb up to 30 and pt now alkalemic.  Bicarb drip stopped and ventilation significantly decreased.  Remains on abx.  6/20/2023 called for rapid response on the floor.  Patient had about 30 seconds of generalized convulsion.  Unresponsive grunting.  O2 sat 95% on room air.  ABG reviewed showed pH of 7.2 bicarb of 19 CO2 of 48 mm Hg.  Patient was in ICU intubated and transferred to telemetry floor on 06/16.      
Since stepdown, Labs returned on 6/16/2023 in the evening confirming presence of Methanol. Send out Volatile labs returned POSITIVE on 6/17/2023 for likely Methanol. Minimal change in mentation since being on the floor, pt has had difficulty sleeping, agitation, auditory and visual hallucinations. . Seroquel ordered for symptoms. Psych consulted for management.     PT/OT recommend rehab placement at this time  SLP recommend Soft & Bite Sized Diet - IDDSI Level 6, Thin liquids - IDDSI Level 0    06/19: Methanol level from 06/14 resulted at 123. Pt oriented x person, place, time but continues to have visual and auditory hallucinations. Pt seen with RN and mother at bedside. Per pt mother, pt has been having progressive tremors, anxiety and confusion since may but initially symptoms were attributed to work related anxiety. Pt mother reports no known ingestions or known self harm issues but reports that pt may have exposures at work as she works as a . Psychiatry reconsulted. [place] Poison Control/Toxicology notified re: case.     06/20: Per RN and mother at bedside, pt remained agitated and hallucinating overnight, did not sleep at all. Was evaluated by psychiatry who increased PM dose of seroquel. Was given IM Haldol 5 mg x 1 per night team for agitations. This morning, pt is not verbalizing on exam, continues to have twitching more pronounced in lower extremities, is not following commands. Will obtain stat CT head, EEG to eval seizures and consult neurology to eval. Hold all sedating meds including seroquel.   
Heterosexual

## 2023-06-17 NOTE — PT/OT/SLP PROGRESS
Speech Language Pathology Treatment    Patient Name:  Shania Tapia   MRN:  [id number]  Admitting Diagnosis: Metabolic acidosis    Recommendations:                 General Recommendations:  Dysphagia therapy, Speech/language therapy, and Cognitive-linguistic therapy  Diet recommendations:  Easy to Chew Diet - IDDSI Level 7, Liquid Diet Level: Thin liquids - IDDSI Level 0   Aspiration Precautions: Standard aspiration precautions   General Precautions: Standard, fall  Communication strategies:  provide increased time to answer    Assessment:     Shania Tapia is a 37 y.o. female with an SLP diagnosis of Aphasia, Dysphagia, and Cognitive-Linguistic Impairment.  She presents with poor functional recall, decrease attention to tasks, delayed processing, anomia deficits, and extended oral prep. She would benefit from continued speech therapy.    Subjective     Patient alert, cooperative, and seen at bedside with lunch. Mom present for session. Patient c/o all the food looking the same.    Pain/Comfort:  Pain Rating 1: 0/10  Pain Rating Post-Intervention 1: 0/10    Respiratory Status: Nasal cannula, flow 1 L/min    Objective:     Has the patient been evaluated by SLP for swallowing?   Yes  Keep patient NPO? No   Current Respiratory Status:        Observed po intake of lunch consisting of chopped meat, smooth puree, and thin liquids via straw. She exhibited extended oral prep. Patient with poor functional recall of daily events and situation recall. Decrease attention to tasks noted. Patient with improvement in word finding and processing time during structured conversational tasks. Brighten affect with eye contact present. ST to continue POC.    Goals:   Multidisciplinary Problems       SLP Goals          Problem: SLP    Goal Priority Disciplines Outcome   SLP Goal     SLP Ongoing, Progressing   Description: Patient will tolerate bedside po trials for diet upgrade  TPW complete further cognitive-linguistic  testing to assess current level of functioning                       Plan:     Patient to be seen:  2 x/week   Plan of Care expires:     Plan of Care reviewed with:  patient, mother   SLP Follow-Up:  Yes       Discharge recommendations:  rehabilitation facility     Time Tracking:     SLP Treatment Date:   06/17/23  Speech Start Time:  1101  Speech Stop Time:  1123     Speech Total Time (min):  22 min    Billable Minutes: Speech Therapy Individual 8 and Treatment Swallowing Dysfunction 8    06/17/2023

## 2023-06-18 LAB
ALBUMIN SERPL BCP-MCNC: 2.7 G/DL (ref 3.5–5.2)
ALP SERPL-CCNC: 115 U/L (ref 55–135)
ALT SERPL W/O P-5'-P-CCNC: 23 U/L (ref 10–44)
ANION GAP SERPL CALC-SCNC: 14 MMOL/L (ref 8–16)
ANISOCYTOSIS BLD QL SMEAR: SLIGHT
AST SERPL-CCNC: 36 U/L (ref 10–40)
BASOPHILS # BLD AUTO: 0.02 K/UL (ref 0–0.2)
BASOPHILS NFR BLD: 0.3 % (ref 0–1.9)
BILIRUB DIRECT SERPL-MCNC: 0.2 MG/DL (ref 0.1–0.3)
BILIRUB SERPL-MCNC: 0.4 MG/DL (ref 0.1–1)
BUN SERPL-MCNC: 13 MG/DL (ref 6–20)
CALCIUM SERPL-MCNC: 8.8 MG/DL (ref 8.7–10.5)
CHLORIDE SERPL-SCNC: 106 MMOL/L (ref 95–110)
CO2 SERPL-SCNC: 25 MMOL/L (ref 23–29)
CREAT SERPL-MCNC: 0.8 MG/DL (ref 0.5–1.4)
DIFFERENTIAL METHOD: ABNORMAL
EOSINOPHIL # BLD AUTO: 0 K/UL (ref 0–0.5)
EOSINOPHIL NFR BLD: 0.2 % (ref 0–8)
ERYTHROCYTE [DISTWIDTH] IN BLOOD BY AUTOMATED COUNT: 20.6 % (ref 11.5–14.5)
EST. GFR  (NO RACE VARIABLE): >60 ML/MIN/1.73 M^2
GIANT PLATELETS BLD QL SMEAR: PRESENT
GLUCOSE SERPL-MCNC: 96 MG/DL (ref 70–110)
HCT VFR BLD AUTO: 28.9 % (ref 37–48.5)
HGB BLD-MCNC: 8.6 G/DL (ref 12–16)
IMM GRANULOCYTES # BLD AUTO: 0.11 K/UL (ref 0–0.04)
IMM GRANULOCYTES NFR BLD AUTO: 1.7 % (ref 0–0.5)
LYMPHOCYTES # BLD AUTO: 0.9 K/UL (ref 1–4.8)
LYMPHOCYTES NFR BLD: 13.1 % (ref 18–48)
MAGNESIUM SERPL-MCNC: 2.1 MG/DL (ref 1.6–2.6)
MCH RBC QN AUTO: 21.1 PG (ref 27–31)
MCHC RBC AUTO-ENTMCNC: 29.8 G/DL (ref 32–36)
MCV RBC AUTO: 71 FL (ref 82–98)
MONOCYTES # BLD AUTO: 1.2 K/UL (ref 0.3–1)
MONOCYTES NFR BLD: 17.9 % (ref 4–15)
NEUTROPHILS # BLD AUTO: 4.4 K/UL (ref 1.8–7.7)
NEUTROPHILS NFR BLD: 66.8 % (ref 38–73)
NRBC BLD-RTO: 0 /100 WBC
PHOSPHATE SERPL-MCNC: 2 MG/DL (ref 2.7–4.5)
PLATELET # BLD AUTO: 209 K/UL (ref 150–450)
PLATELET BLD QL SMEAR: ABNORMAL
PMV BLD AUTO: ABNORMAL FL (ref 9.2–12.9)
POTASSIUM SERPL-SCNC: 3.1 MMOL/L (ref 3.5–5.1)
PROCALCITONIN SERPL IA-MCNC: 0.73 NG/ML
PROT SERPL-MCNC: 6.6 G/DL (ref 6–8.4)
RBC # BLD AUTO: 4.08 M/UL (ref 4–5.4)
SODIUM SERPL-SCNC: 145 MMOL/L (ref 136–145)
SPHEROCYTES BLD QL SMEAR: ABNORMAL
TARGETS BLD QL SMEAR: ABNORMAL
WBC # BLD AUTO: 6.54 K/UL (ref 3.9–12.7)

## 2023-06-18 PROCEDURE — 84100 ASSAY OF PHOSPHORUS: CPT | Performed by: INTERNAL MEDICINE

## 2023-06-18 PROCEDURE — 25000003 PHARM REV CODE 250: Performed by: STUDENT IN AN ORGANIZED HEALTH CARE EDUCATION/TRAINING PROGRAM

## 2023-06-18 PROCEDURE — 80076 HEPATIC FUNCTION PANEL: CPT | Performed by: NURSE PRACTITIONER

## 2023-06-18 PROCEDURE — 94761 N-INVAS EAR/PLS OXIMETRY MLT: CPT

## 2023-06-18 PROCEDURE — 80048 BASIC METABOLIC PNL TOTAL CA: CPT | Performed by: INTERNAL MEDICINE

## 2023-06-18 PROCEDURE — 25000003 PHARM REV CODE 250: Performed by: INTERNAL MEDICINE

## 2023-06-18 PROCEDURE — 51702 INSERT TEMP BLADDER CATH: CPT

## 2023-06-18 PROCEDURE — 99232 PR SUBSEQUENT HOSPITAL CARE,LEVL II: ICD-10-PCS | Mod: ,,, | Performed by: INTERNAL MEDICINE

## 2023-06-18 PROCEDURE — 63600175 PHARM REV CODE 636 W HCPCS: Performed by: NURSE PRACTITIONER

## 2023-06-18 PROCEDURE — 85025 COMPLETE CBC W/AUTO DIFF WBC: CPT | Performed by: NURSE PRACTITIONER

## 2023-06-18 PROCEDURE — 84145 PROCALCITONIN (PCT): CPT | Performed by: NURSE PRACTITIONER

## 2023-06-18 PROCEDURE — 63600175 PHARM REV CODE 636 W HCPCS: Performed by: STUDENT IN AN ORGANIZED HEALTH CARE EDUCATION/TRAINING PROGRAM

## 2023-06-18 PROCEDURE — 27000221 HC OXYGEN, UP TO 24 HOURS

## 2023-06-18 PROCEDURE — 99232 SBSQ HOSP IP/OBS MODERATE 35: CPT | Mod: ,,, | Performed by: INTERNAL MEDICINE

## 2023-06-18 PROCEDURE — 83735 ASSAY OF MAGNESIUM: CPT | Performed by: NURSE PRACTITIONER

## 2023-06-18 PROCEDURE — 63600175 PHARM REV CODE 636 W HCPCS: Performed by: INTERNAL MEDICINE

## 2023-06-18 PROCEDURE — 21400001 HC TELEMETRY ROOM

## 2023-06-18 RX ORDER — ACETAMINOPHEN 500 MG
1000 TABLET ORAL ONCE
Status: COMPLETED | OUTPATIENT
Start: 2023-06-18 | End: 2023-06-18

## 2023-06-18 RX ORDER — QUETIAPINE FUMARATE 100 MG/1
100 TABLET, FILM COATED ORAL NIGHTLY
Status: DISCONTINUED | OUTPATIENT
Start: 2023-06-18 | End: 2023-06-20

## 2023-06-18 RX ORDER — QUETIAPINE FUMARATE 25 MG/1
50 TABLET, FILM COATED ORAL NIGHTLY
Status: DISCONTINUED | OUTPATIENT
Start: 2023-06-18 | End: 2023-06-18

## 2023-06-18 RX ORDER — DIPHENHYDRAMINE HYDROCHLORIDE 50 MG/ML
25 INJECTION INTRAMUSCULAR; INTRAVENOUS ONCE
Status: COMPLETED | OUTPATIENT
Start: 2023-06-18 | End: 2023-06-18

## 2023-06-18 RX ORDER — DIPHENHYDRAMINE HYDROCHLORIDE 50 MG/ML
25 INJECTION INTRAMUSCULAR; INTRAVENOUS EVERY 6 HOURS PRN
Status: DISCONTINUED | OUTPATIENT
Start: 2023-06-18 | End: 2023-06-20

## 2023-06-18 RX ORDER — QUETIAPINE FUMARATE 50 MG/1
50 TABLET, EXTENDED RELEASE ORAL ONCE
Status: COMPLETED | OUTPATIENT
Start: 2023-06-18 | End: 2023-06-18

## 2023-06-18 RX ORDER — DIPHENHYDRAMINE HYDROCHLORIDE 50 MG/ML
25 INJECTION INTRAMUSCULAR; INTRAVENOUS ONCE
Status: DISCONTINUED | OUTPATIENT
Start: 2023-06-18 | End: 2023-06-18

## 2023-06-18 RX ORDER — HALOPERIDOL 5 MG/ML
5 INJECTION INTRAMUSCULAR ONCE
Status: COMPLETED | OUTPATIENT
Start: 2023-06-18 | End: 2023-06-18

## 2023-06-18 RX ORDER — LORAZEPAM 2 MG/ML
2 INJECTION INTRAMUSCULAR ONCE
Status: COMPLETED | OUTPATIENT
Start: 2023-06-18 | End: 2023-06-20

## 2023-06-18 RX ADMIN — CEFTRIAXONE 1 G: 1 INJECTION, POWDER, FOR SOLUTION INTRAMUSCULAR; INTRAVENOUS at 04:06

## 2023-06-18 RX ADMIN — ENOXAPARIN SODIUM 40 MG: 40 INJECTION SUBCUTANEOUS at 04:06

## 2023-06-18 RX ADMIN — FAMOTIDINE 20 MG: 20 TABLET, FILM COATED ORAL at 08:06

## 2023-06-18 RX ADMIN — SODIUM CHLORIDE: 9 INJECTION, SOLUTION INTRAVENOUS at 08:06

## 2023-06-18 RX ADMIN — THIAMINE HYDROCHLORIDE 100 MG: 100 INJECTION, SOLUTION INTRAMUSCULAR; INTRAVENOUS at 08:06

## 2023-06-18 RX ADMIN — MUPIROCIN: 20 OINTMENT TOPICAL at 08:06

## 2023-06-18 RX ADMIN — SODIUM CHLORIDE: 9 INJECTION, SOLUTION INTRAVENOUS at 04:06

## 2023-06-18 RX ADMIN — POTASSIUM PHOSPHATE, MONOBASIC AND POTASSIUM PHOSPHATE, DIBASIC 15 MMOL: 224; 236 INJECTION, SOLUTION, CONCENTRATE INTRAVENOUS at 01:06

## 2023-06-18 RX ADMIN — THERA TABS 1 TABLET: TAB at 08:06

## 2023-06-18 RX ADMIN — LABETALOL HYDROCHLORIDE 10 MG: 5 INJECTION INTRAVENOUS at 11:06

## 2023-06-18 RX ADMIN — SENNOSIDES AND DOCUSATE SODIUM 2 TABLET: 50; 8.6 TABLET ORAL at 08:06

## 2023-06-18 RX ADMIN — QUETIAPINE FUMARATE 100 MG: 100 TABLET ORAL at 09:06

## 2023-06-18 RX ADMIN — LORAZEPAM 2 MG: 2 INJECTION INTRAMUSCULAR; INTRAVENOUS at 02:06

## 2023-06-18 RX ADMIN — HALOPERIDOL LACTATE 5 MG: 5 INJECTION, SOLUTION INTRAMUSCULAR at 02:06

## 2023-06-18 RX ADMIN — MUPIROCIN: 20 OINTMENT TOPICAL at 09:06

## 2023-06-18 RX ADMIN — METOPROLOL TARTRATE 25 MG: 25 TABLET, FILM COATED ORAL at 08:06

## 2023-06-18 RX ADMIN — METOPROLOL TARTRATE 25 MG: 25 TABLET, FILM COATED ORAL at 09:06

## 2023-06-18 RX ADMIN — POTASSIUM CHLORIDE 10 MEQ: 750 TABLET, EXTENDED RELEASE ORAL at 02:06

## 2023-06-18 RX ADMIN — QUETIAPINE FUMARATE 50 MG: 50 TABLET, EXTENDED RELEASE ORAL at 08:06

## 2023-06-18 RX ADMIN — ACETAMINOPHEN 1000 MG: 500 TABLET ORAL at 04:06

## 2023-06-18 RX ADMIN — DIPHENHYDRAMINE HYDROCHLORIDE 25 MG: 50 INJECTION, SOLUTION INTRAMUSCULAR; INTRAVENOUS at 05:06

## 2023-06-18 RX ADMIN — ACETAMINOPHEN 650 MG: 325 TABLET ORAL at 02:06

## 2023-06-18 RX ADMIN — FAMOTIDINE 20 MG: 20 TABLET, FILM COATED ORAL at 09:06

## 2023-06-18 NOTE — NURSING
Pt getting combative, trying to get out of bed, Dr. Solis aware. Ordered Haloperidol IM and Lorazepam IV.  Restraints ordered but not placed yet.

## 2023-06-18 NOTE — PROGRESS NOTES
[facility] - Telemetry ([facility])  Nephrology  Progress Note    Patient Name: Shania Tapia  MRN: [id number]  Admission Date: 6/13/2023  Hospital Length of Stay: 4 days  Attending Provider: Tyshawn Solis MD   Primary Care Physician: Flavia Fink DO  Principal Problem:Metabolic acidosis  Reason for Consult: Metabolic Acidosis       Subjective:     History:   38 yo female admitted with profound metabolic acidosis with unmeasurable bicarb and pH 7.0, urgent dialysis. Osmolar gap noted, suspicion for alcohol and specifically methanol given vision changes     6/16  - feels vision is improving  - has no memory recall for past hospital events   - feels hungry, seen by ST and diet was recommended  - notes swelling of legs    6/17  - still with abnormal vision  - eating a little more     6/18  - active hallucinations during my visit,  at bedside reports similar  - Seroquel initiated     Review of patient's allergies indicates:   Allergen Reactions    Latex Rash    Latex, natural rubber Rash     Current Facility-Administered Medications   Medication Frequency    0.9%  NaCl infusion PRN    acetaminophen tablet 650 mg Q6H PRN    cefTRIAXone (ROCEPHIN) 1 g in dextrose 5 % in water (D5W) 5 % 100 mL IVPB (MB+) Q24H    dextrose 10% bolus 125 mL 125 mL PRN    dextrose 10% bolus 250 mL 250 mL PRN    enoxaparin injection 40 mg Daily    famotidine tablet 20 mg BID    labetaloL injection 10 mg Q6H PRN    melatonin tablet 6 mg Nightly PRN    metoprolol tartrate (LOPRESSOR) tablet 25 mg BID    multivitamin tablet Daily    mupirocin 2 % ointment BID    ondansetron injection 4 mg Q8H PRN    potassium phosphate 15 mmol in dextrose 5 % (D5W) 250 mL infusion Once    QUEtiapine tablet 50 mg QHS    senna-docusate 8.6-50 mg per tablet 2 tablet BID    thiamine (B-1) 100 mg in dextrose 5 % (D5W) 100 mL IVPB Daily       Objective:     Vital Signs (Most Recent):  Temp: 99.7 °F (37.6 °C) (06/18/23 1113)  Pulse: (!) 112 (06/18/23  1114)  Resp: 17 (06/18/23 1113)  BP: (!) 179/100 (06/18/23 1114)  SpO2: 99 % (06/18/23 1114) Vital Signs (24h Range):  Temp:  [97.9 °F (36.6 °C)-99.7 °F (37.6 °C)] 99.7 °F (37.6 °C)  Pulse:  [] 112  Resp:  [16-18] 17  SpO2:  [96 %-100 %] 99 %  BP: (153-181)/() 179/100     Weight: 68.4 kg (150 lb 12.7 oz) (06/16/23 0500)  Body mass index is 28.49 kg/m².  Body surface area is 1.72 meters squared.    I/O last 3 completed shifts:  In: 2398.8 [P.O.:360; I.V.:1072.6; IV Piggyback:966.2]  Out: 2500 [Urine:2500]    Physical Exam  Vitals and nursing note reviewed.   Constitutional:       General: She is not in acute distress.  HENT:      Head: Atraumatic.   Cardiovascular:      Rate and Rhythm: Tachycardia present.   Pulmonary:      Effort: Pulmonary effort is normal.      Breath sounds: No wheezing or rales.   Musculoskeletal:      Right lower leg: No edema.      Left lower leg: No edema.   Neurological:      Mental Status: She is alert.   Psychiatric:         Mood and Affect: Mood normal.       Significant Labs: reviewed      Assessment/Plan:     Active Diagnoses:    Diagnosis Date Noted POA    PRINCIPAL PROBLEM:  Metabolic acidosis [E87.20] 06/14/2023 Yes    Anxiety [F41.9] 06/16/2023 Yes    Hypokalemia [E87.6] 06/16/2023 Yes    Hypomagnesemia [E83.42] 06/16/2023 Yes    Hypophosphatemia [E83.39] 06/16/2023 Yes    High serum osmolar gap [R74.8] 06/15/2023 Yes    Visual hallucination [R44.1] 06/15/2023 Yes    Encephalopathy [G93.40] 06/14/2023 Yes    Pneumonia of right upper lobe due to infectious organism [J18.9] 06/14/2023 Yes    Acute respiratory failure with hypoxia [J96.01] 06/14/2023 Yes    Severe sepsis [A41.9, R65.20] 06/14/2023 Yes      Problems Resolved During this Admission:         Metabolic acidosis, AG at presentation with osmolar gap, required RRT for treatment  - methanol identified     Hypernatremia  - po intake to self correct    Hypophos/Hypokalemia/HypoMag  - phos and potassium under  repletion  - continue to monitor levels but as taking in po should improve       Tim Rangel MD  Nephrology

## 2023-06-18 NOTE — PLAN OF CARE
Problem: Adult Inpatient Plan of Care  Goal: Plan of Care Review  Outcome: Ongoing, Progressing  Goal: Patient-Specific Goal (Individualized)  Outcome: Ongoing, Progressing  Goal: Absence of Hospital-Acquired Illness or Injury  Outcome: Ongoing, Progressing  Goal: Optimal Comfort and Wellbeing  Outcome: Ongoing, Progressing  Goal: Readiness for Transition of Care  Outcome: Ongoing, Progressing     Problem: Infection  Goal: Absence of Infection Signs and Symptoms  Outcome: Ongoing, Progressing     Problem: Fall Injury Risk  Goal: Absence of Fall and Fall-Related Injury  Outcome: Ongoing, Progressing     Problem: Adjustment to Illness (Sepsis/Septic Shock)  Goal: Optimal Coping  Outcome: Ongoing, Progressing     Problem: Bleeding (Sepsis/Septic Shock)  Goal: Absence of Bleeding  Outcome: Ongoing, Progressing     Problem: Glycemic Control Impaired (Sepsis/Septic Shock)  Goal: Blood Glucose Level Within Desired Range  Outcome: Ongoing, Progressing     Problem: Infection Progression (Sepsis/Septic Shock)  Goal: Absence of Infection Signs and Symptoms  Outcome: Ongoing, Progressing     Problem: Nutrition Impaired (Sepsis/Septic Shock)  Goal: Optimal Nutrition Intake  Outcome: Ongoing, Progressing     Problem: Fluid Imbalance (Pneumonia)  Goal: Fluid Balance  Outcome: Ongoing, Progressing     Problem: Infection (Pneumonia)  Goal: Resolution of Infection Signs and Symptoms  Outcome: Ongoing, Progressing     Problem: Respiratory Compromise (Pneumonia)  Goal: Effective Oxygenation and Ventilation  Outcome: Ongoing, Progressing     Problem: Skin Injury Risk Increased  Goal: Skin Health and Integrity  Outcome: Ongoing, Progressing     Problem: Device-Related Complication Risk (Hemodialysis)  Goal: Safe, Effective Therapy Delivery  Outcome: Ongoing, Progressing     Problem: Hemodynamic Instability (Hemodialysis)  Goal: Effective Tissue Perfusion  Outcome: Ongoing, Progressing     Problem: Infection (Hemodialysis)  Goal:  Absence of Infection Signs and Symptoms  Outcome: Ongoing, Progressing

## 2023-06-18 NOTE — PLAN OF CARE
Patient updated on plan of care. Fall  and security precautions in place. Vitals every 4 hours. Bed alarm on,family at bedside. Patient remains free from falls. Education provided; questions encouraged and answered. Pt having visual/ auditory hallucination, got agitated trying to get out of bed- gave ativan and haloperidol, pt calmed down. Dr. Solis ordered prn benadryl. Restrains were ordered but not used, order dc'd.

## 2023-06-18 NOTE — CONSULTS
TELEPSYCHIATRY: SUBSEQUENT EVALUATION     ASSESSMENT AND PLAN:     DIAGNOSES & PROBLEMS:  Anxiety  Encephalopathy - resolving    In Summary:  - Patient with no significant past psych history but recently under increased stress.  With stated plan to see PCP and get started on anxiety medications - yet currently declining trial of lexapro and does not want to discuss pros/cons with me tonight.  There was concern for toxic ingestion upon admit given altered mental status, which has since resolved - family reporting back to baseline.  Patient denying and suicidal ideation and  also believes she is not a danger to self.     Plan:  - Attempted to speak with patient about lexapro trial but currently declining to do so.  Can reconsult psychiatry if she would like to talk more about this during the present admission.  Would also benefit from supportive psychotherapy as part of outpatient plan.  There does not appear to be any acute safety concerns at this time.     With reasonable medical certainty, based on a present-state examination complemented by information obtained via chart review, as well as available collateral information documented herein:  - the patient does not currently meet the criteria for psychiatric hospitalization  - the patient can be safely and effectively managed in a less restrictive level of care  - PEC is not indicated  - defer non-psychiatric medication(s) and management to the current provider(s) of record  - facility staff (e.g., case management, social work) to arrange for appropriate follow up care  - upon discharge, it is recommended to follow up with an outpatient provider within 1-2 weeks of discharge, but ideally as soon as possible  - contingent on accessibility and willingness, patient would benefit from the following referrals: outpatient psychiatry and outpatient psychotherapy       PRESENTATION:     Shania Tapia is a 37 y.o. patient seen for a follow up  psychiatric evaluation.  An interval history of the presenting illness (HPI) was obtained, and a pertinent psychiatric and medical review of systems was performed.    Per Chart:  Overview/Hospital Course:  Evaluated by psych, at the time patient not meeting criteria for PEC, however psych was not able to obtain collateral from .  They recommend PEC if  has any concern regarding suicide ideation/attempt.  They also recommend offering escitalopram 10 mg daily to help target anxiety.        adamantly denies any concern of deliberate attempts at self-harm although admits that she has been under a great deal of stress over the past couple of months in relation to her job.  States that at times her hands even shake due to the stress.  States that patient is not prescribed any medication for anxiety, although she plans to speak with her PCP on her next appointment.  However, she has been trying to treat her anxiety with over-the-counter supplements including Ashwaganda.  Additionally she has taken some anxiety meds which are prescribed to her mother and  to help control her anxiety.  However all these meds have been given freely, patient does not display any seeking behavior.  Per , patient is very cautious about taking any medication, subsequently he does not believe she would take something random without researching it thoroughly.    Anxiety  Patient admits to ongoing increased anxiety over the past several days prior to ED presentation, however  reports that this has been ongoing for at least a couple months she had had patient has attempted to try over-the-counter meds such as Ashwaganda and that she has tried some anxiety meds that have been prescribed to her mother and  with their knowledge  Discussed psych recommendations consideration for starting escitalopram 10 mg with patient.  Patient has not decided yet whether she would like to start escitalopram    Per  "Patient:  - Attempted to speak to patient tonight to check in on how she is doing  - Father in law in the room  - She declines an interview - "I'd rather speak at another time" - even when gently prompted  - Offered to speak with patient about pros/cons of initiating lexapro but she declines to do so at this time  - she is noted to be sitting calmly in bed, on her smartphone, in no apparent distress    Collateral:   Father-in-law in room but provides no further collateral.  See herein for collateral from  obtained earlier in stay.    REVIEW OF SYSTEMS:  I[x]I Patient unable or unwilling to provide any ROS.    CURRENT PSYCHOTROPIC REGIMEN:  I[]I Y  I[x]I N  I[]I U    Offered trial of lexapro but still contemplating      PERTINENT PAST HISTORY:     The patient's past psychiatric, family, social and medical history have been reviewed and updated as appropriate within the electronic medical record system.       The electronic chart was reviewed and updated as appropriate.  See [employer] for details.      Additional Relevant History, As Applicable:       EXAMINATION:     BP (!) 169/103 (BP Location: Right arm, Patient Position: Lying)   Pulse 109   Temp 98.7 °F (37.1 °C) (Oral)   Resp 18   Ht 5' 1" (1.549 m)   Wt 68.4 kg (150 lb 12.7 oz)   LMP  (LMP Unknown) Comment: last period less than 30 days ago per   SpO2 98%   Breastfeeding No   BMI 28.49 kg/m²     MENTAL STATUS EXAMINATION:  General Appearance & Behavior: in hospital garb, sitting up in bed, looking at iphone, minimally participative  Involuntary Movements and Motor Activity: no abnormal movements noted  Speech & Language: decreased spontaneity but conversational  Mood: unknown  Affect: calm   Thought Process & Associations: linear  Thought Content & Perceptions: no evidence of psychosis but not formally assessed given she declines interview  Sensorium and Cognition: alert, unable to fully assess sensorium and cognition but appears to have " clear sensorium  Insight & Judgment: unable to assess      RISK MANAGEMENT:     Risk Parameters:  I[]I Y  I[x]I N  I[]I U  I[]I A  Suicidal Ideation/Behavior: **   I[]I Y  I[x]I N  I[]I U  I[]I A  Homicidal Ideation/Behavior: **  I[]I Y  I[x]I N  I[]I U  I[]I A  Violence: **  I[]I Y  I[x]I N  I[]I U  I[]I A  Self-Injurious Behavior: **     The patient is deemed to be a reliable and factually accurate historian.    I[]I Y  I[]I N  I[]I U  I[]I A  I[x]I N/A  Minimization of Risk Parameters Suspected/Evident: **  I[]I Y  I[]I N  I[]I U  I[]I A  I[x]I N/A  Exaggeration of Risk Parameters Suspected/Evident: **    Current risk is judged to be:   I[x]I Low    I[]I Moderate   I[]I High    [] Y  [x] N  I[]I U  I[]I N/A  Danger to Self:   [] Y  [x] N  I[]I U  I[]I N/A  Danger to Others:   [] Y  [x] N  I[]I U  I[]I N/A  Grave Disability:        Mono Estevez MD  Department of Psychiatry, [facility] Board Certified, Psychiatry and Addiction Medicine      MANAGEMENT:     I[]I Y = Yes / Present / Endorses.  I[]I N = No / Absent / Denies.  I[]I U = Unknown / Unable to Assess / Unwilling to Participate.  I[]I A = Ambiguity Exists / Accuracy Uncertain.  I[]I D = Denial or Minimization is Suspected/Evident.  I[]I N/A = Non-Applicable.    Chart Review: Available documentation has been reviewed, and pertinent elements of the chart have been incorporated into this evaluation where appropriate.  [facility] encounter [facility]: Visit date not found.    [x] In cases of emergencies (e.g. SI/HI resulting in danger to self or others, functioning deteriorates to the level of grave disability), call 911 or [phone], or present to the emergency department for immediate assistance.  [x] Patient should not operate a motor vehicle or heavy machinery if effects of medications or underlying symptoms/condition impair the ability to safely do so.  [x] Comply with ANY/ALL medication fully as prescribed/instructed and report ANY/ALL suspected  adverse effects to appropriate health care providers.    Written material has been provided to supplement, augment, and reinforce any discussions and interventions, via the AVS and/or other pre-printed handouts.  Alcohol, Tobacco, and Drug Counseling, as well as applicable resources, has been provided, as warranted.  Shared medical decision making and informed consent are the hallmark and bedrock of good clinical care, and as such have been employed and obtained, respectively, to the degree possible.  Risk Mitigation Strategies, Harm Reduction Techniques, and Safety Netting are important interventions that can reduce acute and chronic risk, and as such have been employed to the degree possible.  Prescription Drug Management entails the review, recommendation, or consideration without recommendation of medications, and as such was employed during the encounter.  Additional Psychoeducation has been provided, as warranted.      -- Discussed, to the extent possible, diagnosis, risks and benefits of proposed treatment vs alternative treatments vs no treatment, potential side effects of these treatments and the inherent unpredictability of treatment. The patient's ability to understand, participate and engage in a conversation surrounding this was deemed to be: sufficient.    DIAGNOSTIC TESTING:     Blood Counts, Electrolytes & Glucose:   Lab Results   Component Value Date    WBC 7.93 06/17/2023    GRAN 5.9 06/17/2023    GRAN 74.5 (H) 06/17/2023    HGB 8.7 (L) 06/17/2023    HCT 28.7 (L) 06/17/2023    MCV 72 (L) 06/17/2023     (L) 06/17/2023     06/17/2023    K 3.0 (L) 06/17/2023    CALCIUM 8.8 06/17/2023    PHOS 2.9 06/17/2023    MG 2.0 06/17/2023    CO2 30 (H) 06/17/2023    ANIONGAP 11 06/17/2023     (H) 06/17/2023    HGBA1C 5.5 06/14/2023       Renal, Liver, Pancreas, Thyroid, Parathyroid, Prolactin, CPK, Lipids & Vitamin Levels:   Lab Results   Component Value Date    CREATININE 0.8 06/17/2023     BUN 9 06/17/2023    EGFRNORACEVR >60 06/17/2023    AST 58 (H) 06/17/2023    ALT 27 06/17/2023    ALKPHOS 121 06/17/2023    BILITOT 0.5 06/17/2023    ALBUMIN 2.5 (L) 06/17/2023    TSH 1.259 06/13/2023    CHOL 201 (H) 10/20/2022    TRIG 76 10/20/2022    LDLCALC 134.8 10/20/2022    HDL 51 10/20/2022       Therapeutic Drug Levels:   No results found for: LITHIUM, VALPROATE, CBMZ, LAMOTRIGINE, CLOZAPINE, NORCLOZAP, CLOZNORCLOZ    Addiction:   Lab Results   Component Value Date    PCDSOBENZOD Negative 06/13/2023    BARBITURATES Negative 06/13/2023    PCDSCOMETHA Negative 06/13/2023    OPIATESCREEN Presumptive Positive (A) 06/13/2023    COCAINEMETAB Negative 06/13/2023    AMPHETAMINES Negative 06/13/2023    MARIJUANATHC Negative 06/13/2023    PCDSOPHENCYN Negative 06/13/2023    ALCOHOLMEDIC <10 06/13/2023       Results for orders placed or performed during the hospital encounter of 06/13/23   EKG 12-lead    Collection Time: 06/13/23  2:00 PM    Narrative    Test Reason : R00.0,    Vent. Rate : 134 BPM     Atrial Rate : 134 BPM     P-R Int : 144 ms          QRS Dur : 104 ms      QT Int : 384 ms       P-R-T Axes : 059 047 029 degrees     QTc Int : 573 ms    Sinus tachycardia  Possible Left atrial enlargement  Incomplete right bundle branch block  Nonspecific ST and T wave abnormality  Abnormal ECG  No previous ECGs available  Confirmed by SO BENTON MD ([id number]) on 6/13/2023 3:49:47 PM    Referred By: [email or web address]   SELF           Confirmed By:SO BENTON MD       Results for orders placed or performed during the hospital encounter of 06/13/23   CT Head Without Contrast    Narrative    EXAMINATION:  CT HEAD WITHOUT CONTRAST    CLINICAL HISTORY:  Mental status change, unknown cause;    TECHNIQUE:  Standard brain CT protocol without IV contrast was performed.    COMPARISON:  None    FINDINGS:  The ventricles have a normal size, position, and appearance. There is no abnormal intracranial mass or intracranial hemorrhage. There is no  skull fracture. The paranasal sinuses are normal in appearance.      Impression    Normal study.    All CT scans at this facility use dose modulation, iterative reconstruction, and/or weight base dosing when appropriate to reduce radiation dose when appropriate to reduce radiation dose to as low as reasonably achievable.      Electronically signed by: Carlos Enrique Castro MD  Date:    06/13/2023  Time:    15:09       TELEPSYCHIATRY:     Patient agreeable to consultation via telepsychiatry.    This consultation was requested by Tyshawn Solis MD, the patient's treating provider.  The location of the consulting psychiatrist is: [place], [place]  The patient location is:  [facility] TELEMETRY  Consultation Setting: hospital  Also present with the patient at the time of the evaluation: relative(s)    Consults    Complexity (level) of medical decision making employed in the encounter: MODERATE    Consult Start Time: 6/17/2023 10:00 PM CDT  Consult End Time: 6/17/2023 10:20 PM CDT  Time with Patient: 5 minutes  Total Time Spent Providing E/M Services: 20 minutes

## 2023-06-18 NOTE — DISCHARGE INSTRUCTIONS
Thank you for allowing me to participate as part of your health care team, and thank you for choosing [facility].    SHERI LARSON MD  Board Certified in Psychiatry & Addiction Medicine      IN CASE OF SUICIDAL THINKING, call the [place] Suicide Hotline Number: 988    988 Suicide & Crisis Lifeline: 988 , 2-319-984-TALK (8255)  [email or web address]           AFTER VISIT INSTRUCTIONS:     [x] Take all medication, from all providers, as prescribed.  [x] If questions or concerns arise, or if experiencing side effects, adverse reactions or worsening symptoms, contact your provider through the [facility] portal at [email or web address], or call [phone] to reach the [facility] main line.  [x] In cases of emergencies, call [phone] or [phone], or present directly to the emergency department for immediate assistance.    - Post-Discharge Recommendations: outpatient psychiatry and outpatient psychotherapy    INFORMATION ON MENTAL HEALTH MEDICATIONS:     National [place] of Mental Health:   https://www.nimh.nih.gov/health/topics/mental-health-medications     Web MD:   https://www.[employer].com       RESOURCES:     IN CASE OF SUICIDAL THINKING, call the [facility] Suicide Hotline Number: 988    988 Suicide & Crisis Lifeline: 988 , 4-308-525-TALK (8255)  Provides 24/7, free and confidential support for people in distress, prevention and crisis resources for you or your loved ones, and best practices for professionals.    Call, text or chat.  [email or web address]     National Action [place] for Suicide Prevention: the National Action [place] for Suicide Prevention (Action Hasty) is the nations public-private partnership for suicide prevention, working with more than 250 national partners.   https://theactionalliance.org     National Strategy for Suicide Prevention & Risk Mitigation:  https://theactionalliance.org/our-strategy/national-strategy-suicide-prevention     [x] Fact Sheet:    https://www.[facility].gov/sites/default/files/national-strategy-for-suicide-prevention-factsheet.pdf     [x] Report:   https://www.ncbi.nlm.nih.gov/books/AHV339251/pdf/Bookshelf_NBK109917.pdf     Suicide Prevention Resource Center: The Suicide Prevention Resource Center (SPR) is the only federally supported resource center devoted to advancing the implementation of the National Strategy for Suicide Prevention. [facility] is funded by the U.S. Department of Health and Human Services' Substance Abuse and Mental Health Services Administration ([facility]).  https://www.[facility].org     [x] Safety Plan:   https://[employer]/wp-content/uploads/2021/08/Alejandro-Safety-Plan-8-6-21.pdf     [x] Suicide Risk Curve:  https://[employer]/wp-content/uploads/2021/08/Tmfuwtw-uyhh-uogim-8-6-21.pdf     [place] Mental Health Advocacy Service: the state agency tasked with protecting the legal rights of people with behavioral health diagnoses.  https://mhas.[place].[facility]     Alcoholics Anonymous (AA): find a meeting near you.  https://www.aa.org     SMI Adviser: resources for individuals and families with serious mental illness.  https://smiadviser.org     National [place] for the Mentally Ill (ELIA): the nation's largest grassroots organization dedicated to building better lives for individuals with mental illness.  https://www.elia.org/Home     U.S. Department of Health and Human Services (HHS): the mission of HHS is to enhance the health and well-being of all Americans, by providing for effective health and human services and by fostering sound, sustained advances in the sciences underlying medicine, public health, and .   https://www.hhs.gov     Substance Abuse and Mental Health Services Administration (SAMHSA): [facility] is the agency within [facility] that leads public health efforts to advance the behavioral health of the nation. SAMHSA's mission is to reduce the impact of substance abuse and mental illness  on [place] communities.   https://www.samhsa.gov     National Institutes of Health ([facility]): a part of [facility], [facility] is the largest biomedical research agency in the world.   https://www.nih.gov     National [place] on Drug Abuse (RICHARD): sponsored by the NIH, the mission of RICHARD is to advance science on drug use and addiction and to apply that knowledge to improve individual and public health.  https://richard.nih.gov     National [place] on Alcohol Abuse and Alcoholism (NIAAA): sponsored by the NIH, the mission of NIAA is to generate and disseminate fundamental knowledge about the effects of alcohol on health and well-being, and apply that knowledge to improve diagnosis, prevention, and treatment of alcohol-related problems, including alcohol use disorder, across the lifespan.   https://www.niaaa.nih.gov     National Harm Reduction Coalition: resources for harm reduction, including techniques, strategies, policy, and advocacy.  https://harmreduction.org     The SHARE Approach - A Model for Shared Decision Making:  [x] Fact Sheet  https://www.ahrq.gov/sites/default/files/publications/files/share-approach_factsheet.pdf     AMA Principles of Medical Ethics - Informed Consent & Shared Decision Making:  [x] Chapter  https://www.ama-assn.org/system/files/2019-06/code-of-medical-cmqksu-hqxyrbg-2.pdf     Safety Netting for Primary Care:  [x] Article  https://www.ncbi.nlm.nih.gov/pmc/articles/OQJ7357486/pdf/vmwsazy-0782--e70.pdf       MEDICATION MANAGEMENT:     [x] In addition to the potential beneficial effects, the use of any medication or drug (prescribed, over the counter or otherwise) carries with it the risk of potential adverse effects.  Each has a set of typical adverse effects - some common, some rare - but idiosyncratic and unanticipated reactions unique to you are always possible.      [x] It is important to remember that untreated illness can also pose a risk, which must be taken into account when weighing the  pros and cons of a medication trial.    [x] Medications and drugs can sometimes interact with each other in the body, leading to adverse effects - it is important that all your providers know all the medications and drugs you take - prescribed, over the counter, or otherwise.  Keep all your practitioners up to date with any changes.  It's always a good idea to keep an up-to-date list in an easily accessible location.    [x] There is an inherent unpredictability to all treatment, including the use of medication.  Unexpected outcomes can occur - keep me up to date with any difficulties you encounter.    [x] It is important to take medication as directed, and to comply fully with the instructions.  Check with the appropriate provider first before adjusting or stopping your medication on your own.    If you require further information pertaining to the issues outlined above, please reach out to your providers through the [facility] portal at [email or web address], or call [phone] to discuss.  See resource list for additional material.     Additional information can be provided pertaining to your diagnosis, intended outcomes, target symptoms for treatment, and possible benefits and risks of medication - you can also access this information through the provided resources.  Possible alternatives to the current treatment plan (including no treatment) can also be reviewed.      GENERAL HEALTH & WELLNESS:     [x] Establish and follow regularly with a primary care physician for routine health maintenance and management of any medical comorbidities.  [x] Follow a healthy diet, exercise routinely, and monitor weight and metabolic parameters.  [x] Allow adequate time for sleep and practice good sleep hygiene.  [x] Do not operate a motor vehicle or heavy machinery if the effects of medications or the symptoms underlying your condition impair the ability for you to do so safely.    Dietary Guidelines for Americans,  7150-3126:  U.S. Department of Agriculture (USDA)  https://www.dietaryguidelines.gov/sites/default/files/2020-12/Dietary_Guidelines_for_Americans_2020-2025.pdf#page=31     The Nutrition Source:  [facility] of Public Health  https://www.[facility].[place].[facility]/nutritionsource       SLEEP HYGIENE:     Follow these tips to establish healthy sleep habits:  [x] Keep a consistent sleep schedule. Get up at the same time every day, even on weekends or during vacations.  [x] Set a bedtime that is early enough for you to get at least 7-8 hours of sleep.  [x] Don't go to bed unless you are sleepy.  [x] If you don't fall asleep after 20 minutes, get out of bed. Go do a quiet activity without a lot of light exposure. It is especially important to not get on electronics.  [x] Establish a relaxing bedtime routine.  [x] Use your bed only for sleep and sex.  [x] Make your bedroom quiet and relaxing. Keep the room at a comfortable, cool temperature.  [x] Limit exposure to bright light in the evenings.  [x] Turn off electronic devices at least 30 minutes before bedtime.  [x] Don't eat a large meal before bedtime. If you are hungry at night, eat a light, healthy snack.  [x] Exercise regularly and maintain a healthy diet.  [x] Avoid consuming caffeine in the afternoon or evening.  [x] Avoid consuming alcohol before bedtime.  [x] Reduce your fluid intake before bedtime.    QUICK TIPS FOR BETTER SLEEP  Reduce smartphone usage Create and maintain a nightly ritual Avoid caffeine 4-6 hours before sleeping Don't eat or drink too much at bedtime Sleep at the same time every night        American Academy of Sleep Medicine - Healthy Sleep Habits:  https://sleepeducation.org/healthy-sleep/healthy-sleep-habits     American Academy of Sleep Medicine - Bedtime Calculator:  https://sleepeducation.org/healthy-sleep/bedtime-calculator     American Academy of Sleep Medicine - Cognitive Behavioral Therapy for Insomnia  (CBT-I):  https://sleepeducation.org/patients/cognitive-behavioral-therapy     American Academy of Sleep Medicine - Insomnia:  https://sleepeducation.org/sleep-disorders/insomnia       ALCOHOL & DRUG USE COUNSELING:     Preventing Excessive Alcohol Use (CDC):  https://www.cdc.gov/alcohol/fact-sheets/moderate-drinking.htm#:~:text=To%20reduce%20the%20risk%20of,days%20when%20alcohol%20is%20consumed.     [x] Alcohol consumption is associated with a variety of short- and long-term health risks, including motor vehicle crashes, violence, sexual risk behaviors, high blood pressure, and various cancers (e.g., breast cancer).  [x] The risk of these harms increases with the amount of alcohol you drink. For some conditions, like some cancers, the risk increases even at very low levels of alcohol consumption (less than 1 drink).  [x] To reduce the risk of alcohol-related harms, the 1627-3130 Dietary Guidelines for Americans recommends that adults of legal drinking age can choose not to drink, or to drink in moderation by limiting intake to 2 drinks or less in a day for men or 1 drink or less in a day for women, on days when alcohol is consumed.  [x] The Guidelines also do not recommend that individuals who do not drink alcohol start drinking for any reason and that if adults of legal drinking age choose to drink alcoholic beverages, drinking less is better for health than drinking more.  [x] The Guidelines note that some people should not drink alcohol at all, such as:  - If they are pregnant or might be pregnant.  - If they are younger than age 21.  - If they have certain medical conditions or are taking certain medications that can interact with alcohol.  - If they are recovering from an alcohol use disorder or if they are unable to control the amount they drink.  [x] The Guidelines also note that not drinking alcohol is the safest option for women who are lactating. Generally, moderate consumption of alcoholic beverages by a  "woman who is lactating (up to 1 standard drink in a day) is not known to be harmful to the infant, especially if the woman waits at least 2 hours after a single drink before nursing or expressing breast milk. Women considering consuming alcohol during lactation should talk to their healthcare provider.  [x] The Guidelines note, Emerging evidence suggests that even drinking within the recommended limits may increase the overall risk of death from various causes, such as from several types of cancer and some forms of cardiovascular disease. Alcohol has been found to increase risk for cancer, and for some types of cancer, the risk increases even at low levels of alcohol consumption (less than 1 drink in a day).  [x] Although past studies have indicated that moderate alcohol consumption has protective health benefits (e.g., reducing risk of heart disease), recent studies show this may not be true.  [x] Its important to focus on the amount people drink on the days that they drink. Even if women consume an average of 1 drink per day or men consume an average of 2 drinks per day, binge drinking increases the risk of experiencing alcohol-related harm in the short-term and in the future.    Drinking Levels Defined (NIAAA):  https://www.niaaa.nih.gov/alcohol-health/overview-alcohol-consumption/moderate-binge-drinking     Drinking in Moderation:  According to the "Dietary Guidelines for Americans 8120-0831, U.S. Department of Health and Human Services and U.S. Department of Agriculture, adults of legal drinking age can choose not to drink or to drink in moderation by limiting intake to 2 drinks or less in a day for men and 1 drink or less in a day for women, when alcohol is consumed. Drinking less is better for health than drinking more.    Binge Drinking:  NIAAA defines binge drinking as a pattern of drinking alcohol that brings blood alcohol concentration (SOLEDAD) to 0.08 percent - or 0.08 grams of alcohol per deciliter - " or higher.  For a typical adult, this pattern corresponds to consuming 5 or more drinks (male), or 4 or more drinks (female), in about 2 hours.    The Substance Abuse and Mental Health Services Administration (SAMHSA), which conducts the annual National Survey on Drug Use and Health (NSDUH), defines binge drinking as 5 or more alcoholic drinks for males or 4 or more alcoholic drinks for females on the same occasion (i.e., at the same time or within a couple of hours of each other) on at least 1 day in the past month.    Heavy Alcohol Use:  NIAAA defines heavy drinking as follows:  - For men, consuming more than 4 drinks on any day or more than 14 drinks per week.  - For women, consuming more than 3 drinks on any day or more than 7 drinks per week.     [facility] defines heavy alcohol use as binge drinking on 5 or more days in the past month.    Patterns of Drinking Associated with Alcohol Use Disorder:  Binge drinking and heavy alcohol use can increase an individual's risk of alcohol use disorder.    Certain people should avoid alcohol completely, including those who:  - Plan to drive or operate machinery, or participate in activities that require skill, coordination, and alertness.  - Take certain over-the-counter or prescription medications.  - Have certain medical conditions.  - Are recovering from alcohol use disorder or are unable to control the amount that they drink.  - Are younger than age 21.  - Are pregnant or may become pregnant.    U.S. Standard Drink  12 oz beer   (5% ABV) 8 oz malt liquor   (7% ABV) 5 oz wine   (12% ABV) 1.5 oz 80-proof distilled spirit  (40% ABV)        Heroin use harm reduction:  1. Carry naloxone. When using heroin, make sure you have at least one dose of naloxone - the overdose reversal drug - and have it in plain view. Understand how to give it.  2. Try a small dose first. It is best to first try a small amount of the heroin to check the effect.  3. Dont use heroin alone. Always use  heroin with someone else and take turns while using.    It is possible to overdose with heroin whether you are snorting, injecting or using it in another form.    Signs of an overdose or emergency:   - The person is awake but unable to talk.  - Their body is limp.  - Their breathing is shallow or slow or stopped.  - Their skin is pale, ashen or clammy/sweaty.  - They are unconscious.    In case of emergency, give naloxone. If you suspect the heroin may contain fentanyl, administer more than one dose. Seek medical help even if naloxone has been given. Call 911 for help.      ADHD TREATMENT AND STIMULANT MEDICATIONS:     [facility] Prescription Stimulants Drug Facts  CMS Stimulant and Related Medications: Use in Adults  SALAS Drug Fact Sheets: Stimulants  FDA Drug Safety Communication: Stimulants  [facility] ADHD  WebMD ADHD Medications and Side Effects  [facility]: ADHD Medication      SHARED DECISION MAKING & INFORMED CONSENT:     Shared medical decision making and informed consent are the hallmark and bedrock of excellent clinical care.  During the encounter, shared medical decision making was employed and informed consent was obtained, to the degree possible, whenever feasible, appropriate and relevant. Those interventions are supplemented here with written materials, detailing the topics in more depth.       PSYCHOEDUCATION:     Psychoeducation pertaining to the following -     Diagnosis Etiology Disease Processes Natural Progression   Treatment Options Time Course Safety Netting Informed Consent   Intended Benefits of Medication Expectable Adverse Effects Target Symptoms for Treatment Alternatives to Current Treatment   Shared   Decision Making Risk Mitigation Strategies Harm Reduction Techniques Associated Bio-Med Complications     - can be further discussed and reviewed (you can also access additional information through the provided resources in this document).      Effective communication is essential in order to  engage in shared medical decision making.  If you had difficulty understanding anything during your encounter or in this supplementary document, please contact your providers through the [facility] portal at [email or web address] or call [phone].     [place] Dictionary  https://dictionary.ludin.org/us       It can be easy to miss, forget, or misremember important important information that was discussed during the session - especially when you're stressed, upset, or don't feel well.  If you or a representative have any additional questions, concerns, or topics to discuss - please contact your providers through the [facility] portal at [email or web address] or call [phone].    Memory Loss  https://www.Rebit.Aluwave/brain/memory-loss    Causes of Memory Loss  https://www.[employer]/what-causes-memory-loss-3141276    Memory loss: When to seek help  https://www.[facility].org/diseases-conditions/alzheimers-disease/in-depth/memory-loss/art-57736246    Memory, Forgetfulness, and Aging: What's Normal and What's Not?  https://www.elly.nih.gov/health/memory-forgetfulness-and-aging-whats-normal-and-whats-not    Depression and Memory Loss  https://www.[employer]/health/depression/depression-and-memory-loss    The Relationship Between Anxiety and Memory Loss  https://www.[employer].[facility]/academics/blog-posts/the-relationship-between-anxiety-and-memory-loss     PRESCRIPTION DRUG MANAGEMENT:     Prescription Drug Management entails the following:  [x] The review, recommendation, or consideration without recommendation of medications during the encounter.  [x] Discussion (to the extent possible) with the patient and/or other interested parties of the diagnosis, target symptoms, intended outcomes, and possible benefits and risks of medication, as well as alternatives (including no treatment), if not otherwise known or stated prior.  [x] Discussion (to the extent possible) with the patient and/or other interested  parties of possible expectable adverse effects of any proposed individual psychotropic agents, as well as the inherent unpredictability of treatment, if not otherwise known or stated prior.  [x] Informed consent is sought from the patient (and/or guardian/designated decision maker, if applicable) after a thorough discussion (to the extent possible) of the aforementioned points outlined above.  [x] The provision of counseling (to the extent possible) to the patient and/or other interested parties on the importance of full compliance with any prescribed medication, if not otherwise known or stated prior.    Information on psychotropic medication can be found at:   National [place] of Mental Health: Information on Mental Health Medications      RISK MITIGATION, HARM REDUCTION & SAFETY NETTING:     Risk Mitigation Strategies, Harm Reduction Techniques, and Safety Netting are important interventions that can reduce acute and chronic risk.  As such, opportunities were sought to incorporate psychoeducation and practical advice pertaining to these topics into the encounter, to the degree possible, whenever feasible, appropriate and relevant.  Those interventions are supplemented here with written materials, detailing the topics in more depth.       RISK MITIGATION STRATEGIES:     Risk mitigation strategies are used to reduce the likelihood of future episodes of suicide, homicide, violence, and/or other problematic behaviors (e.g. self-injurious, risky, addictive, compulsive, impulsive). The following are examples of risk mitigation strategies which you can employ in order to reduce your overall burden of risk.     [x] Treatment of underlying psychopathology driving acute and chronic risk to the extent possible.  [x] Use of self administered rating scales and journaling to assist in risk tracking.  [x] Exploration of protective factors to potentially counterbalance risk.  [x] Identification and avoidance of triggers and  situations that increase risk, including excessive alcohol and drug use.  [x] Timely follow up and ongoing treatment of mental health issues moving forward.  [x] Full compliance with medication regimen.  [x] A good working knowledge of your medication regimen, including specific instructions on the administration of the medications.  [x] Consultation with an appropriate medical provider prior to altering or deviating from these instructions on your own.  [x] Active involvement and participation of family and natural support wherever feasible and possible.  [x] Development and review of coping strategies that can be immediately deployed in times of acute crisis.  [x] Implementation of home safety practices and the removal/reduction of access to lethal means (including, but not limited to, firearms, certain types and quantities of medication, poisons, or other methods you may have contemplated or identified).  [x] Collaborative development of a written safety plan with your treatment team and loved ones that can be immediately referred to in times of acute crisis.  [x] Utilization of a safety contract to engage your treatment team and further assess/manage risk.  [x] A good working knowledge of how to access emergency treatment in times of acute crisis.  [x] Utilization of suicide hotlines number (988) and resources in times of crisis.    If you require further information pertaining to the issues outlined above, please reach out to your providers through the [facility] portal at [email or web address], or call [phone] to discuss.  See resource list for additional material.      SAFETY NETTING:     In healthcare, safety netting refers to the provision of information to help patients or carers identify the need to consult a health care professional if a health concern arises or changes.  The relevance of this advice is most obvious with chronic mental illnesses, as their dynamic nature, with symptoms and signs  emerging at different times and in different combinations, makes safety netting particularly important.  Specific safety net advice for you includes the following:    [x] The existence of uncertainty. Mental health diagnoses and conditions contain at least some degree of uncertainty - knowing this, you should feel empowered to reconsult if necessary.  [x] What exactly to look out for. Given the recognised risk of possible deterioration or the development of complications, you should become familiar with the specific clinical features (including red flags) to look out for.    [x] How exactly to seek further help. You should know how and where to seek further help if needed.  Make a plan in advance and keep it handy.  It's also a good idea to share the plan with your treatment providers and loved ones.  [x] What to expect about time course. Mental health diagnoses and conditions often have an expected time course, which is important information for you to know.  However, if your difficulties do not conform to this time line and concerns arise, do not delay seeking further medical advice.    If you require further information pertaining to the issues outlined above, please reach out to your providers through the [facility] portal at [email or web address], or call [phone] to discuss.  See resource list for additional material.      HARM REDUCTION:     Harm Reduction techniques are used in an effort to reduce negative consequences associated with risky and maladaptive behaviors, until cessation of the problematic behaviors can be established.  Harm reduction is best thought of as a journey and not a destination; it is not an endorsement of problematic behavior, but an acknowledgement and recognition of the step-by-step nature of recovery.      Although commonly employed in working with people who suffer with drug addiction, harm reduction can be more broadly applied to any problematic behavior.    Harm Reduction and  Substance Abuse:  [x] Incorporates a spectrum of strategies that includes safer use, managed use, abstinence, meeting people who use drugs where theyre at, and addressing conditions of use along with the use itself.  [x] Accepts, for better or worse, that licit and illicit drug use is part of our world and chooses to work to minimize its harmful effects rather than simply ignore or condemn them.  [x] Understands drug use as a complex, multi-faceted phenomenon that encompasses a continuum of behaviors from severe use to total abstinence, and acknowledges that some ways of using drugs are clearly safer than others.  [x] Calls for the non-judgmental, non-coercive provision of services and resources to people who use drugs and the communities in which they live in order to assist them in reducing attendant harm.  [x] Affirms people who use drugs themselves as the primary agents of reducing the harms of their drug use and seeks to empower them to share information and support each other in strategies which meet their actual conditions of use.  [x] Does not attempt to minimize or ignore the real and tragic harm and danger that can be associated with illicit drug use.  [x] Meets people where they are, but seeks to not leave them there.  [x] Examples of specific interventions include, but are not limited to, narcan (naloxone), medication assisted treatment, syringe access, overdose prevention, and safer drug use techniques.    Key Harm Reduction Strategies: Opioid Use Disorder  [x] Safe Injection Sites & Equipment  [x] Managed Use  [x] Syringe Exchange Programs  [x] Fentanyl Test Strips  [x] Pharmacotherapy/Medication Assisted Treatment  [x] Narcan  [x] Good Muslim Laws  [x] Treatment Instead of nursing home  [x] Diversion Programs  [x] Overdose Education  [x] Abstinence    Whether or not you struggle with substance abuse, any and all opportunities to employ harm reduction techniques to address difficult to change  "problematic behaviors should be sought and implemented - whenever and wherever feasible, relevant and applicable. Additionally, harm reduction techniques can be applied broadly, and are relevant for a multitude of situations - even those that do not involve problematic or maladaptive behaviors.     EXAMPLES OF HARM REDUCTION IN OTHER AREAS  SUN SCREEN SEAT BELTS SPEED LIMITS BIRTH CONTROL        If you require further information pertaining to the issues outlined above, please reach out to your providers through the [facility] portal at [email or web address], or call [phone] to discuss.  See resource list for additional material.      FIREARM SAFETY:     THE SIX BASIC GUN SAFETY RULES  There are six basic gun safety rules for gun owners to understand and practice at all times:  Treat all guns as if they are loaded. Always assume that a gun is loaded even if you think it is unloaded. Every time a gun is handled for any reason, check to see that it is unloaded. If you are unable to check a gun to see if it is unloaded, leave it alone and seek help from someone more knowledgeable about guns.  Keep the gun pointed in the safest possible direction. Always be aware of where a gun is pointing. A "safe direction" is one where an accidental discharge of the gun will not cause injury or damage. Only point a gun at an object you intend to shoot. Never point a gun toward yourself or another person.  Keep your finger off the trigger until you are ready to shoot. Always keep your finger off the trigger and outside the trigger guard until you are ready to shoot. Even though it may be comfortable to rest your finger on the trigger, it also is unsafe. If you are moving around with your finger on the trigger and stumble or fall, you could inadvertently pull the trigger. Sudden loud noises or movements can result in an accidental discharge because there is a natural tendency to tighten the muscles when startled. The trigger is " for firing and the handle is for handling.  Know your target, its surroundings and beyond. Check that the areas in front of and behind your target are safe before shooting. Be aware that if the bullet misses or completely passes through the target, it could strike a person or object. Identify the target and make sure it is what you intend to shoot. If you are in doubt, DON'T SHOOT! Never fire at a target that is only a movement, color, sound or unidentifiable shape. Be aware of all the people around you before you shoot.  Know how to properly operate your gun. It is important to become thoroughly familiar with your gun. You should know its mechanical characteristics including how to properly load, unload and clear a malfunction from your gun. Obviously, not all guns are mechanically the same. Never assume that what applies to one make or model is exactly applicable to another. You should direct questions regarding the operation of your gun to your firearms dealer, or contact the  directly.  Store your gun safely and securely to prevent unauthorized use. Guns and ammunition should be stored separately. When the gun is not in your hands, you must still think of safety. Use an approved firearms safety device on the gun, such as a trigger lock or cable lock, so it cannot be fired. Store it unloaded in a locked container, such as an approved lock box or a gun safe. Store your gun in a different location than the ammunition. For maximum safety you should use both a locking device and a storage container.    ADDITIONAL SAFETY POINTS  The six basic safety rules are the foundational rules for gun safety. However, there are additional safety points that must not be overlooked.  [x] Never handle a gun when you are in an emotional state such as anger or depression. Your judgment may be impaired. If you have acute or chronic suicidal ideation, a suicide plan, or suicidal intent, have firearms removed and your access  "restricted by a trusted loved one or other responsible individual or agency.  [x] Never shoot a gun in celebration (the Fourth of July or New Year's Faby, for example). Not only is this unsafe, but it is generally illegal. A bullet fired into the air will return to the ground with enough speed to cause injury or death.  [x] Do not shoot at water, flat or hard surfaces. The bullet can ricochet and hit someone or something other than the target.  [x] Hand your gun to someone only after you verify that it is unloaded and the cylinder or action is open. Take a gun from someone only after you verify that it is unloaded and the cylinder or action is open.  [x] Guns, alcohol and drugs don't mix. Alcohol and drugs can negatively affect judgment as well as physical coordination. Alcohol and any other substance likely to impair normal mental or physical functions should not be used before or while handling guns. Avoid handling and using your gun when you are taking medications that cause drowsiness or include a warning to not operate machinery while taking this drug.   [x] The loud noise from a fired gun can cause hearing damage, and the debris and hot gas that is often emitted can result in eye injury. Always wear ear and eye protection when shooting a gun.      GUNS AND CHILDREN - FIREARM OWNER RESPONSIBILITIES    You Cannot Be Too Careful with Children and Guns  [x] There is no such thing as being too careful with children and guns. Never assume that simply because a toddler may lack finger strength, they can't pull the trigger. A child's thumb has twice the strength of the other fingers. When a toddler's thumb "pushes" against a trigger, invariably the barrel of the gun is pointing directly at the child's face. NEVER leave a firearm lying around the house.  [x] Child safety precautions still apply even if you have no children or if your children have grown to adulthood and left home. A nephew, niece, neighbor's child or a " "grandchild may come to visit. Practice gun safety at all times.  [x] To prevent injury or death caused by improper storage of guns in a home where children are likely to be present, you should store all guns unloaded, lock them with a firearms safety device and store them in a locked container. Ammunition should be stored in a location separate from the gun.    Talking to Children About Guns  [x] Children are naturally curious about things they don't know about or think are "forbidden." When a child asks questions or begins to act out "gun play," you may want to address his or her curiosity by answering the questions as honestly and openly as possible. This will remove the mystery and reduce the natural curiosity. Also, it is important to remember to talk to children in a manner they can relate to and understand. This is very important, especially when teaching children about the difference between "real" and "make-believe." Let children know that, even though they may look the same, real guns are very different than toy guns. A real gun will hurt or kill someone who is shot.    Instill a Mind Set of Safety and Responsibility  [x] The American Academy of Pediatrics reports that adolescence is a highly vulnerable stage in life for teenagers struggling to develop traits of identity, independence and autonomy. Children, of course, are both naturally curious and innocently unaware of many dangers around them. Thus, adolescents as well as children may not be sufficiently safeguarded by cautionary words, however frequent. Contrary actions can completely undermine good advice. A "Do as I say and not as I do" approach to gun safety is both irresponsible and dangerous.  [x] Remember that actions speak louder than words. Children learn most by observing the adults around them. By practicing safe conduct you will also be teaching safe conduct.    Safety and Storage Devices  [x] If you decide to keep a firearm in your home you " must consider the issue of how to store the firearm in a safe and secure manner. There are a variety of safety and storage devices currently available to the public in a wide range of prices. Some devices are locking mechanisms designed to keep the firearm from being loaded or fired, but don't prevent the firearm from being handled or stolen. There are also locking storage containers that hold the firearm out of sight. For maximum safety you should use both a firearm safety device and a locking storage container to store your unloaded firearm.   Two of the most common locking mechanisms are trigger locks and cable locks. Trigger locks are typically two-piece devices that fit around the trigger and trigger guard to prevent access to the trigger. One side has a post that fits into a hole in the other side. They are locked by a key or combination locking mechanism. Cable locks typically work by looping a strong steel cable through the action of the firearm to block the firearm's operation and prevent accidental firing. However, neither trigger locks nor cable locks are designed to prevent access to the firearm.   [x] Smaller lock boxes and larger gun safes are two of the most common types of locking storage containers. One advantage of lock boxes and gun safes is that they are designed to completely prevent unintended handling and removal of a firearm. Lock boxes are generally constructed of sturdy, high-grade metal opened by either a key or combination lock. Gun safes are quite heavy, usually weighing at least 50 pounds. While gun safes are typically the most expensive firearm storage devices, they are generally more reliable and secure.     Remember: Safety and storage devices are only as secure as the precautions you take to protect the key or combination to the lock.    RULES FOR KIDS  Adults should be aware that a child could discover a gun when a parent or another adult is not present. This could happen in the  child's own home; the home of a neighbor, friend or relative; or in a public place such as a school or park. If this should happen, a child should know the following rules and be taught to practice them.   Stop  The first rule for a child to follow if he/she finds or sees a gun is to stop what he/she is doing.  Don't Touch!  The second rule is for a child not to touch a gun he/she finds or sees. A child may think the best thing to do if he/she finds a gun is to pick it up and take it to an adult. A child needs to know he/she should NEVER touch a gun he/she may find or see.  Leave the Area  The third rule is to immediately leave the area. This would include never taking a gun away from another child or trying to stop someone from using gun.  Tell an Adult  The last rule is for a child to tell an adult about the gun he/she has seen. This includes times when other kids are playing with or shooting a gun.     METHODS OF CHILDPROOFING YOUR FIREARM  As a responsible handgun owner, you must recognize the need and be aware of the methods of childproofing your handgun, whether or not you have children.  Whenever children could be around, whether your own, or a friend's, relative's or neighbor's, additional safety steps should be taken when storing firearms and ammunition in your home.  [x] Always store your firearm unloaded.  [x] Use a firearms safety device AND store the firearm in a locked container.  [x] Store the ammunition separately in a locked container.  Always storing your firearm securely is the best method of childproofing your firearm; however, your choice of a storage place can add another element of safety. Carefully choose the storage place in your home especially if children may be around.  [x] Do not store your firearm where it is visible.  [x] Do not store your firearm in a bedside table, under your mattress or pillow, or on a closet shelf.  [x] Do not store your firearm among your valuables (such as  jewelry or cameras) unless it is locked in a secure container.  [x] Consider storing firearms not possessed for self-defense in a safe and secure manner away from the home.    EverywThinktwice for Gun Safety:  https://www.everytown.org       Gun Violence: Prediction, Prevention and Policy  American Psychological Association Panel of Experts Report  https://www.apa.org/pubs/reports/gun-violence-report.pdf     If you require further information pertaining to any of the issues outlined above, please reach out to your providers through the [facility] portal at [email or web address], or call [phone] to discuss.  See resource list for additional material.      IN CASE OF SUICIDAL THINKING, call the [facility] Suicide Hotline Number: 988    988 Suicide & Crisis Lifeline: 988 , 9-228-791-TALK (8255)  Provides 24/7, free and confidential support for people in distress, prevention and crisis resources for you or your loved ones, and best practices for professionals.    Call, text or chat.  https://Optimalize.me              REFERRAL RECOMMENDATIONS FOR SUBSTANCE ABUSE & MENTAL HEALTH      IN CASE OF SUICIDAL THINKING, call the [employer] Suicide Hotline Number: 988    988 Suicide & Crisis Lifeline: 988 , 3-234-145-DKFP (8255)  https://Optimalize.me       SUBSTANCE ABUSE:     [facility] (formerly known as [facility])  [x] [phone], Option 2  [x] [address], [place] [address]  [x] [email or web address]  [x] [facility] delivers comprehensive and collaborative treatment for alcohol and substance use disorders.  Excellent program for working professionals or anyone else seeking recovery.  [x] Requires insurance approval prior to starting program, call number above for more information.  [x] Intensive Outpatient Rehabilitation Program - M-F 9am-3pm - daily groups with psychologists and social workers, sessions with MDs 3x per week   [x]  Ambulatory detox and dual diagnosis available      SUBOXONE:     NOTE: some Suboxone clinics require their clients to participate in a structured program (such as an IOP) in order to be prescribed Suboxone.  Some clinics have a long waiting list.  Most of these clinics do not accept walk-in clients, so call first to to learn what must be done to get started on Suboxone.    [facility] - [phone] (can do Sublocade)  [address], [place] [address]    [facility]  [address], [place]  [phone]    [facility] - [phone] (can do Sublocade)  [address] [place] [address]    [facility]  [address] [place]  [phone]     [facility] (very short waiting list, may accept some walk-in's but call first if possible)  [address], [place] [id number] [phone]; [phone]    [facility]   [address] [place]    [phone]    [facility] (can usually be seen within a week but is cash only for appointment)  [address] [place], [facility]  [address] [place]  [phone]    [facility] ([facility]  [address] [place] [id number] [phone]    [place], [place]:    [facility] - [address] [place] [address] - Tel: [phone]    Gerardo Henderson - [address] [place] [address] - Tel: [phone]    [facility] Bertrand - [address] [place] [address] - Tel: [phone]    Maverick Disla - [address] [place] [address] - Tel: [phone]    Ming Esqueda - [address] [place] [address] - Tel: [phone]    [place], [place]:     Dr. Jose Emmanuel and Dr. Bridger Bella - [address] [place] - Tel: [phone]    Dr. Traci Celeste - [address] [place] - Tel: [phone]    Dr. Kareem Jacinto - Tel: [phone]    Dr. Roland Stringer - [facility] - [phone]      METHADONE:     [facility] (the only methadone clinic in the [facility], has two locations)  [x] [place] - [address] [place] [address], [phone]  [x] [facility] [place] [address], [phone]      12 STEP PROGRAMS (and similar):     Alcoholics Anonymous (local)  [x] [phone]  [x] www.[place].org for schedules for in-person and online meetings  [x] There are AA meetings throughout the day all over town  [x] AA costs nothing to attend; they pass a basket for donations but this is not required    Narcotics Anonymous  [x] [phone]  [x] www.noana.org  [x] There are NA meetings throughout the day all over [facility]  [x] NA costs nothing to attend; they pass a basket for donations but this is not required    Alcoholics Anonymous Online Intergroup (national)  [x] www.aa-intergroup.org  [x] Good resource for large, nation-wide meetings  [x] Can also attend smaller, local meetings in other cities  [x] Countless meetings all day and all night  [x] AA costs nothing to attend; they pass a basket for donations but this is not required    [employer] - 24/7 zoom meetings for women and coed - sign on anytime, anywhere!  [email or web address]-[email or web address]    Online Intergroup of AA - [address] Open AA [place] Meeting - 24/7 zoom meetings  https://aa-intergroup.org/meetings/    LOOKING FOR AN ALTERNATIVE TO 12 STEP PROGRAMS - check out:  [facility]: https://www.smartrecovery.org/about-us  [facility]: https://[email or web address]      DETOX UNITS (USUALLY 5-7 DAYS):     [facility]: [address], [place]  [phone], call first to ensure bed availability    [facility]: [address] [place]  [phone], Option 1, call first to ensure bed availability    [place] [facility]: [address], [place]  [phone] (intake by appointment only)    [facility]: [address] [place]  [phone]      INTENSIVE OUTPATIENT PROGRAMS:     [facility]  (formerly known as the [facility])  [x] [phone], Option 2  [x] [address], [facility] [address]  [x] [email or web address]  [x] [facility] delivers comprehensive and collaborative treatment for alcohol and substance use disorders.  Excellent program for working professionals or anyone else seeking recovery.  [x] Requires insurance approval prior to starting program, call number above for more information.  [x] Intensive Outpatient Rehabilitation Program - M-F 9am-3pm - daily groups with psychologists and social workers, sessions with MDs 3x per week   [x] Ambulatory detox and dual diagnosis available    [facility] Intensive Outpatient Program  [x] [phone]  [x] [facility] (the clinic not on [facility])  [x] Call number above for more info and to check insurance requirements    [facility]  [address] [place] [address] [phone]    [facility]:  [address] [place] [address], [address]?, [place], [place] [address]?, [phone]  [address] [facility] [address] [place], [place] [address]?, [phone]    RESIDENTIAL REHABS (USUALLY 28 DAYS):     [facility]: [address], [phone]    [facility]: [facility] [place] , [facility]  [phone] (intake by appointment only)    [facility] (men only) [address] [facility], [phone]    [facility] (Female only) [address] [facility], [phone]    [facility]: [address] [facility], men's program [phone], women's program [phone]    [facility]: [address], [facility], [phone]    [facility]: [address], [place], [place], [phone]    [facility]: Men only, [phone], [address] [place], [facility]: [address], [place], [place], [phone]    [facility]: [address] [place] [address],  [phone]  New Location: [facility]  [facility] [address], [place] [address], [phone]    [facility]:   ?[address]?[place], [place] [address]?[phone]    [place]ara: [address], [place] [address], [phone]    [place]: [place], [place], [phone]     [facility]: [place], [place], [phone]    [facility] [address]: [place], [place], [phone]    [facility]: [place], [place], [phone]    [place]: [place], [phone]    [facility]: [address] [facility], [place] [address], [phone]    COMMUNITY ADDICTION CLINICS:     ACER: [address] [facility], [facility] [place], -[phone] -or- [address] [place] [address]    [facility] [place]: [address] [facility] [place]  [address]     [facility]: Clinics [phone]; Crisis [phone]    [facility]: [address] [place] [address]    [facility]: [address] [place] [address]    [facility]: [address] [place] [address],    [facility]: 2nd Floor [address] [place] [address]    [facility]: [address] Laura Morgan, [place] [address]    [facility], [address], [place] [address]    [facility]: [address] [place] [address] [phone]  (serves youth 16-23 years old)    [facility]: [facility]/ [place] [phone]    [facility]: [address] [facility], [facility] [phone]    [facility]: [address], [facility] [phone]    [facility]: [address] [facility], [address], [phone]     [facility]: [address] [facility] Isabel Cobos, [phone], [phone]    RESOURCES IN OTHER [facility]:     [facility]: [facility] F.  "[facility] Ed[place]ira Villanueva, [phone], [phone]    [facility]: [address] [facility], [address], [phone]    [facility], [address] [place] [address], [phone]    [facility]: [address] [facility], [phone]    [facility], [address] [facility], [phone] ([facility])    [facility], [address] [facility], [phone] ([facility])    [facility], [address], [address], [place], [phone] ([place], [place], and [facility])    [facility], [address] [facility], [phone] ([facility])    [facility] Hotline [phone], [phone]    [facility], [address] [facility], [facility], [address] [facility], [address], [facility] Health [place], 18[address] [facility], [facility] Behavioral [facility], 500 [facility]. [address]., [facility], [address], [facility], [address], [address] [phone]    [facility] system, 302 [facility] [phone]    [facility], [address] [facility], [phone]    [facility], 85 [facility], [phone]      Andorran SPEAKING (en español):     Información de la reunión de Alcohólicos Anónimos  Rocael [facility], 10:00 am  Habla español  Esta reunión está abierta y cualquiera puede asistir.    English speaking Alcoholics anonymous meetings:  El "Rocael [place] AA Skype" es un rocael on line de Alcohólicos Anónimos en español. El rocael es jere, gratuito y virtual a través de Skype Audio. El rocael funciona " mediante evelyn llamada grupal de voz, por lo que no se utiliza la videollamada, ni se pueden francisca las imágenes o rostros de los participantes. Hace deidra años y medio abrimos el primer Rocael de AA por Vlad en Isai[place], gerald actualmente asisten personas desde [place], [place], [place], [place], [place], [place], [place], [place], [place], [place], [place] y [place], entre otros.    El rocael es muy útil para los alcohólicos que residen en lugares donde no se celebran reuniones de AA, o residen en lugares donde las reuniones de AA son un número limitado de días a la semana, o para aquellos compañeros que se hayan de viaje o que, por cualquier motivo, se hayan convalecientes y no pueden desplazarse. Todos los días nos reuniones a las 21:00 (hora española)    Podéis obtener más información sobre el rocael y hawa sesiones en la página web [email or web address]      MENTAL HEALTH:     [facility] - Outpatient Clinic  [phone]    [facility] - General Psychiatry Intensive Outpatient Program  [facility] (formerly known as the BMU)  [phone], option 3    [facility] - Dual Diagnosis Intensive Outpatient Program  [facility] (formerly known as the ABU)  [phone], option 2      COMMUNITY MENTAL HEALTH CENTERS:     [facility]  (aka [facility])  Serves [employer], and [facility] residents.  Serves uninsured patients & those with Medicaid.  Main location: [address] [place] [address]  [phone]  Walk-in's available during regular business hours.  24/7 Crisis Line: [phone]    [facility]  (aka [facility], [facility])  Serves [facility].  Serves uninsured patients, those with Medicaid and some private plans.  Walk-in's available during regular business  hours.  Primary care services available as well.  [facility]: [address] [facility] [address] [place] [address];  [phone]  [place]: [address] [place] [address];  [phone]  24/7 Crisis Line: [phone]    [facility]  Serves uninsured patients & those with Medicaid, call for more info.  Primary care, pediatrics, HIV treatment, and dentistry services available as well.  Three locations.  [phone]    [facility] [place]?Corporate Office  Serves patients with Medicaid, Medicare, and private insurance  [address]  [place] [address]  [phone]    [facility]  Serves uninsured on a sliding scale, as well as Medicaid, Medicare, and private plans.  Eight locations around the [facility] area.  [phone]    [facility]  Serves uninsured patients & those with Medicaid, private insurances.  Primary care available as well.  [phone]  [address] [place] [address]    [facility] Outpatient Psychiatry  Serves veterans who were honorably discharged.  [address] [place] [address]  [phone]  24/7 Veterans Crisis Line: [phone] (Press 1)    If you have private insurance and need to find a specialist, please contact your insurance network to request a list of providers covered by your benefits.      MENTAL HEALTH/ADDICTIVE DISORDERS:     AA ([phone]), NA ([phone])   National Suicide Prevention Lifeline- Call 1-710.323.1578 Available 24 hours everyday  [facility] [phone]; Crisis Line [phone] - Call for options A-F:  Intensive Outpatient Treatment/ Day programs   [facility], please contact   [facility], please contact [phone] or [phone] to speak with an admissions counselor.  [facility] (Methadone Maintenance)   [address] [place] [address], [phone]  [address],  [place] [address] [phone]  [facility], [address] [place] [address], [phone]  [place] [facility] [phone]  [facility] please contact [phone]  [facility], [address], 4th [place] [address] Phone: [phone]   Acer  [place] Office: [address] [address], [phone]  [place] Office: [address] [facility], [address], [place] [address], [phone]  [place] Office: [address] [place] [address] [phone]    Outpatient Substance Abuse Treatment   [facility] (Methadone Maintenance)   [address] [place] [address], [phone]  [facility] [place] [address] [phone]  [facility], [address] [place] [address], [phone]  Acer  [place] Office: [address] [address], [phone]  [place] Office: [facility], [address], [place] [address], [phone]  [place] Office: [address] [place] [address] [phone]  [place] [facility], [address] [place] [address] [phone]   [facility], [address] [facility], [address], [phone]  [facility], [address] [place], [place] [phone]    Residential Substance Abuse Treatment   [facility] [address] [facility], [phone] or [phone]  [facility], [address] [facility], [phone]  [facility] (men only) [address] [place] [address], [phone]  Women at the [facility] (women only) [address] [place] [address] [phone]  [facility], [address] [place] [address] [phone]  [facility] (women only), intakes at [address] [facility], [phone]  [facility] (7-day program, $100, [address] Leila Rizzo, [phone], [phone],  [phone])  [facility] (Men only, [phone]), [address], [place] (Vets*/Non-Vets)  [facility] (Men only, $400/month program fee) [address], [phone]  [facility] (Women over age 39 only), [address] [facility], [phone]    Out of Area:    [place], [address] [facility] [address], [place], [place] [phone])  [facility] Program (men only), [address] [facility] [place] [address], [phone]  [facility], [address] [place], [place] ([phone])  [place], [facility] [place], [place] ([phone])  [facility], [address] [facility], [phone]  [facility] (Women only, has to have mental illness, can be homeless or substance abuser), [phone]        DOMESTIC VIOLENCE RESOURCES:     Advocacy  [facility] (NOF[facility])  [address] [address]    [facility] ? [phone]  Services provided: emergency shelter, individual advocacy, information and referrals, group support, children's program, medical advocacy, forensic medical exams, primary care, legal assistance, counseling, safety planning, and caregiver support    [facility] HEALING AND EMPOWERMENT [place]  Confidential location  [facility] ? [phone]  Services provided: short term emergency shelter, all services provided are free of charge    [facility] CENTERS FOR COMMUNITY ADVOCACY  Multiple locations in [facility], [place], [facility], [place], and [facility] ([place], [place], and [place])    [facility] ? [phone]  Services provided: emergency shelter, individual advocacy, information and referrals, group support, children's program, medical advocacy, legal assistance in obtaining restraining orders, counseling, safety planning, and caregiver support    [facility]   Emergency Shelter   [phone]  Emergency Services ,Legal and Financial Assistance Services ,Housing  Services ,Support Services     [place] [facility]   [phone]  Emergency Services ,Counseling Services , Housing Services ,Support Services ,Children's Services     WOMEN WITH A VISION  [address] [place] [address]  WWAV ? [phone]  Services provided: advocacy, health education and supportive services, specializing in free healing services for marginalized groups, including LGBTQ individuals and sex workers    SEXUAL TRAUMA AWARENESS AND RESPONSE (STAR)  [address] [place] [address]    STAR ? [phone]) 867-[phone]  Services provided: individual advocacy, information and referrals, group support, medical advocacy, legal assistance, counseling, and safety planning for survivors of sexual assault    [facility] ([facility])  [address] [place] [address]  [facility] Forensic Program ? [phone]  Services provided: free forensic medical exams for sexual assault and domestic violence, which can be performed up to 5 days after an incident. It is not necessary to make a police report to receive a forensic medical exam    Legal  [facility]  [address] [address]  Project SAVE ? [phone]  Services provided: free emergency legal representation for survivors of doemstic violence residing in [facility]. Legal services may include temporary restraining orders, temporary child support, custody, and use of property    [facility] LEGAL SERVICES (SLLS)  [address] [facility] [address], [place] [address]  SLLS ? [phone]  Services provided: free legal representation for survivors of domestic violence residing in [facility]. Legal services may include temporary child support, custody, and divorce      HOTLINES:     [facility] DOMESTIC VIOLENCE HOTLINE  [phone]    Services provided: free and confidential hotline for victims and survivors of domestic violence. All calls will be routed to a domestic violence  service provided in the victim or survivor's area    NATIONAL HUMAN TRAFFICKING HOTLINE  [phone]    Services provided: national anti-trafficking hotline serving victims and survivors of human trafficking. Provides information about local resources, and access to safe space to report tips, seek services, and ask for help    VIA LINK  211 or [phone]    Service provided: counselors can provide crisis counseling. Counselors can also provide information and referrals to programs which can help with needs such as food, shelter, medical care, financial assistance, mental health services, substance abuse treatment, senior services, childcare, and more      HOMELESS SHELTERS:      Homeless shelters  [facility]  Emergency shelter for individuals and families  [address] [facility]  [phone]  [facility]  Emergency shelter for men only  Meals daily 6am, 2pm, & 6pm  Clothing, case management M-F by appointment (ID/job/housing/legal assistance), mail  [address] [facility]  [phone]  [facility]  Emergency shelter for men  [address] [facility]  [phone]  Emergency shelter for women  [address] [facility]  [phone]  Breakfast & lunch daily, dinner M-F  Case management, job counseling services   [facility]  Emergency shelter for teens and young adults up to 22yo  [address] [facility]  [phone]  [facility]  Emergency shelter for women over 19yo and their kids  [address] [place] [address]  [phone]  [facility]  Day program, meals M-F 1PM (arrive early)  Showers, laundry, hygiene kits, showers, phones, , notary services, case management, ID assistance  [address] [facility]  [phone] M-F 8am-2:30pm  [facility]  Day program  MTWF 7:30am-3:30pm,  8:30am-3:30pm  Crisis intervention, employment assistance, food/clothing, hygiene kits, bus tokens, mail  [address] [facility] [address]  [phone]  [facility]  Mobile outreach  for homeless persons in [facility]  [phone]  Healthcare for the Homeless  Primary healthcare, case management, dental services, TB placement  Call ahead  [address]  2nd Floor  [phone]  [place]  Connects homeless people with their loved ones in other cities by providing transportation costs   [phone]      [place] RESOURCES:     [place] [facility] Crisis Response Team:    Region 12 ([place], [place], [place], and [facility]) ([facility])  [phone]      Outpatient Mental Health & Addiction Clinic Resources for both [facility]:    [facility] Resources  Website: www.[facility].org  Main Number: 428-909-9063    [facility] ([facility])  [address] ([address] [place] [address]) [place] [address]  [phone]    [facility] ([facility])  [address] ([address] [facility]) [place] [address]  [phone]    [facility]  [address] ([address]) [place] [address]  [phone]    [facility]  [address] ([address] [facility]) [place] [address]  [phone]      Addiction Treatment Resources for both [facility]:    [facility] (Detox, Residential, PHP, IOP, and Aftercare Programs)  [address] [place] [address]  [phone]    [facility] (Residential, IOP, Transitional Living, and Aftercare Programs)  [address] [place] [address]  [phone]    [facility] (Inpatient, Residential, Detox, IOP, Outpatient, and Aftercare Programs)  [address] [place] [address], [place] [address]  [phone] or toll free at [phone]      Outpatient Mental Health Psychotherapy Resources for both [facility]:    Pinky Hendricks,  [facility]  [address]  [place] [address]  [phone]  Specialties: Depression, Anxiety, and Life Transitions    Britta Jovel, PhD  [address]  [place] [address]  [phone]  Specialties: Testing and Evaluation, Education and Learning Disabilities, and ADHD    Verna Leroy, [facility] [address] [place] [address]  [phone]  Specialties: Obsessive-Compulsive (OCD), Depression, and Relationship Issues    Sidra Lal [facility] [address] [place] [address]  [phone]  Specialties: Trauma & PTSD, Mood Disorders, and Anxiety    Sidra Gallagher, PhD, [facility] Counseling [address] [place] [address]  [phone]  Specialties: Family Conflict, Child, and Relationship Issues    Nedra Bhandari [facility] Counseling [place] [address] [phone]  Specialties: Anxiety, Depression, and Anger Management        IN CASE OF SUICIDAL THINKING, call the National Suicide Hotline Number: 988    988 Suicide & Crisis Lifeline: 988 , 8-174-404-TALK (3818)  Provides 24/7, free and confidential support for people in distress, prevention and crisis resources for you or your loved ones, and best practices for professionals.    Call, text or chat.  https://988Nexgate.org

## 2023-06-18 NOTE — PROGRESS NOTES
"[facility] Medicine  Progress Note     Patient Name: Shania Tapia  MRN: [id number]  Patient Class: IP- Inpatient     Admission Date: 6/13/2023  Length of Stay: 4 days  Attending Physician: Tyshawn Solis MD  Primary Care Provider: Flavia Fink DO           Subjective:      Principal Problem:Metabolic acidosis     HPI:  Shania Tapia is a 37 y.o. female with Anxiety, and Hypertension who presented to ED on 6/13/2023 with complaint of blurry vision, color vision changes, difficulty with word findings and unsteady gait. Additionally have been feeling jittery for a couple days prior which she initially attributed to anxiety. Work up in ED included CT head and MRI brain which showed no acute findings. Labs showed severe high anion gap metabolic acidosis with CO2 < 5, ABG 7.065/9.8/108/2.8 on room air; D-dimer 9.38. CTA chest shows dense RUL pneumonia     Started on empiric antibiotics, IV bicarbonate push, with no improvement noted in repeat ABG. Patient then was noted to have an episode of hand jerkings suspicious for seizure like activity, given a dose of Ativan then went unresponsive. Subsequently was emergently intubated for airway protection. Admitted to ICU for further management     ICU Course:  Patient with HAGMA out of proportion to lactate and ketones.  Concern for toxic alcohol, possibly methanol given vision changes.  Started on fomepizole, bicarbonate infusion.  Renal consulted and initiated emergency HD on admission.   Also started on cefepime, azithromycin, and vanc for empiric treatment of RUL pneumonia     Bicarbonate drip discontinued after acidosis resolved. Successfully extubated on 6/15, transitioned to NC. Patient denies ingestion of any known toxins, or self harm ideation. Vision returned to baseline. However she notes seeing "flowers" and "spiders" at times. States that she knows they're not real. Psych consult pending     Stable for transfer out of " ICU, hospital medicine consulted for continued care.     Patient seen and examined with  and mother present.  She is unable to recall why she came to the hospital.  However she denies any complaints, oriented x3.  Reports that blurriness in her vision is continuing to improve as the day progress.  Family believes that appears to be back to her usual baseline.  Per patient she has been under lot of stress with her job recently thinks that when she sees what's not there is because she spends a lot of time staring at the screen in the course of her job as a         Overview/Hospital Course:  Evaluated by psych, at this time patient is does not meet criteria for PEC, however psych was not able to obtain collateral from .  They recommend PEC if  has any concern regarding suicide ideation/attempt.  They also recommend offering escitalopram 10 mg daily to help target anxiety.        adamantly denies any concern of deliberate attempts at self-harm although admits that she has been under a great deal of stress over the past couple of months in relation to her job.  States that at times her hands even shake due to the stress.  States that patient is not prescribed any medication for anxiety, although she plans to speak with her PCP on her next appointment.  However, she has been trying to treat her anxiety with over-the-counter supplements including Ashwaganda.  Additionally she has taken some anxiety meds which are prescribed to her mother and  to help control her anxiety.  However all these meds have been given freely, patient does not display any seeking behavior.  Per , patient is very cautious about taking any medication, subsequently he does not believe she would take something random without researching it thoroughly.     PT/OT recommend rehab placement at this time  SLP recommend Soft & Bite Sized Diet - IDDSI Level 6, Thin liquids - IDDSI Level 0    "  Electrolyte derangements being repleted        Interval History:  Insomnia overnight. Hallucinations persisting. Delirium. Discussed results with  this morning. In the afternoon, gave printout of Methanol poisoning to patient's Mom that describes the long term effects of methanol poisoning and potential sources of the Methanol.     Objective  BP (!) 179/100   Pulse (!) 112   Temp 99.7 °F (37.6 °C) (Oral)   Resp 17   Ht 5' 1" (1.549 m)   Wt 68.4 kg (150 lb 12.7 oz)   LMP  (LMP Unknown) Comment: last period less than 30 days ago per   SpO2 99%   Breastfeeding No   BMI 28.49 kg/m²     Intake/Output Summary (Last 24 hours) at 6/18/2023 1311  Last data filed at 6/18/2023 1100  Gross per 24 hour   Intake 2038.76 ml   Output 1975 ml   Net 63.76 ml       PHYSICAL EXAM  Constitutional:       General: She is not in acute distress.     Appearance: She is not ill-appearing.   HENT:      Head: Normocephalic and atraumatic.      Right Ear: External ear normal.      Left Ear: External ear normal.      Nose: Nose normal.      Mouth/Throat:      Mouth: Mucous membranes are moist.   Eyes:      Extraocular Movements: Extraocular movements intact.      Pupils: Pupils are equal, round, and reactive to light.   Cardiovascular:      Rate and Rhythm: Regular rhythm. Tachycardia present.   Pulmonary:      Effort: Pulmonary effort is normal. No accessory muscle usage or respiratory distress.      Breath sounds: Examination of the right-upper field reveals rhonchi. Rhonchi present. No wheezing.      Comments: On room air  Abdominal:      General: Bowel sounds are normal. There is no distension.      Palpations: Abdomen is soft.      Tenderness: There is no abdominal tenderness. There is no guarding or rebound.   Genitourinary:     Comments: Little catheter in place  Musculoskeletal:      Cervical back: Neck supple.      Right lower leg: No edema.      Left lower leg: No edema.   Skin:     General: Skin is warm and dry. "   Neurological:      Mental Status: She is alert and oriented to person, place, and time.      Cranial Nerves: No cranial nerve deficit.      Motor: No weakness.   Psychiatric:         Attention and Perception: She is inattentive. She perceives visual hallucinations.         Speech: Speech is not rapid and pressured.         Behavior: Behavior is cooperative.         Thought Content: Thought content is not paranoid.       BMP:   Recent Labs   Lab 06/18/23  0506   GLU 96      K 3.1*      CO2 25   BUN 13   CREATININE 0.8   CALCIUM 8.8   MG 2.1     CBC:   Recent Labs   Lab 06/17/23  0416 06/18/23  0506   WBC 7.93 6.54   HGB 8.7* 8.6*   HCT 28.7* 28.9*   * 209     CMP:   Recent Labs   Lab 06/17/23  0416 06/17/23  1515 06/18/23  0506   * 145 145   K 2.7* 3.0* 3.1*    104 106   CO2 32* 30* 25   GLU 98 120* 96   BUN 7 9 13   CREATININE 0.8 0.8 0.8   CALCIUM 8.6* 8.8 8.8   PROT 6.4  --  6.6   ALBUMIN 2.5*  --  2.7*   BILITOT 0.5  --  0.4   ALKPHOS 121  --  115   AST 58*  --  36   ALT 27  --  23   ANIONGAP 11 11 14     Cardiac Markers: No results for input(s): CKMB, MYOGLOBIN, BNP, TROPISTAT in the last 48 hours.  Coagulation: No results for input(s): PT, INR, APTT in the last 48 hours.  Lactic Acid: No results for input(s): LACTATE in the last 48 hours.  Magnesium:   Recent Labs   Lab 06/17/23  0416 06/18/23  0506   MG 2.0 2.1     Troponin: No results for input(s): TROPONINI, TROPONINIHS in the last 48 hours.  TSH:   Recent Labs   Lab 06/13/23  1415   TSH 1.259     Urine Studies:   No results for input(s): COLORU, APPEARANCEUA, PHUR, SPECGRAV, PROTEINUA, GLUCUA, KETONESU, BILIRUBINUA, OCCULTUA, NITRITE, UROBILINOGEN, LEUKOCYTESUR, RBCUA, WBCUA, BACTERIA, SQUAMEPITHEL, HYALINECASTS in the last 48 hours.    Invalid input(s): WRIGHTSUR    Imaging Results              X-Ray Chest AP Portable (Final result)  Result time 06/14/23 08:02:02      Final result by Logan Benedict MD (06/14/23 08:02:02)                    Impression:      No acute finding in the chest.    Findings discussed with Dr. Woody by telephone on June 14, 2023 at 07:59 within 5 minutes of making the finding.      Electronically signed by: Logan Benedict  Date:    06/14/2023  Time:    08:02               Narrative:    EXAMINATION:  XR CHEST AP PORTABLE    CLINICAL HISTORY:  Hypoxia.  Status post intubation.    FINDINGS:  Comparison is made to CTA chest June 13, 2023.  Single frontal view of the chest.  No comparison.  Right mainstem bronchus intubation.  Consolidation in the right upper lobe.  Left lung is clear.  No pneumothorax or significant pleural effusion.  Enteric catheter appears appropriately position.                                        CTA Chest Non-Coronary (PE Studies) (Final result)  Result time 06/13/23 23:50:43      Final result by Bao Juan MD (06/13/23 23:50:43)                   Impression:      Large right upper lobe opacity favoring pneumonia.  Follow-up to complete resolution recommended.    No definite pulmonary embolus noting suboptimal opacification and motion.    This report was flagged in Epic as abnormal.    All CT scans at this facility are performed  using dose modulation techniques as appropriate to performed exam including the following:  automated exposure control; adjustment of mA and/or kV according to the patients size (this includes techniques or standardized protocols for targeted exams where dose is matched to indication/reason for exam: i.e. extremities or head);  iterative reconstruction technique.      Electronically signed by: Bao Juan  Date:    06/13/2023  Time:    23:50               Narrative:    EXAMINATION:  CTA CHEST NON CORONARY (PE STUDIES)    CLINICAL HISTORY:  Pulmonary embolism (PE) suspected, high prob;    TECHNIQUE:  Low dose axial images, sagittal and coronal reformations were obtained from the thoracic inlet to the lung bases following the IV administration of 100 mL  of Omnipaque 350.  Contrast timing was optimized to evaluate the pulmonary arteries.  MIP images were performed.    COMPARISON:  None    FINDINGS:  Base of Neck: No significant abnormality.    Thoracic soft tissues: Unremarkable.    Aorta: Left-sided aortic arch.  No aneurysm and no significant atherosclerosis    Heart: Normal size. No effusion.    Pulmonary vasculature: Suboptimal opacification related to contrast timing.  Evaluation adequate to the level of the segmental pulmonary arteries.  Motion degrades the exam.    Nichelle/Mediastinum: No pathologic alla enlargement.    Airways: Patent.    Lungs/Pleura: Large right upper lobe opacity favoring pneumonia.  Trace opacities in the middle and lower lobes.  Left lung appears clear allowing for motion artifact.    Esophagus: Unremarkable.    Upper Abdomen: No abnormality of the partially imaged upper abdomen.    Bones: No acute fracture. No suspicious lytic or sclerotic lesions.                                       MRI Brain Demyelinating W W/O Contrast (Final result)  Result time 06/13/23 20:55:45      Final result by Drea Whitfield MD (06/13/23 20:55:45)                   Impression:      No overt acute finding as visualized motion degradation      Electronically signed by: Drea Whitfield  Date:    06/13/2023  Time:    20:55               Narrative:    EXAMINATION:  MRI BRAIN DEMYELINATING W/ WO CONTRAST    CLINICAL HISTORY:  altered mental status, aphasia, reports can't see color;    TECHNIQUE:  Multiplanar multisequence MR imaging of the brain was performed before and after the administration of 6 mL Gadavist intravenous contrast.    COMPARISON:  CT correlation    FINDINGS:  Imaging degraded by motion.  No acute ischemia identified.    No mass effect or midline shift.    Ventricles nondistended.    No overt abnormal parenchymal signal as visualized with motion degradation.    No atypical enhancement                                       CT Head Without  Contrast (Final result)  Result time 06/13/23 15:09:25      Final result by STONE Castro Sr., MD (06/13/23 15:09:25)                   Impression:      Normal study.    All CT scans at this facility use dose modulation, iterative reconstruction, and/or weight base dosing when appropriate to reduce radiation dose when appropriate to reduce radiation dose to as low as reasonably achievable.      Electronically signed by: Carlos Enrique Castro MD  Date:    06/13/2023  Time:    15:09               Narrative:    EXAMINATION:  CT HEAD WITHOUT CONTRAST    CLINICAL HISTORY:  Mental status change, unknown cause;    TECHNIQUE:  Standard brain CT protocol without IV contrast was performed.    COMPARISON:  None    FINDINGS:  The ventricles have a normal size, position, and appearance. There is no abnormal intracranial mass or intracranial hemorrhage. There is no skull fracture. The paranasal sinuses are normal in appearance.                                      Assessment/Plan:      * Metabolic acidosis  Initially presented with high anion gap metabolic acidosis and high osmolar gap with CO2 < 5, pH 7.0  Severe and out of proportion to lactate and ketones.  Salicylates negative.  Concern for toxic alcohols.  Particularly methanol given reported vision changes.   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 Discussed the chances of this being the case with family,  they acknowledged understanding.   -Seroquel started on Sunday     Severe sepsis - resolved  This patient does have evidence of infective focus  My overall impression is sepsis.  Source: Respiratory  Will Not start Pressors- Levophed for MAP of 65  Source control achieved by: IV antibiotics  Monitor fever curve     06/18/2023  Cultures no growth to date.  Last day of Antibiotic therapy on 6/22/23  Rest of plan as stated above     Acute respiratory failure with hypoxia  Patient required emergent intubation for airway protection in ED  Successfully extubated on 06/15/2023 and transitioned to nasal cannula  Weaned off supplemental O2  Continue treatment for pneumonia     Pneumonia of right upper lobe due to infectious organism  Large right upper lobe opacity favoring pneumonia  Empirically started on vancomycin, cefepime and azithromycin initially  Endotracheal aspirate sent for culture, we will follow  Transitioned to ceftriaxone and azithromycin  Leukocytosis resolved  Monitor respiratory status    Hypophosphatemia  Replete as indicated   Monitor     Hypomagnesemia  Replete as indicated   Monitor     Hypokalemia  Replete as needed  Monitor  06/17/2023  Low again today, replaced by Nephrology     Anxiety  Patient admits to ongoing increased anxiety over the past several days prior to ED presentation, however  reports that this has been ongoing for at least a couple months she had had patient has attempted to try over-the-counter meds such as Ashwaganda and that she has tried some anxiety meds that have been prescribed to her mother and  with their knowledge  Discussed psych recommendations consideration for starting escitalopram 10 mg with patient.  Patient has not decided yet whether she would like to start escitalopram                 VTE Risk Mitigation (From admission, onward)           Ordered       enoxaparin injection 40 mg  Daily         06/14/23 0122                       Discharge Planning    MANDEEP:      Code Status: Full Code   Is the patient medically ready for discharge?:     Reason for patient still in hospital (select all that apply): Patient trending condition, Treatment and Consult recommendations  Discharge Plan A: Rehab             Tyshawn Solis MD  Department of Hospital Medicine   [facility] [facility])

## 2023-06-19 PROBLEM — R44.3 HALLUCINATIONS: Status: ACTIVE | Noted: 2023-06-15

## 2023-06-19 PROBLEM — R74.8 HIGH SERUM OSMOLAR GAP: Status: RESOLVED | Noted: 2023-06-15 | Resolved: 2023-06-19

## 2023-06-19 PROBLEM — R44.1 VISUAL HALLUCINATION: Status: RESOLVED | Noted: 2023-06-15 | Resolved: 2023-06-19

## 2023-06-19 PROBLEM — R41.0 DELIRIUM: Status: ACTIVE | Noted: 2023-06-19

## 2023-06-19 PROBLEM — T51.1X1A: Status: ACTIVE | Noted: 2023-06-19

## 2023-06-19 LAB
ALBUMIN SERPL BCP-MCNC: 2.8 G/DL (ref 3.5–5.2)
ALP SERPL-CCNC: 91 U/L (ref 55–135)
ALT SERPL W/O P-5'-P-CCNC: 20 U/L (ref 10–44)
AMMONIA PLAS-SCNC: 43 UMOL/L (ref 10–50)
ANION GAP SERPL CALC-SCNC: 12 MMOL/L (ref 8–16)
ANISOCYTOSIS BLD QL SMEAR: SLIGHT
AST SERPL-CCNC: 32 U/L (ref 10–40)
BACTERIA BLD CULT: NORMAL
BACTERIA BLD CULT: NORMAL
BASOPHILS # BLD AUTO: 0.02 K/UL (ref 0–0.2)
BASOPHILS NFR BLD: 0.4 % (ref 0–1.9)
BILIRUB DIRECT SERPL-MCNC: 0.2 MG/DL (ref 0.1–0.3)
BILIRUB SERPL-MCNC: 0.5 MG/DL (ref 0.1–1)
BUN SERPL-MCNC: 10 MG/DL (ref 6–20)
CALCIUM SERPL-MCNC: 8.5 MG/DL (ref 8.7–10.5)
CHLORIDE SERPL-SCNC: 105 MMOL/L (ref 95–110)
CO2 SERPL-SCNC: 28 MMOL/L (ref 23–29)
CREAT SERPL-MCNC: 0.8 MG/DL (ref 0.5–1.4)
DACRYOCYTES BLD QL SMEAR: ABNORMAL
DIFFERENTIAL METHOD: ABNORMAL
EOSINOPHIL # BLD AUTO: 0.1 K/UL (ref 0–0.5)
EOSINOPHIL NFR BLD: 1.3 % (ref 0–8)
ERYTHROCYTE [DISTWIDTH] IN BLOOD BY AUTOMATED COUNT: 20.2 % (ref 11.5–14.5)
EST. GFR  (NO RACE VARIABLE): >60 ML/MIN/1.73 M^2
GIANT PLATELETS BLD QL SMEAR: PRESENT
GLUCOSE SERPL-MCNC: 96 MG/DL (ref 70–110)
HCT VFR BLD AUTO: 25.6 % (ref 37–48.5)
HGB BLD-MCNC: 7.8 G/DL (ref 12–16)
IMM GRANULOCYTES # BLD AUTO: 0.1 K/UL (ref 0–0.04)
IMM GRANULOCYTES NFR BLD AUTO: 1.9 % (ref 0–0.5)
LYMPHOCYTES # BLD AUTO: 1.4 K/UL (ref 1–4.8)
LYMPHOCYTES NFR BLD: 27.7 % (ref 18–48)
MAGNESIUM SERPL-MCNC: 2 MG/DL (ref 1.6–2.6)
MCH RBC QN AUTO: 21.3 PG (ref 27–31)
MCHC RBC AUTO-ENTMCNC: 30.5 G/DL (ref 32–36)
MCV RBC AUTO: 70 FL (ref 82–98)
MONOCYTES # BLD AUTO: 0.9 K/UL (ref 0.3–1)
MONOCYTES NFR BLD: 17.7 % (ref 4–15)
NEUTROPHILS # BLD AUTO: 2.7 K/UL (ref 1.8–7.7)
NEUTROPHILS NFR BLD: 51 % (ref 38–73)
NRBC BLD-RTO: 0 /100 WBC
OSMOLALITY SERPL: 300 MOSM/KG (ref 275–295)
OVALOCYTES BLD QL SMEAR: ABNORMAL
PHOSPHATE SERPL-MCNC: 3.4 MG/DL (ref 2.7–4.5)
PLATELET # BLD AUTO: 284 K/UL (ref 150–450)
PLATELET BLD QL SMEAR: ABNORMAL
PMV BLD AUTO: 10.8 FL (ref 9.2–12.9)
POTASSIUM SERPL-SCNC: 2.6 MMOL/L (ref 3.5–5.1)
PROCALCITONIN SERPL IA-MCNC: 0.36 NG/ML
PROT SERPL-MCNC: 6.3 G/DL (ref 6–8.4)
RBC # BLD AUTO: 3.67 M/UL (ref 4–5.4)
RPR SER QL: NORMAL
SODIUM SERPL-SCNC: 145 MMOL/L (ref 136–145)
SPHEROCYTES BLD QL SMEAR: ABNORMAL
WBC # BLD AUTO: 5.2 K/UL (ref 3.9–12.7)

## 2023-06-19 PROCEDURE — 97530 THERAPEUTIC ACTIVITIES: CPT

## 2023-06-19 PROCEDURE — 80076 HEPATIC FUNCTION PANEL: CPT | Performed by: NURSE PRACTITIONER

## 2023-06-19 PROCEDURE — 93005 ELECTROCARDIOGRAM TRACING: CPT

## 2023-06-19 PROCEDURE — 25000003 PHARM REV CODE 250: Performed by: INTERNAL MEDICINE

## 2023-06-19 PROCEDURE — 80048 BASIC METABOLIC PNL TOTAL CA: CPT | Performed by: INTERNAL MEDICINE

## 2023-06-19 PROCEDURE — 92526 ORAL FUNCTION THERAPY: CPT

## 2023-06-19 PROCEDURE — 83735 ASSAY OF MAGNESIUM: CPT | Performed by: NURSE PRACTITIONER

## 2023-06-19 PROCEDURE — 97535 SELF CARE MNGMENT TRAINING: CPT

## 2023-06-19 PROCEDURE — 82746 ASSAY OF FOLIC ACID SERUM: CPT | Performed by: INTERNAL MEDICINE

## 2023-06-19 PROCEDURE — 63600175 PHARM REV CODE 636 W HCPCS: Performed by: NURSE PRACTITIONER

## 2023-06-19 PROCEDURE — 21400001 HC TELEMETRY ROOM

## 2023-06-19 PROCEDURE — 94761 N-INVAS EAR/PLS OXIMETRY MLT: CPT

## 2023-06-19 PROCEDURE — 63600175 PHARM REV CODE 636 W HCPCS: Performed by: STUDENT IN AN ORGANIZED HEALTH CARE EDUCATION/TRAINING PROGRAM

## 2023-06-19 PROCEDURE — 93010 ELECTROCARDIOGRAM REPORT: CPT | Mod: ,,, | Performed by: INTERNAL MEDICINE

## 2023-06-19 PROCEDURE — 84100 ASSAY OF PHOSPHORUS: CPT | Performed by: INTERNAL MEDICINE

## 2023-06-19 PROCEDURE — 86592 SYPHILIS TEST NON-TREP QUAL: CPT | Performed by: INTERNAL MEDICINE

## 2023-06-19 PROCEDURE — 93010 EKG 12-LEAD: ICD-10-PCS | Mod: ,,, | Performed by: INTERNAL MEDICINE

## 2023-06-19 PROCEDURE — 92507 TX SP LANG VOICE COMM INDIV: CPT

## 2023-06-19 PROCEDURE — 63600175 PHARM REV CODE 636 W HCPCS: Performed by: INTERNAL MEDICINE

## 2023-06-19 PROCEDURE — 25000003 PHARM REV CODE 250: Performed by: STUDENT IN AN ORGANIZED HEALTH CARE EDUCATION/TRAINING PROGRAM

## 2023-06-19 PROCEDURE — 82140 ASSAY OF AMMONIA: CPT | Performed by: INTERNAL MEDICINE

## 2023-06-19 PROCEDURE — 84145 PROCALCITONIN (PCT): CPT | Performed by: NURSE PRACTITIONER

## 2023-06-19 PROCEDURE — G0408 INPT/TELE FOLLOW UP 35: HCPCS | Mod: 95,,, | Performed by: PSYCHIATRY & NEUROLOGY

## 2023-06-19 PROCEDURE — G0408 PR TELHEALTH INPT CONSULT 35MIN: ICD-10-PCS | Mod: 95,,, | Performed by: PSYCHIATRY & NEUROLOGY

## 2023-06-19 PROCEDURE — 97530 THERAPEUTIC ACTIVITIES: CPT | Mod: CQ

## 2023-06-19 PROCEDURE — 85025 COMPLETE CBC W/AUTO DIFF WBC: CPT | Performed by: NURSE PRACTITIONER

## 2023-06-19 PROCEDURE — 82607 VITAMIN B-12: CPT | Performed by: INTERNAL MEDICINE

## 2023-06-19 PROCEDURE — 83930 ASSAY OF BLOOD OSMOLALITY: CPT | Performed by: INTERNAL MEDICINE

## 2023-06-19 PROCEDURE — 25000003 PHARM REV CODE 250: Performed by: HOSPITALIST

## 2023-06-19 RX ORDER — POTASSIUM CHLORIDE 7.45 MG/ML
10 INJECTION INTRAVENOUS
Status: COMPLETED | OUTPATIENT
Start: 2023-06-19 | End: 2023-06-19

## 2023-06-19 RX ORDER — NIFEDIPINE 30 MG/1
30 TABLET, EXTENDED RELEASE ORAL DAILY
Status: DISCONTINUED | OUTPATIENT
Start: 2023-06-19 | End: 2023-06-20 | Stop reason: HOSPADM

## 2023-06-19 RX ORDER — POTASSIUM CHLORIDE 20 MEQ/1
40 TABLET, EXTENDED RELEASE ORAL 2 TIMES DAILY
Status: DISCONTINUED | OUTPATIENT
Start: 2023-06-19 | End: 2023-06-20 | Stop reason: HOSPADM

## 2023-06-19 RX ADMIN — POTASSIUM CHLORIDE 10 MEQ: 7.46 INJECTION, SOLUTION INTRAVENOUS at 11:06

## 2023-06-19 RX ADMIN — POTASSIUM CHLORIDE 10 MEQ: 7.46 INJECTION, SOLUTION INTRAVENOUS at 12:06

## 2023-06-19 RX ADMIN — ENOXAPARIN SODIUM 40 MG: 40 INJECTION SUBCUTANEOUS at 05:06

## 2023-06-19 RX ADMIN — METOPROLOL TARTRATE 25 MG: 25 TABLET, FILM COATED ORAL at 09:06

## 2023-06-19 RX ADMIN — Medication 6 MG: at 08:06

## 2023-06-19 RX ADMIN — THERA TABS 1 TABLET: TAB at 09:06

## 2023-06-19 RX ADMIN — Medication 6 MG: at 01:06

## 2023-06-19 RX ADMIN — HYPROMELLOSE 2910 1 DROP: 5 SOLUTION/ DROPS OPHTHALMIC at 06:06

## 2023-06-19 RX ADMIN — POTASSIUM CHLORIDE 40 MEQ: 1500 TABLET, EXTENDED RELEASE ORAL at 09:06

## 2023-06-19 RX ADMIN — POTASSIUM CHLORIDE 40 MEQ: 1500 TABLET, EXTENDED RELEASE ORAL at 08:06

## 2023-06-19 RX ADMIN — QUETIAPINE FUMARATE 100 MG: 100 TABLET ORAL at 08:06

## 2023-06-19 RX ADMIN — POTASSIUM CHLORIDE 10 MEQ: 7.46 INJECTION, SOLUTION INTRAVENOUS at 01:06

## 2023-06-19 RX ADMIN — CEFTRIAXONE 1 G: 1 INJECTION, POWDER, FOR SOLUTION INTRAMUSCULAR; INTRAVENOUS at 05:06

## 2023-06-19 RX ADMIN — NIFEDIPINE 30 MG: 30 TABLET, FILM COATED, EXTENDED RELEASE ORAL at 03:06

## 2023-06-19 RX ADMIN — DIPHENHYDRAMINE HYDROCHLORIDE 25 MG: 50 INJECTION, SOLUTION INTRAMUSCULAR; INTRAVENOUS at 03:06

## 2023-06-19 RX ADMIN — FAMOTIDINE 20 MG: 20 TABLET, FILM COATED ORAL at 08:06

## 2023-06-19 RX ADMIN — LABETALOL HYDROCHLORIDE 10 MG: 5 INJECTION INTRAVENOUS at 05:06

## 2023-06-19 RX ADMIN — FAMOTIDINE 20 MG: 20 TABLET, FILM COATED ORAL at 09:06

## 2023-06-19 RX ADMIN — METOPROLOL TARTRATE 25 MG: 25 TABLET, FILM COATED ORAL at 08:06

## 2023-06-19 RX ADMIN — POTASSIUM CHLORIDE 10 MEQ: 7.46 INJECTION, SOLUTION INTRAVENOUS at 09:06

## 2023-06-19 RX ADMIN — THIAMINE HYDROCHLORIDE 100 MG: 100 INJECTION, SOLUTION INTRAMUSCULAR; INTRAVENOUS at 09:06

## 2023-06-19 NOTE — PLAN OF CARE
Continue OT POC.  Max A x2 for bed mobility. Min A-Max A for sitting balance d/t posterior lean. Pt hallucinating but oriented. Difficulty initiating tasks. Pt noted to have tremors.

## 2023-06-19 NOTE — ASSESSMENT & PLAN NOTE
CT head and MRI brain with no acute findings   Likely toxic metabolic encephalopathy in setting of methanol toxicity. Differential also includes exacerbation of underlying psychiatric condition vs. Ativan withdrawal (pt reportedly was taking anxiety meds belonging to  and mother, UDS neg on admission for benzos) vs. Wernicke encephalopathy   - TSH wnl on admission. Will obtain B12, folate, RPR, ammonia, thiamine level to round out metabolic work up   - Consult neurology to evaluate   - Re consult psychiatry to eval persistent hallucinations and agitation   - PT/OT rec rehab placement but at this time, given ongoing hallucinations and agitation, unsure if pt would be able to meaningfully participate with rehab

## 2023-06-19 NOTE — ASSESSMENT & PLAN NOTE
Large right upper lobe opacity favoring pneumonia  Empirically started on vancomycin, cefepime and azithromycin initially while in ICU.   Endotracheal aspirate sent for culture, final culture pending, no staph or pseudomonas isolated.   Transitioned to ceftriaxone and azithromycin (Day 7 of abx)   Currently stable on room air

## 2023-06-19 NOTE — PLAN OF CARE
Problem: Adult Inpatient Plan of Care  Goal: Plan of Care Review  Outcome: Ongoing, Progressing  Goal: Absence of Hospital-Acquired Illness or Injury  Outcome: Ongoing, Progressing  Intervention: Prevent Skin Injury  Flowsheets (Taken 6/18/2023 1915)  Skin Protection:   adhesive use limited   incontinence pads utilized   protective footwear used  Intervention: Prevent Infection  Flowsheets (Taken 6/18/2023 1915)  Infection Prevention:   environmental surveillance performed   hand hygiene promoted   single patient room provided   rest/sleep promoted   equipment surfaces disinfected  Goal: Optimal Comfort and Wellbeing  Outcome: Ongoing, Progressing  Intervention: Monitor Pain and Promote Comfort  Flowsheets (Taken 6/18/2023 1915)  Pain Management Interventions:   care clustered   pain management plan reviewed with patient/caregiver   position adjusted   quiet environment facilitated     Problem: Fall Injury Risk  Goal: Absence of Fall and Fall-Related Injury  Outcome: Ongoing, Progressing  Intervention: Identify and Manage Contributors  Flowsheets (Taken 6/18/2023 1915)  Self-Care Promotion: BADL personal objects within reach

## 2023-06-19 NOTE — PLAN OF CARE
Per Dr. Burris, patient's mental condition would not allow her to participate in Rehab at present.  Holding off on sending referrals until psychiatry sees her again.

## 2023-06-19 NOTE — PLAN OF CARE
[facility] - Tele[facility] (Hospital)  Discharge Reassessment    Primary Care Provider: Flavia Fink DO    Expected Discharge Date:     Reassessment (most recent)       Discharge Reassessment - 06/19/23 1540          Discharge Reassessment    Assessment Type Discharge Planning Reassessment (P)      Did the patient's condition or plan change since previous assessment? Yes (P)      Discharge Plan discussed with: Patient;Spouse/sig other (P)      Communicated MANDEEP with patient/caregiver Date not available/Unable to determine (P)      Discharge Plan A Rehab (P)      DME Needed Upon Discharge  none (P)      Transition of Care Barriers None (P)      Why the patient remains in the hospital Requires continued medical care (P)         Post-Acute Status    Patient choice form signed by patient/caregiver List from System Post-Acute Care (P)      Discharge Delays None known at this time (P)                    Spoke with spouse and patient.  Discharge plan is inpatient rehab. Spouse has Rehab list.  Advised we are waiting for patient to be closer to being able to participate in therapy before sending referrals.

## 2023-06-19 NOTE — ASSESSMENT & PLAN NOTE
Patient admits to ongoing increased anxiety over the past several days prior to ED presentation, however  reports that this has been ongoing for at least a couple months she had had patient has attempted to try over-the-counter meds such as Ashwaganda and that she has tried some anxiety meds that have been prescribed to her mother and  with their knowledge

## 2023-06-19 NOTE — PROGRESS NOTES
"[facility] Medicine  Progress Note    Patient Name: Shania Tapia  MRN: [id number]  Patient Class: IP- Inpatient   Admission Date: 6/13/2023  Length of Stay: 5 days  Attending Physician: Ruthann Burris MD  Primary Care Provider: Flavia Fink DO        Subjective:     Principal Problem:Methanol poisoning        HPI:  Shania Tapia, a 37-year-old female with a history of Anxiety and Hypertension, was admitted to the ICU on 6/13/2023 following an emergency department presentation for blurry vision, altered color vision, word-finding difficulties, unsteady gait, and jitteriness, which she initially associated with her anxiety. Subsequently, she experienced suspected seizure activity and required emergent intubation for airway protection.    Initial workup demonstrated severe high anion gap metabolic acidosis and a dense right upper lobe pneumonia. Suspecting possible toxic alcohol poisoning, specifically methanol (given her vision changes), she was started on fomepizole (last dose on 06/16) and bicarbonate infusion. Renal team initiated emergency hemodialysis (06/14) , and empiric antibiotic therapy was initiated for pneumonia.    After acidosis resolution, bicarbonate infusion was discontinued, and she was successfully extubated on 6/15. 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     Psychiatric consultation deemed her not to meet the criteria for a PEC, although escitalopram was suggested to manage her anxiety. Collateral information from her  did not suggest any suicidal ideation or attempts. He noted, however, significant job-related stress and anxiety in recent months, including stress-induced tremors. She had used various over-the-counter supplements and borrowed prescription anti-anxiety medications from her family members, " albeit in a non-seeking manner, for self-management.  The patient was deemed stable for transfer out of the ICU on 6/16/2023.      Overview/Hospital Course:  Since stepdown, Labs returned on 6/16/2023 in the evening confirming presence of Methanol. Send out Volatile labs returned POSITIVE on 6/17/2023 for likely Methanol. Minimal change in mentation since being on the floor, pt has had difficulty sleeping, agitation, auditory and visual hallucinations. . Seroquel ordered for symptoms. Psych consulted for management.     PT/OT recommend rehab placement at this time  SLP recommend Soft & Bite Sized Diet - IDDSI Level 6, Thin liquids - IDDSI Level 0    06/19: Methanol level from 06/14 resulted at 123. Pt oriented x person, place, time but continues to have visual and auditory hallucinations. Pt seen with RN and mother at bedside. Per pt mother, pt has been having progressive tremors, anxiety and confusion since may but initially symptoms were attributed to work related anxiety. Pt mother reports no known ingestions or known self harm issues but reports that pt may have exposures at work as she works as a . Psychiatry reconsulted. [place] Poison Control/Toxicology notified re: case.         Interval History: See Hospital Course     Review of Systems  Objective:     Vital Signs (Most Recent):  Temp: 99.5 °F (37.5 °C) (06/19/23 0859)  Pulse: (!) 112 (06/19/23 0859)  Resp: 18 (06/19/23 0859)  BP: (!) 171/109 (06/19/23 0859)  SpO2: 97 % (06/19/23 0859) Vital Signs (24h Range):  Temp:  [98.7 °F (37.1 °C)-102.2 °F (39 °C)] 99.5 °F (37.5 °C)  Pulse:  [100-119] 112  Resp:  [16-20] 18  SpO2:  [96 %-99 %] 97 %  BP: (141-181)/() 171/109     Weight: 68.4 kg (150 lb 12.7 oz)  Body mass index is 28.49 kg/m².    Intake/Output Summary (Last 24 hours) at 6/19/2023 1310  Last data filed at 6/19/2023 1022  Gross per 24 hour   Intake --   Output 1150 ml   Net -1150 ml         Physical Exam  Vitals and nursing note  reviewed. Exam conducted with a chaperone present.   Constitutional:       General: She is not in acute distress.     Appearance: She is ill-appearing.   Cardiovascular:      Rate and Rhythm: Normal rate and regular rhythm.      Heart sounds: No murmur heard.    No friction rub. No gallop.   Pulmonary:      Effort: Pulmonary effort is normal.      Breath sounds: Normal breath sounds. No wheezing, rhonchi or rales.      Comments: On room air   Abdominal:      General: Bowel sounds are normal. There is no distension.      Palpations: Abdomen is soft.      Tenderness: There is no abdominal tenderness. There is no guarding or rebound.   Genitourinary:     Comments: deferred  Musculoskeletal:      Right lower leg: No edema.      Left lower leg: No edema.      Comments: + tremors of BUE    Neurological:      Mental Status: She is alert.      Comments: Oriented to self, place, date but not situations, answers some questions appropriately, actively responding to external stimuli during encounter.    Psychiatric:      Comments: Anxious, appears paranoid, + auditory and visual hallucinations            Significant Labs: All pertinent labs within the past 24 hours have been reviewed.    Significant Imaging: I have reviewed all pertinent imaging results/findings within the past 24 hours.      Assessment/Plan:      * Methanol poisoning  - Likely contributing to encephalopathy as above   - Methanol level 123 on 06/14/2023  - Nephrology consulted; s/p HD on 06/14  - Received Fomepizole (Last dose on 06/16/2023)   - Discussed with on call  with [place] Poison Control- given methanol level on admission, most likely acute exposure/ingestion rather than cumulative exposure        Encephalopathy  CT head and MRI brain with no acute findings   Likely toxic metabolic encephalopathy in setting of methanol toxicity. Differential also includes exacerbation of underlying psychiatric condition vs. Ativan withdrawal (pt  reportedly was taking anxiety meds belonging to  and mother, UDS neg on admission for benzos) vs. Wernicke encephalopathy   - TSH wnl on admission. Will obtain B12, folate, RPR, ammonia, thiamine level to round out metabolic work up   - Consult neurology to evaluate   - Re consult psychiatry to eval persistent hallucinations and agitation   - PT/OT rec rehab placement but at this time, given ongoing hallucinations and agitation, unsure if pt would be able to meaningfully participate with rehab         Metabolic acidosis  Initially presented with high anion gap metabolic acidosis and high osmolar gap with CO2 < 5, pH 7.0 Severe and out of proportion to lactate and ketones.  Salicylates negative.Concern for toxic alcohol ingestion, Methanol level +, 123   Nephrology consulted, patient had emergency HD done 06/14 in addition to initiation bicarbonate infusion   Trialysis catheter removed on 6/16/23  Acidosis improved   Nephrology consulted and following; appreciate recs   Monitor BMP     Hypophosphatemia  Replete as indicated   Monitor    Hypomagnesemia  Replete as indicated   Monitor    Hypokalemia  Aggressive repletions ordered  Repeat EKG to eval QTC as QT was prolonged on admission   Monitor BMP     Anxiety  Patient admits to ongoing increased anxiety over the past several days prior to ED presentation, however  reports that this has been ongoing for at least a couple months she had had patient has attempted to try over-the-counter meds such as Ashwaganda and that she has tried some anxiety meds that have been prescribed to her mother and  with their knowledge       Hallucinations  Per report, pt suffers from anxiety, reports to a lot of stress and anxiety associated with her job in addition to unintentional 10-15 lb weight loss over the last couple of months  Tele psych consulted on  06/16 and 06/17, pt did not meet criterial for PEC  Per pt mother, no prev hx of self- harm   Pt has worsening  hallucinations and agitation, required Haldol and Ativan x 1 on 06/18. Per family, pt has not been sleeping at all.   Seroquel started on 06/18  Will reconsult psychiatry to eval       Severe sepsis  This patient does have evidence of infective focus  My overall impression is sepsis.  Source: Respiratory  Antibiotics given-   Antibiotics (72h ago, onward)    Start     Stop Route Frequency Ordered    06/15/23 1700  cefTRIAXone (ROCEPHIN) 1 g in dextrose 5 % in water (D5W) 5 % 100 mL IVPB (MB+)         06/21 1659 IV Every 24 hours (non-standard times) 06/15/23 1416        Latest lactate reviewed-  No results for input(s): LACTATE in the last 72 hours.  Organ dysfunction indicated by Encephalopathy  Will Not start Pressors  Source control achieved by: IV antibiotics    Acute respiratory failure with hypoxia  Patient required emergent intubation for airway protection in ED, successfully extubated on 06/15/2023 and transitioned to nasal cannula, weaned off supplemental O2. Currently stable on room air   Continue treatment for pneumonia    Pneumonia of right upper lobe due to infectious organism  Large right upper lobe opacity favoring pneumonia  Empirically started on vancomycin, cefepime and azithromycin initially while in ICU.   Endotracheal aspirate sent for culture, final culture pending, no staph or pseudomonas isolated.   Transitioned to ceftriaxone and azithromycin (Day 7 of abx)   Currently stable on room air        Essential hypertension  - resume home Nifedipine   - PRN IV Hydralazine   - monitor BP         VTE Risk Mitigation (From admission, onward)         Ordered     enoxaparin injection 40 mg  Daily         06/14/23 0122                Discharge Planning   MANDEEP:      Code Status: Full Code   Is the patient medically ready for discharge?: No    Reason for patient still in hospital (select all that apply): Patient trending condition, Treatment, Consult recommendations and Pending disposition  Discharge Plan  A: Rehab                  Ruthann Burris MD  Department of Hospital Medicine   [facility] - Telemetry ([facility])

## 2023-06-19 NOTE — ASSESSMENT & PLAN NOTE
Per report, pt suffers from anxiety, reports to a lot of stress and anxiety associated with her job in addition to unintentional 10-15 lb weight loss over the last couple of months  Tele psych consulted on  06/16 and 06/17, pt did not meet criterial for PEC  Per pt mother, no prev hx of self- harm   Pt has worsening hallucinations and agitation, required Haldol and Ativan x 1 on 06/18. Per family, pt has not been sleeping at all.   Seroquel started on 06/18  Will reconsult psychiatry to aries

## 2023-06-19 NOTE — ASSESSMENT & PLAN NOTE
- Likely contributing to encephalopathy as above   - Methanol level 123 on 06/14/2023  - Nephrology consulted; s/p HD on 06/14  - Received Fomepizole (Last dose on 06/16/2023)   - Discussed with on call  with [place] [facility]ison [facility]- given methanol level on admission, most likely acute exposure/ingestion rather than cumulative exposure

## 2023-06-19 NOTE — ASSESSMENT & PLAN NOTE
Initially presented with high anion gap metabolic acidosis and high osmolar gap with CO2 < 5, pH 7.0 Severe and out of proportion to lactate and ketones.  Salicylates negative.Concern for toxic alcohol ingestion, Methanol level +, 123   Nephrology consulted, patient had emergency HD done 06/14 in addition to initiation bicarbonate infusion   Trialysis catheter removed on 6/16/23  Acidosis improved   Nephrology consulted and following; appreciate recs   Monitor BMP

## 2023-06-19 NOTE — ASSESSMENT & PLAN NOTE
This patient does have evidence of infective focus  My overall impression is sepsis.  Source: Respiratory  Antibiotics given-   Antibiotics (72h ago, onward)    Start     Stop Route Frequency Ordered    06/15/23 1700  cefTRIAXone (ROCEPHIN) 1 g in dextrose 5 % in water (D5W) 5 % 100 mL IVPB (MB+)         06/21 1659 IV Every 24 hours (non-standard times) 06/15/23 1416        Latest lactate reviewed-  No results for input(s): LACTATE in the last 72 hours.  Organ dysfunction indicated by Encephalopathy  Will Not start Pressors  Source control achieved by: IV antibiotics

## 2023-06-19 NOTE — ASSESSMENT & PLAN NOTE
Aggressive repletions ordered  Repeat EKG to eval QTC as QT was prolonged on admission   Monitor BMP

## 2023-06-19 NOTE — PT/OT/SLP PROGRESS
Physical Therapy  Treatment    Shania Tapia   MRN: [id number]   Admitting Diagnosis: Metabolic acidosis    PT Received On: 06/19/23  PT Start Time: 0945     PT Stop Time: 1025    PT Total Time (min): 40 min       Billable Minutes:  Therapeutic Activity 40    Treatment Type: Treatment  PT/PTA: PTA     Number of PTA visits since last PT visit: 2       General Precautions: Standard, fall  Orthopedic Precautions: N/A  Braces: N/A  Respiratory Status: Room air    Spiritual, Cultural Beliefs, Rastafarian Practices, Values that Affect Care: no    Subjective:  Communicated with patient's nurse, Ozzy, and completed Epic chart review prior to session.  Patient agreed to PT session. Patient admits to having visual and auditory hallucinations which occurred throughout session.     Pain/Comfort  Pain Rating 1: 0/10    Objective:   Patient found with: PICC line, telemetry, braxton catheter, bed alarm    Patient given re orientation as needed when she would become distracted/confused by hallucinations.     Supine > sit EOB: Max A of 2 (required increased time to complete)    Forward scoot towards EOB: Max A of 2    Seated EOB x 20 min total focusing on increased tolerance to upright position, core stability, trunk control and CV endurance.   Maintained self supported sitting balance with Min-Max A (due to posterior lean that was inconsistent in severity)  Unable to maintain unsupported sitting balance.  Required multiple verbal and tactile cues to achieve and maintain upright position as well as midline  Patient was unable to consistently self correct nor maintain assisted corrections    At this time, patient is too unsafe to attempt standing. Patient is demonstrating very poor sitting balance, difficulty following commands, intermittent tremors and decreased overall motor control.     Sit > supine: Max A of 2     Roll R/L: Max A of 2    Supine scoot towards HOB: Total A of 2    Educated patient on importance of increased  tolerance to upright position and direct impact on CV endurance and strength. Patient encouraged to utilize elevated HOB to simulate chair position until able to safely complete chair T/F. Patient given a minimum goal of majority of the day to be spent in upright, especially with all meals. Encouraged patient to perform AROM TE to BLE throughout the day within all available planes of motion. Re enforced importance of utilizing call light to meet needs in room and not attempt to get up without staff assistance. Patient verbalized understanding and agreed to comply. Unsure of patient's level of education retention at this time.     AM-PAC 6 CLICK MOBILITY  How much help from another person does this patient currently need?   1 = Unable, Total/Dependent Assistance  2 = A lot, Maximum/Moderate Assistance  3 = A little, Minimum/Contact Guard/Supervision  4 = None, Modified [place]/Independent    Turning over in bed (including adjusting bedclothes, sheets and blankets)?: 2  Sitting down on and standing up from a chair with arms (e.g., wheelchair, bedside commode, etc.): 1  Moving from lying on back to sitting on the side of the bed?: 2  Moving to and from a bed to a chair (including a wheelchair)?: 1  Need to walk in hospital room?: 1  Climbing 3-5 steps with a railing?: 1  Basic Mobility Total Score: 8    AM-PAC Raw Score CMS G-Code Modifier Level of Impairment Assistance   6 % Total / Unable   7 - 9 CM 80 - 100% Maximal Assist   10 - 14 CL 60 - 80% Moderate Assist   15 - 19 CK 40 - 60% Moderate Assist   20 - 22 CJ 20 - 40% Minimal Assist   23 CI 1-20% SBA / CGA   24 CH 0% Independent/ Mod I     Patient left with bed in chair position with call button in reach, bed alarm on, and mother present.    Assessment:  Shania Tapia is a 37 y.o. female with a medical diagnosis of Metabolic acidosis and presents with overall decline in functional mobility. Patient would continue to benefit from skilled PT to  address functional limitations listed below in order to return to PLOF/decrease caregiver burden. Patient with an overall reduced ability to perform in today's session due to weakness, ataxic like movements and poor ability to remain upright.     Rehab identified problem list/impairments: weakness, impaired endurance, impaired sensation, impaired self care skills, impaired functional mobility, gait instability, impaired balance, impaired cognition, decreased coordination, decreased upper extremity function, decreased lower extremity function, decreased safety awareness, decreased ROM, abnormal tone, impaired coordination, impaired fine motor, impaired cardiopulmonary response to activity    Rehab potential is fair.    Activity tolerance: Poor    Discharge recommendations: rehabilitation facility      Barriers to discharge:      Equipment recommendations: to be determined by next level of care     GOALS:   Multidisciplinary Problems       Physical Therapy Goals          Problem: Physical Therapy    Goal Priority Disciplines Outcome Goal Variances Interventions   Physical Therapy Goal     PT, PT/OT      Description: Pt will perform bed mobility independently in order to participate in EOB activity.  Pt will perform transfers independently in order to participate in OOB activity.   Pt will ambulate 150ft mod I with LRAD in order to participate in daily tasks.                         PLAN:    Patient to be seen 3 x/week to address the above listed problems via gait training, therapeutic activities, therapeutic exercises, neuromuscular re-education  Plan of Care expires: 06/30/23  Plan of Care reviewed with: patient, mother         06/19/2023

## 2023-06-19 NOTE — PROGRESS NOTES
[facility] - Telemetry ([facility])  Nephrology  Progress Note    Patient Name: Shania Tapia  MRN: [id number]  Admission Date: 6/13/2023  Hospital Length of Stay: 5 days  Attending Provider: Ruthann Burris MD   Primary Care Physician: Flavia Fink DO  Principal Problem:Methanol poisoning  Reason for Consult: Metabolic Acidosis       Subjective:     History:   38 yo female admitted with profound metabolic acidosis with unmeasurable bicarb and pH 7.0, urgent dialysis. Osmolar gap noted, suspicion for alcohol and specifically methanol given vision changes     6/16  - feels vision is improving  - has no memory recall for past hospital events   - feels hungry, seen by ST and diet was recommended  - notes swelling of legs    6/17  - still with abnormal vision  - eating a little more     6/18  - active hallucinations during my visit,  at bedside reports similar  - Seroquel initiated     6/19  - OT , PT and ST seeing pt     Review of patient's allergies indicates:   Allergen Reactions    Latex Rash    Latex, natural rubber Rash     Current Facility-Administered Medications   Medication Frequency    0.9%  NaCl infusion PRN    acetaminophen tablet 650 mg Q6H PRN    artificial tears 0.5 % ophthalmic solution 1 drop PRN    cefTRIAXone (ROCEPHIN) 1 g in dextrose 5 % in water (D5W) 5 % 100 mL IVPB (MB+) Q24H    dextrose 10% bolus 125 mL 125 mL PRN    dextrose 10% bolus 250 mL 250 mL PRN    diphenhydrAMINE injection 25 mg Q6H PRN    enoxaparin injection 40 mg Daily    famotidine tablet 20 mg BID    labetaloL injection 10 mg Q6H PRN    melatonin tablet 6 mg Nightly PRN    metoprolol tartrate (LOPRESSOR) tablet 25 mg BID    multivitamin tablet Daily    NIFEdipine 24 hr tablet 30 mg Daily    ondansetron injection 4 mg Q8H PRN    potassium chloride SA CR tablet 40 mEq BID    QUEtiapine tablet 100 mg QHS    senna-docusate 8.6-50 mg per tablet 2 tablet BID    thiamine (B-1) 100 mg in dextrose 5 % (D5W) 100 mL IVPB Daily        Objective:     Vital Signs (Most Recent):  Temp: 98 °F (36.7 °C) (06/19/23 1639)  Pulse: (!) 112 (06/19/23 1639)  Resp: 18 (06/19/23 1639)  BP: (!) 190/102 (06/19/23 1639)  SpO2: 97 % (06/19/23 1639) Vital Signs (24h Range):  Temp:  [98 °F (36.7 °C)-100.4 °F (38 °C)] 98 °F (36.7 °C)  Pulse:  [100-119] 112  Resp:  [16-20] 18  SpO2:  [96 %-99 %] 97 %  BP: (141-193)/() 190/102     Weight: 68.4 kg (150 lb 12.7 oz) (06/16/23 0500)  Body mass index is 28.49 kg/m².  Body surface area is 1.72 meters squared.    I/O last 3 completed shifts:  In: -   Out: 2825 [Urine:2825]    Physical Exam  Vitals and nursing note reviewed.       Significant Labs: reviewed      Assessment/Plan:     Active Diagnoses:    Diagnosis Date Noted POA    PRINCIPAL PROBLEM:  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] 06/19/2023 Yes    Anxiety [F41.9] 06/16/2023 Yes    Hypokalemia [E87.6] 06/16/2023 Yes    Hypomagnesemia [E83.42] 06/16/2023 Yes    Hypophosphatemia [E83.39] 06/16/2023 Yes    Hallucinations [R44.3] 06/15/2023 Unknown    Encephalopathy [G93.40] 06/14/2023 Yes    Pneumonia of right upper lobe due to infectious organism [J18.9] 06/14/2023 Yes    Acute respiratory failure with hypoxia [J96.01] 06/14/2023 Yes    Severe sepsis [A41.9, R65.20] 06/14/2023 Yes    Metabolic acidosis [E87.20] 06/14/2023 Yes    Essential hypertension [I10] 09/23/2020 Yes      Problems Resolved During this Admission:    Diagnosis Date Noted Date Resolved POA    High serum osmolar gap [R74.8] 06/15/2023 06/19/2023 Yes         Metabolic acidosis, AG at presentation with osmolar gap, required RRT for treatment  - methanol identified     Hypernatremia  - po intake to self correct    Hypophos/Hypokalemia/HypoMag  - phos and potassium under repletion  - continue to monitor levels but as taking in po should improve     Following from marin Rangel MD  Nephrology

## 2023-06-19 NOTE — SUBJECTIVE & OBJECTIVE
Interval History: See Hospital Course     Review of Systems  Objective:     Vital Signs (Most Recent):  Temp: 99.5 °F (37.5 °C) (06/19/23 0859)  Pulse: (!) 112 (06/19/23 0859)  Resp: 18 (06/19/23 0859)  BP: (!) 171/109 (06/19/23 0859)  SpO2: 97 % (06/19/23 0859) Vital Signs (24h Range):  Temp:  [98.7 °F (37.1 °C)-102.2 °F (39 °C)] 99.5 °F (37.5 °C)  Pulse:  [100-119] 112  Resp:  [16-20] 18  SpO2:  [96 %-99 %] 97 %  BP: (141-181)/() 171/109     Weight: 68.4 kg (150 lb 12.7 oz)  Body mass index is 28.49 kg/m².    Intake/Output Summary (Last 24 hours) at 6/19/2023 1310  Last data filed at 6/19/2023 1022  Gross per 24 hour   Intake --   Output 1150 ml   Net -1150 ml         Physical Exam  Vitals and nursing note reviewed. Exam conducted with a chaperone present.   Constitutional:       General: She is not in acute distress.     Appearance: She is ill-appearing.   Cardiovascular:      Rate and Rhythm: Normal rate and regular rhythm.      Heart sounds: No murmur heard.    No friction rub. No gallop.   Pulmonary:      Effort: Pulmonary effort is normal.      Breath sounds: Normal breath sounds. No wheezing, rhonchi or rales.      Comments: On room air   Abdominal:      General: Bowel sounds are normal. There is no distension.      Palpations: Abdomen is soft.      Tenderness: There is no abdominal tenderness. There is no guarding or rebound.   Genitourinary:     Comments: deferred  Musculoskeletal:      Right lower leg: No edema.      Left lower leg: No edema.      Comments: + tremors of BUE    Neurological:      Mental Status: She is alert.      Comments: Oriented to self, place, date but not situations, answers some questions appropriately, actively responding to external stimuli during encounter.    Psychiatric:      Comments: Anxious, appears paranoid, + auditory and visual hallucinations            Significant Labs: All pertinent labs within the past 24 hours have been reviewed.    Significant Imaging: I have  reviewed all pertinent imaging results/findings within the past 24 hours.

## 2023-06-19 NOTE — ASSESSMENT & PLAN NOTE
Patient required emergent intubation for airway protection in ED, successfully extubated on 06/15/2023 and transitioned to nasal cannula, weaned off supplemental O2. Currently stable on room air   Continue treatment for pneumonia

## 2023-06-19 NOTE — PT/OT/SLP PROGRESS
Occupational Therapy   Treatment    Name: Shania Tapia  MRN: [id number]  Admitting Diagnosis:  Methanol poisoning       Recommendations:     Discharge Recommendations: rehabilitation facility  Discharge Equipment Recommendations:  to be determined by next level of care  Barriers to discharge:  None    Assessment:     Shania Tapia is a 37 y.o. female with a medical diagnosis of Methanol poisoning.  She presents with the following performance deficits affecting function are weakness, impaired endurance, impaired sensation, impaired self care skills, impaired functional mobility, gait instability, impaired balance, impaired cognition, decreased coordination, decreased upper extremity function, decreased lower extremity function, decreased safety awareness, abnormal tone, decreased ROM, impaired coordination, impaired fine motor, impaired cardiopulmonary response to activity.     Rehab Prognosis:  Fair; patient would benefit from acute skilled OT services to address these deficits and reach maximum level of function.       Plan:     Patient to be seen 2 x/week to address the above listed problems via self-care/home management, therapeutic activities, therapeutic exercises  Plan of Care Expires: 06/30/23  Plan of Care Reviewed with: patient, mother    Subjective     Chief Complaint: hallucinations  Patient/Family Comments/goals: get better  Pain/Comfort:  Pain Rating 1: 0/10    Pt's mother reporting pt has not slept in ~4 days.  Objective:     Communicated with: Nurse and epic chart review prior to session.  Patient found HOB elevated with PICC line, telemetry, brxaton catheter, bed alarm upon OT entry to room.    General Precautions: Standard, fall, aphasia    Orthopedic Precautions:N/A  Braces: N/A  Respiratory Status: Room air     Occupational Performance:     Bed Mobility:    Patient completed Rolling/Turning to Left with  maximal assistance and 2 persons  Patient completed Rolling/Turning to Right with  maximal assistance and 2 persons  Patient completed Supine to Sit with maximal assistance and 2 persons  Patient completed Sit to Supine with maximal assistance and 2 persons   Forward scoot to EOB with Max A x2.  Supine scoot to HOB with Total A x2.    Functional Mobility/Transfers:  Unable to perform at this time d/t safety concerns.    Activities of Daily Living:  Grooming: Pt performed the following tasks :  Brushed hair with total A while EOB.  Performed oral care with Min A while sitting with bed in chair position.   Washed face with CGA while EOB.    [facility] 6 Click ADL: 8    Treatment & Education:  Pt sitting EOB ~20 minutes, requiring Min A-Max A for self supported sitting balance d/t inconsistent posterior lean. Pt unable to maintain unsupported sitting balance. Required verbal and tactile cueing throughout to maintain upright position and midline orientation. Pt with poor ability to self correct/maintain upright sitting posture. Pt with difficulty following commands and required re-direction to tasks d/t confusion and distracted by hallucinations. Pt demonstrating intermittent tremors in BUE and decreased motor control. Pt with difficulty initiating tasks and demonstrating dysmetria when reaching for objects.  Reviewed role of OT in acute setting and benefits of participation. Educated on techniques to use to increase independence and improve bed mobility. Educated on importance of OOB activity and calling for A to transfer and meet needs. Encouraged completion of B UE AROM therex throughout the day to tolerance to increase functional strength and activity tolerance.Educated patient on importance of increased tolerance to upright position and direct impact on CV endurance and strength. Patient encouraged to utilize elevated HOB to simulate chair position until able to safely complete chair T/F. Patient given a minimum goal of majority of the day to be spent in upright, especially with all meals. Patient  stated understanding and in agreement with POC. Unsure of patient's level of retention at this time d/t cognition.    Patient left with bed in chair position with all lines intact, call button in reach, bed alarm on, and mother present    GOALS:   Multidisciplinary Problems       Occupational Therapy Goals          Problem: Occupational Therapy    Goal Priority Disciplines Outcome Interventions   Occupational Therapy Goal     OT, PT/OT Ongoing, Progressing    Description: Goals to be met by: 6/30/23     Patient will increase functional independence with ADLs by performing:    Toileting from toilet with Minimal Assistance for hygiene and clothing management.   Toilet transfer to toilet with Minimal Assistance.  Increased functional strength in B UE grossly by 1/2 MM grade.                         Time Tracking:     OT Date of Treatment: 06/19/23  OT Start Time: 0945  OT Stop Time: 1025  OT Total Time (min): 40 min    Billable Minutes:Self Care/Home Management 10  Therapeutic Activity 30    OT/MAGDALENO: CLINTON Ervin OT     6/19/2023

## 2023-06-20 VITALS
WEIGHT: 154.13 LBS | BODY MASS INDEX: 29.1 KG/M2 | HEIGHT: 61 IN | RESPIRATION RATE: 17 BRPM | TEMPERATURE: 101 F | HEART RATE: 117 BPM | OXYGEN SATURATION: 100 % | DIASTOLIC BLOOD PRESSURE: 102 MMHG | SYSTOLIC BLOOD PRESSURE: 159 MMHG

## 2023-06-20 PROBLEM — R56.9 WITNESSED SEIZURE: Status: ACTIVE | Noted: 2023-06-20

## 2023-06-20 PROBLEM — G40.901 STATUS EPILEPTICUS: Status: ACTIVE | Noted: 2023-06-20

## 2023-06-20 LAB
ALBUMIN SERPL BCP-MCNC: 2.9 G/DL (ref 3.5–5.2)
ALBUMIN SERPL BCP-MCNC: 3.1 G/DL (ref 3.5–5.2)
ALLENS TEST: ABNORMAL
ALP SERPL-CCNC: 85 U/L (ref 55–135)
ALP SERPL-CCNC: 97 U/L (ref 55–135)
ALT SERPL W/O P-5'-P-CCNC: 20 U/L (ref 10–44)
ALT SERPL W/O P-5'-P-CCNC: 24 U/L (ref 10–44)
ANION GAP SERPL CALC-SCNC: 13 MMOL/L (ref 8–16)
ANION GAP SERPL CALC-SCNC: 16 MMOL/L (ref 8–16)
ARSENIC BLD-MCNC: <1 NG/ML
AST SERPL-CCNC: 32 U/L (ref 10–40)
AST SERPL-CCNC: 34 U/L (ref 10–40)
BASOPHILS # BLD AUTO: 0.03 K/UL (ref 0–0.2)
BASOPHILS NFR BLD: 0.6 % (ref 0–1.9)
BILIRUB DIRECT SERPL-MCNC: 0.2 MG/DL (ref 0.1–0.3)
BILIRUB SERPL-MCNC: 0.4 MG/DL (ref 0.1–1)
BILIRUB SERPL-MCNC: 0.4 MG/DL (ref 0.1–1)
BUN SERPL-MCNC: 11 MG/DL (ref 6–20)
BUN SERPL-MCNC: 12 MG/DL (ref 6–20)
CADMIUM BLD-MCNC: 1 NG/ML
CALCIUM SERPL-MCNC: 9 MG/DL (ref 8.7–10.5)
CALCIUM SERPL-MCNC: 9.1 MG/DL (ref 8.7–10.5)
CHLORIDE SERPL-SCNC: 104 MMOL/L (ref 95–110)
CHLORIDE SERPL-SCNC: 109 MMOL/L (ref 95–110)
CITY: NORMAL
CK SERPL-CCNC: 218 U/L (ref 20–180)
CO2 SERPL-SCNC: 24 MMOL/L (ref 23–29)
CO2 SERPL-SCNC: 24 MMOL/L (ref 23–29)
COUNTY: NORMAL
CREAT SERPL-MCNC: 0.8 MG/DL (ref 0.5–1.4)
CREAT SERPL-MCNC: 0.9 MG/DL (ref 0.5–1.4)
DELSYS: ABNORMAL
DIFFERENTIAL METHOD: ABNORMAL
EOSINOPHIL # BLD AUTO: 0.1 K/UL (ref 0–0.5)
EOSINOPHIL NFR BLD: 2.2 % (ref 0–8)
ERYTHROCYTE [DISTWIDTH] IN BLOOD BY AUTOMATED COUNT: 20.9 % (ref 11.5–14.5)
EST. GFR  (NO RACE VARIABLE): >60 ML/MIN/1.73 M^2
EST. GFR  (NO RACE VARIABLE): >60 ML/MIN/1.73 M^2
FIO2: 32
FLOW: 3
FOLATE SERPL-MCNC: 19.3 NG/ML (ref 4–24)
GLUCOSE SERPL-MCNC: 179 MG/DL (ref 70–110)
GLUCOSE SERPL-MCNC: 184 MG/DL (ref 70–110)
GLUCOSE SERPL-MCNC: 90 MG/DL (ref 70–110)
GUARDIAN FIRST NAME: NORMAL
GUARDIAN LAST NAME: NORMAL
HCO3 UR-SCNC: 19.3 MMOL/L (ref 24–28)
HCT VFR BLD AUTO: 28.8 % (ref 37–48.5)
HCT VFR BLD CALC: 33 %PCV (ref 36–54)
HGB BLD-MCNC: 8.4 G/DL (ref 12–16)
HOME PHONE: NORMAL
IMM GRANULOCYTES # BLD AUTO: 0.13 K/UL (ref 0–0.04)
IMM GRANULOCYTES NFR BLD AUTO: 2.4 % (ref 0–0.5)
LACTATE SERPL-SCNC: 0.8 MMOL/L (ref 0.5–2.2)
LACTATE SERPL-SCNC: 5.7 MMOL/L (ref 0.5–2.2)
LEAD BLD-MCNC: <1 MCG/DL
LYMPHOCYTES # BLD AUTO: 1.1 K/UL (ref 1–4.8)
LYMPHOCYTES NFR BLD: 20.3 % (ref 18–48)
MAGNESIUM SERPL-MCNC: 2 MG/DL (ref 1.6–2.6)
MCH RBC QN AUTO: 21.1 PG (ref 27–31)
MCHC RBC AUTO-ENTMCNC: 29.2 G/DL (ref 32–36)
MCV RBC AUTO: 72 FL (ref 82–98)
MERCURY BLD-MCNC: <1 NG/ML
MODE: ABNORMAL
MONOCYTES # BLD AUTO: 1 K/UL (ref 0.3–1)
MONOCYTES NFR BLD: 17.7 % (ref 4–15)
NEUTROPHILS # BLD AUTO: 3.1 K/UL (ref 1.8–7.7)
NEUTROPHILS NFR BLD: 56.8 % (ref 38–73)
NRBC BLD-RTO: 0 /100 WBC
PCO2 BLDA: 48.8 MMHG (ref 35–45)
PH SMN: 7.21 [PH] (ref 7.35–7.45)
PHOSPHATE SERPL-MCNC: 3.2 MG/DL (ref 2.7–4.5)
PLATELET # BLD AUTO: 321 K/UL (ref 150–450)
PMV BLD AUTO: 10.6 FL (ref 9.2–12.9)
PO2 BLDA: 87 MMHG (ref 80–100)
POC BE: -9 MMOL/L
POC IONIZED CALCIUM: 1.26 MMOL/L (ref 1.06–1.42)
POC SATURATED O2: 94 % (ref 95–100)
POCT GLUCOSE: 167 MG/DL (ref 70–110)
POTASSIUM BLD-SCNC: 3.7 MMOL/L (ref 3.5–5.1)
POTASSIUM SERPL-SCNC: 3.3 MMOL/L (ref 3.5–5.1)
POTASSIUM SERPL-SCNC: 4 MMOL/L (ref 3.5–5.1)
PROCALCITONIN SERPL IA-MCNC: 0.16 NG/ML
PROT SERPL-MCNC: 6.6 G/DL (ref 6–8.4)
PROT SERPL-MCNC: 7.2 G/DL (ref 6–8.4)
RACE: NORMAL
RBC # BLD AUTO: 3.98 M/UL (ref 4–5.4)
SAMPLE: ABNORMAL
SITE: ABNORMAL
SODIUM BLD-SCNC: 144 MMOL/L (ref 136–145)
SODIUM SERPL-SCNC: 144 MMOL/L (ref 136–145)
SODIUM SERPL-SCNC: 146 MMOL/L (ref 136–145)
STATE: NORMAL
STREET ADDRESS: NORMAL
TROPONIN I SERPL DL<=0.01 NG/ML-MCNC: 0.01 NG/ML (ref 0–0.03)
VENOUS/CAPILLARY: NORMAL
VIT B12 SERPL-MCNC: 562 PG/ML (ref 210–950)
WBC # BLD AUTO: 5.41 K/UL (ref 3.9–12.7)
ZIP: NORMAL

## 2023-06-20 PROCEDURE — 83605 ASSAY OF LACTIC ACID: CPT | Performed by: INTERNAL MEDICINE

## 2023-06-20 PROCEDURE — G0406 PR TELHEALTH INPT CONSULT 15MIN: ICD-10-PCS | Mod: 95,,, | Performed by: PSYCHIATRY & NEUROLOGY

## 2023-06-20 PROCEDURE — 82803 BLOOD GASES ANY COMBINATION: CPT

## 2023-06-20 PROCEDURE — 80076 HEPATIC FUNCTION PANEL: CPT | Performed by: NURSE PRACTITIONER

## 2023-06-20 PROCEDURE — 92526 ORAL FUNCTION THERAPY: CPT

## 2023-06-20 PROCEDURE — 63600175 PHARM REV CODE 636 W HCPCS

## 2023-06-20 PROCEDURE — 97535 SELF CARE MNGMENT TRAINING: CPT

## 2023-06-20 PROCEDURE — G0406 INPT/TELE FOLLOW UP 15: HCPCS | Mod: 95,,, | Performed by: PSYCHIATRY & NEUROLOGY

## 2023-06-20 PROCEDURE — 51702 INSERT TEMP BLADDER CATH: CPT

## 2023-06-20 PROCEDURE — 93010 ELECTROCARDIOGRAM REPORT: CPT | Mod: ,,, | Performed by: INTERNAL MEDICINE

## 2023-06-20 PROCEDURE — 84145 PROCALCITONIN (PCT): CPT | Performed by: INTERNAL MEDICINE

## 2023-06-20 PROCEDURE — 63600175 PHARM REV CODE 636 W HCPCS: Performed by: INTERNAL MEDICINE

## 2023-06-20 PROCEDURE — 94761 N-INVAS EAR/PLS OXIMETRY MLT: CPT

## 2023-06-20 PROCEDURE — 95822 EEG COMA OR SLEEP ONLY: CPT | Mod: 26,,, | Performed by: STUDENT IN AN ORGANIZED HEALTH CARE EDUCATION/TRAINING PROGRAM

## 2023-06-20 PROCEDURE — 82550 ASSAY OF CK (CPK): CPT | Performed by: INTERNAL MEDICINE

## 2023-06-20 PROCEDURE — 83735 ASSAY OF MAGNESIUM: CPT | Performed by: NURSE PRACTITIONER

## 2023-06-20 PROCEDURE — 31500 INSERT EMERGENCY AIRWAY: CPT | Mod: ,,, | Performed by: INTERNAL MEDICINE

## 2023-06-20 PROCEDURE — 99291 CRITICAL CARE FIRST HOUR: CPT | Mod: 25,,, | Performed by: INTERNAL MEDICINE

## 2023-06-20 PROCEDURE — 87205 SMEAR GRAM STAIN: CPT | Performed by: INTERNAL MEDICINE

## 2023-06-20 PROCEDURE — 36600 WITHDRAWAL OF ARTERIAL BLOOD: CPT

## 2023-06-20 PROCEDURE — 84132 ASSAY OF SERUM POTASSIUM: CPT

## 2023-06-20 PROCEDURE — 31500 INTUBATION: ICD-10-PCS | Mod: ,,, | Performed by: INTERNAL MEDICINE

## 2023-06-20 PROCEDURE — 80048 BASIC METABOLIC PNL TOTAL CA: CPT | Mod: XB | Performed by: NURSE PRACTITIONER

## 2023-06-20 PROCEDURE — 99900026 HC AIRWAY MAINTENANCE (STAT)

## 2023-06-20 PROCEDURE — 95822 EEG COMA OR SLEEP ONLY: CPT

## 2023-06-20 PROCEDURE — 99291 CRITICAL CARE FIRST HOUR: CPT | Mod: ,,, | Performed by: PSYCHIATRY & NEUROLOGY

## 2023-06-20 PROCEDURE — 93005 ELECTROCARDIOGRAM TRACING: CPT

## 2023-06-20 PROCEDURE — 99291 PR CRITICAL CARE, E/M 30-74 MINUTES: ICD-10-PCS | Mod: 25,,, | Performed by: INTERNAL MEDICINE

## 2023-06-20 PROCEDURE — 99900035 HC TECH TIME PER 15 MIN (STAT)

## 2023-06-20 PROCEDURE — 82330 ASSAY OF CALCIUM: CPT

## 2023-06-20 PROCEDURE — 93010 EKG 12-LEAD: ICD-10-PCS | Mod: ,,, | Performed by: INTERNAL MEDICINE

## 2023-06-20 PROCEDURE — 87070 CULTURE OTHR SPECIMN AEROBIC: CPT | Performed by: INTERNAL MEDICINE

## 2023-06-20 PROCEDURE — 92507 TX SP LANG VOICE COMM INDIV: CPT

## 2023-06-20 PROCEDURE — 95822 PR EEG,COMA/SLEEP RECORD ONLY: ICD-10-PCS | Mod: 26,,, | Performed by: STUDENT IN AN ORGANIZED HEALTH CARE EDUCATION/TRAINING PROGRAM

## 2023-06-20 PROCEDURE — 87040 BLOOD CULTURE FOR BACTERIA: CPT | Performed by: INTERNAL MEDICINE

## 2023-06-20 PROCEDURE — 80053 COMPREHEN METABOLIC PANEL: CPT | Performed by: INTERNAL MEDICINE

## 2023-06-20 PROCEDURE — 25000003 PHARM REV CODE 250: Performed by: STUDENT IN AN ORGANIZED HEALTH CARE EDUCATION/TRAINING PROGRAM

## 2023-06-20 PROCEDURE — 84100 ASSAY OF PHOSPHORUS: CPT | Performed by: INTERNAL MEDICINE

## 2023-06-20 PROCEDURE — 85014 HEMATOCRIT: CPT

## 2023-06-20 PROCEDURE — 99291 PR CRITICAL CARE, E/M 30-74 MINUTES: ICD-10-PCS | Mod: ,,, | Performed by: PSYCHIATRY & NEUROLOGY

## 2023-06-20 PROCEDURE — 94002 VENT MGMT INPAT INIT DAY: CPT

## 2023-06-20 PROCEDURE — 84295 ASSAY OF SERUM SODIUM: CPT

## 2023-06-20 PROCEDURE — 84484 ASSAY OF TROPONIN QUANT: CPT | Performed by: INTERNAL MEDICINE

## 2023-06-20 PROCEDURE — 25000003 PHARM REV CODE 250

## 2023-06-20 PROCEDURE — 27000221 HC OXYGEN, UP TO 24 HOURS

## 2023-06-20 PROCEDURE — 84425 ASSAY OF VITAMIN B-1: CPT | Performed by: INTERNAL MEDICINE

## 2023-06-20 PROCEDURE — 63600175 PHARM REV CODE 636 W HCPCS: Performed by: HOSPITALIST

## 2023-06-20 PROCEDURE — 25000003 PHARM REV CODE 250: Performed by: INTERNAL MEDICINE

## 2023-06-20 PROCEDURE — 63600175 PHARM REV CODE 636 W HCPCS: Performed by: NURSE PRACTITIONER

## 2023-06-20 PROCEDURE — 82077 ASSAY SPEC XCP UR&BREATH IA: CPT | Performed by: INTERNAL MEDICINE

## 2023-06-20 PROCEDURE — 85025 COMPLETE CBC W/AUTO DIFF WBC: CPT | Performed by: NURSE PRACTITIONER

## 2023-06-20 RX ORDER — INDOMETHACIN 25 MG/1
CAPSULE ORAL
Status: COMPLETED
Start: 2023-06-20 | End: 2023-06-20

## 2023-06-20 RX ORDER — SODIUM CHLORIDE 9 MG/ML
INJECTION, SOLUTION INTRAVENOUS CONTINUOUS
Status: DISCONTINUED | OUTPATIENT
Start: 2023-06-20 | End: 2023-06-20 | Stop reason: HOSPADM

## 2023-06-20 RX ORDER — ROCURONIUM BROMIDE 10 MG/ML
50 INJECTION, SOLUTION INTRAVENOUS ONCE
Status: COMPLETED | OUTPATIENT
Start: 2023-06-20 | End: 2023-06-20

## 2023-06-20 RX ORDER — QUETIAPINE FUMARATE 100 MG/1
200 TABLET, FILM COATED ORAL NIGHTLY
Status: DISCONTINUED | OUTPATIENT
Start: 2023-06-20 | End: 2023-06-20

## 2023-06-20 RX ORDER — ROCURONIUM BROMIDE 10 MG/ML
INJECTION, SOLUTION INTRAVENOUS
Status: COMPLETED
Start: 2023-06-20 | End: 2023-06-20

## 2023-06-20 RX ORDER — HYDROCHLOROTHIAZIDE 12.5 MG/1
12.5 TABLET ORAL DAILY PRN
Status: DISCONTINUED | OUTPATIENT
Start: 2023-06-20 | End: 2023-06-20

## 2023-06-20 RX ORDER — POTASSIUM CHLORIDE 14.9 MG/ML
20 INJECTION INTRAVENOUS ONCE
Status: DISCONTINUED | OUTPATIENT
Start: 2023-06-20 | End: 2023-06-20

## 2023-06-20 RX ORDER — LEVETIRACETAM 5 MG/ML
500 INJECTION INTRAVASCULAR ONCE
Status: DISCONTINUED | OUTPATIENT
Start: 2023-06-20 | End: 2023-06-20

## 2023-06-20 RX ORDER — QUETIAPINE FUMARATE 100 MG/1
100 TABLET, FILM COATED ORAL DAILY
Status: DISCONTINUED | OUTPATIENT
Start: 2023-06-20 | End: 2023-06-20

## 2023-06-20 RX ORDER — LORAZEPAM 2 MG/ML
4 INJECTION INTRAMUSCULAR
Status: COMPLETED | OUTPATIENT
Start: 2023-06-20 | End: 2023-06-20

## 2023-06-20 RX ORDER — CHLORHEXIDINE GLUCONATE ORAL RINSE 1.2 MG/ML
15 SOLUTION DENTAL 2 TIMES DAILY
Status: DISCONTINUED | OUTPATIENT
Start: 2023-06-20 | End: 2023-06-20 | Stop reason: HOSPADM

## 2023-06-20 RX ORDER — LEVETIRACETAM 5 MG/ML
500 INJECTION INTRAVASCULAR EVERY 12 HOURS
Status: DISCONTINUED | OUTPATIENT
Start: 2023-06-20 | End: 2023-06-20 | Stop reason: HOSPADM

## 2023-06-20 RX ORDER — PROPOFOL 10 MG/ML
100 VIAL (ML) INTRAVENOUS ONCE
Status: COMPLETED | OUTPATIENT
Start: 2023-06-20 | End: 2023-06-20

## 2023-06-20 RX ORDER — FOLIC ACID 1 MG/1
1 TABLET ORAL DAILY
Status: DISCONTINUED | OUTPATIENT
Start: 2023-06-20 | End: 2023-06-20 | Stop reason: HOSPADM

## 2023-06-20 RX ORDER — ROCURONIUM BROMIDE 10 MG/ML
INJECTION, SOLUTION INTRAVENOUS
Status: DISCONTINUED
Start: 2023-06-20 | End: 2023-06-20 | Stop reason: HOSPADM

## 2023-06-20 RX ORDER — INDOMETHACIN 25 MG/1
100 CAPSULE ORAL ONCE
Status: COMPLETED | OUTPATIENT
Start: 2023-06-20 | End: 2023-06-20

## 2023-06-20 RX ORDER — LEVETIRACETAM 10 MG/ML
1000 INJECTION INTRAVASCULAR EVERY 12 HOURS
Status: DISCONTINUED | OUTPATIENT
Start: 2023-06-20 | End: 2023-06-20

## 2023-06-20 RX ORDER — LORAZEPAM 2 MG/ML
INJECTION INTRAMUSCULAR
Status: COMPLETED
Start: 2023-06-20 | End: 2023-06-20

## 2023-06-20 RX ORDER — PROPOFOL 10 MG/ML
0-50 INJECTION, EMULSION INTRAVENOUS CONTINUOUS
Status: DISCONTINUED | OUTPATIENT
Start: 2023-06-20 | End: 2023-06-20 | Stop reason: HOSPADM

## 2023-06-20 RX ORDER — HYDRALAZINE HYDROCHLORIDE 20 MG/ML
10 INJECTION INTRAMUSCULAR; INTRAVENOUS EVERY 6 HOURS PRN
Status: DISCONTINUED | OUTPATIENT
Start: 2023-06-20 | End: 2023-06-20 | Stop reason: HOSPADM

## 2023-06-20 RX ORDER — METOPROLOL TARTRATE 50 MG/1
50 TABLET ORAL 2 TIMES DAILY
Status: DISCONTINUED | OUTPATIENT
Start: 2023-06-20 | End: 2023-06-20 | Stop reason: HOSPADM

## 2023-06-20 RX ORDER — LEVETIRACETAM 15 MG/ML
1500 INJECTION INTRAVASCULAR EVERY 12 HOURS
Status: DISCONTINUED | OUTPATIENT
Start: 2023-06-20 | End: 2023-06-20 | Stop reason: HOSPADM

## 2023-06-20 RX ORDER — LORAZEPAM 2 MG/ML
0.5 INJECTION INTRAMUSCULAR ONCE
Status: DISCONTINUED | OUTPATIENT
Start: 2023-06-20 | End: 2023-06-20

## 2023-06-20 RX ORDER — HALOPERIDOL 5 MG/ML
5 INJECTION INTRAMUSCULAR ONCE
Status: COMPLETED | OUTPATIENT
Start: 2023-06-20 | End: 2023-06-20

## 2023-06-20 RX ORDER — OLANZAPINE 10 MG/2ML
2.5 INJECTION, POWDER, FOR SOLUTION INTRAMUSCULAR EVERY 8 HOURS PRN
Status: DISCONTINUED | OUTPATIENT
Start: 2023-06-20 | End: 2023-06-20

## 2023-06-20 RX ORDER — LORAZEPAM 2 MG/ML
4 INJECTION INTRAMUSCULAR EVERY 4 HOURS PRN
Status: DISCONTINUED | OUTPATIENT
Start: 2023-06-20 | End: 2023-06-20 | Stop reason: HOSPADM

## 2023-06-20 RX ORDER — PROPOFOL 10 MG/ML
INJECTION, EMULSION INTRAVENOUS
Status: COMPLETED
Start: 2023-06-20 | End: 2023-06-20

## 2023-06-20 RX ORDER — HYDROCHLOROTHIAZIDE 12.5 MG/1
12.5 TABLET ORAL DAILY
Status: DISCONTINUED | OUTPATIENT
Start: 2023-06-20 | End: 2023-06-20

## 2023-06-20 RX ORDER — LEVETIRACETAM 10 MG/ML
1000 INJECTION INTRAVASCULAR ONCE
Status: COMPLETED | OUTPATIENT
Start: 2023-06-20 | End: 2023-06-20

## 2023-06-20 RX ADMIN — LEVETIRACETAM INJECTION 500 MG: 5 INJECTION INTRAVENOUS at 11:06

## 2023-06-20 RX ADMIN — INDOMETHACIN 100 MEQ: 25 CAPSULE ORAL at 10:06

## 2023-06-20 RX ADMIN — PROPOFOL 5 MCG/KG/MIN: 10 INJECTION, EMULSION INTRAVENOUS at 01:06

## 2023-06-20 RX ADMIN — SODIUM CHLORIDE: 9 INJECTION, SOLUTION INTRAVENOUS at 11:06

## 2023-06-20 RX ADMIN — SODIUM BICARBONATE: 84 INJECTION, SOLUTION INTRAVENOUS at 11:06

## 2023-06-20 RX ADMIN — LORAZEPAM 0.5 MG: 2 INJECTION INTRAMUSCULAR at 10:06

## 2023-06-20 RX ADMIN — HYDRALAZINE HYDROCHLORIDE 10 MG: 20 INJECTION, SOLUTION INTRAMUSCULAR; INTRAVENOUS at 02:06

## 2023-06-20 RX ADMIN — ALTEPLASE 2 MG: 2.2 INJECTION, POWDER, LYOPHILIZED, FOR SOLUTION INTRAVENOUS at 02:06

## 2023-06-20 RX ADMIN — LORAZEPAM 0.5 MG: 2 INJECTION INTRAMUSCULAR; INTRAVENOUS at 10:06

## 2023-06-20 RX ADMIN — SODIUM BICARBONATE: 84 INJECTION, SOLUTION INTRAVENOUS at 07:06

## 2023-06-20 RX ADMIN — ENOXAPARIN SODIUM 40 MG: 40 INJECTION SUBCUTANEOUS at 05:06

## 2023-06-20 RX ADMIN — ROCURONIUM BROMIDE 50 MG: 10 INJECTION, SOLUTION INTRAVENOUS at 01:06

## 2023-06-20 RX ADMIN — ROCURONIUM BROMIDE 50 MG: 10 INJECTION INTRAVENOUS at 01:06

## 2023-06-20 RX ADMIN — THIAMINE HYDROCHLORIDE 100 MG: 100 INJECTION, SOLUTION INTRAMUSCULAR; INTRAVENOUS at 09:06

## 2023-06-20 RX ADMIN — CEFTRIAXONE 2 G: 2 INJECTION, POWDER, FOR SOLUTION INTRAMUSCULAR; INTRAVENOUS at 05:06

## 2023-06-20 RX ADMIN — PROPOFOL 100 MG: 10 INJECTION, EMULSION INTRAVENOUS at 01:06

## 2023-06-20 RX ADMIN — PROPOFOL 20 MCG/KG/MIN: 10 INJECTION, EMULSION INTRAVENOUS at 08:06

## 2023-06-20 RX ADMIN — LABETALOL HYDROCHLORIDE 10 MG: 5 INJECTION INTRAVENOUS at 12:06

## 2023-06-20 RX ADMIN — SODIUM BICARBONATE 100 MEQ: 84 INJECTION, SOLUTION INTRAVENOUS at 10:06

## 2023-06-20 RX ADMIN — Medication 100 MG: at 01:06

## 2023-06-20 RX ADMIN — SODIUM CHLORIDE, POTASSIUM CHLORIDE, SODIUM LACTATE AND CALCIUM CHLORIDE 1000 ML: 600; 310; 30; 20 INJECTION, SOLUTION INTRAVENOUS at 12:06

## 2023-06-20 RX ADMIN — HYDRALAZINE HYDROCHLORIDE 10 MG: 20 INJECTION, SOLUTION INTRAMUSCULAR; INTRAVENOUS at 05:06

## 2023-06-20 RX ADMIN — MIDAZOLAM 0.5 MG/HR: 5 INJECTION INTRAMUSCULAR; INTRAVENOUS at 04:06

## 2023-06-20 RX ADMIN — LEVETIRACETAM INJECTION 1000 MG: 10 INJECTION INTRAVENOUS at 01:06

## 2023-06-20 RX ADMIN — HALOPERIDOL LACTATE 5 MG: 5 INJECTION, SOLUTION INTRAMUSCULAR at 01:06

## 2023-06-20 NOTE — PLAN OF CARE
Pt had multiple seizures upon arrival to ICU. Ended up intubated with an 8.0, 21@teeth. ST on monitor, 120s. Tmax 100.8. OG in place. Little intact. Pt turned/repositioned with pillows and wedge. Heel boots in place. Bed low, wheels locked, alarms audible. POC reviewed with family. CT head & EEG done this shift.

## 2023-06-20 NOTE — ASSESSMENT & PLAN NOTE
Large right upper lobe opacity favoring pneumonia  Empirically started on vancomycin, cefepime and azithromycin initially while in ICU.   Endotracheal aspirate sent for culture, final culture pending, no staph or pseudomonas isolated.   Transitioned to ceftriaxone and azithromycin (Day 8 of abx)   Currently stable on room air

## 2023-06-20 NOTE — ASSESSMENT & PLAN NOTE
Severe and out of proportion to lactate and ketones.  Salicylates negative.  Concern for toxic alcohols.  Particularly methanol given reported vision changes.   Concurrent osmolal gap noted on 6/14.  Renal consulted and started on HD.    HAGMA now much improved.  6/20 treated with dialysis and fomepizole for methanol toxicity.  Repeat BNP.  Bicarb drip.

## 2023-06-20 NOTE — ASSESSMENT & PLAN NOTE
30 seconds of generalized convulsion on telemetry floor.  Seizure precaution ICU monitoring IV Keppra monitor and replete lytes.  Start bicarb drip.  EEG.

## 2023-06-20 NOTE — SUBJECTIVE & OBJECTIVE
Interval History: See Hospital Course     Review of Systems  Objective:     Vital Signs (Most Recent):  Temp: 98.4 °F (36.9 °C) (06/20/23 0912)  Pulse: 104 (06/20/23 0912)  Resp: 18 (06/20/23 0912)  BP: (!) 168/103 (06/20/23 0912)  SpO2: (!) 92 % (06/20/23 0912) Vital Signs (24h Range):  Temp:  [97.8 °F (36.6 °C)-98.9 °F (37.2 °C)] 98.4 °F (36.9 °C)  Pulse:  [104-112] 104  Resp:  [18-20] 18  SpO2:  [92 %-98 %] 92 %  BP: (168-193)/() 168/103     Weight: 69.9 kg (154 lb 1.6 oz)  Body mass index is 29.12 kg/m².    Intake/Output Summary (Last 24 hours) at 6/20/2023 1008  Last data filed at 6/20/2023 0641  Gross per 24 hour   Intake --   Output 2301 ml   Net -2301 ml         Physical Exam  Vitals and nursing note reviewed.   Constitutional:       Appearance: She is ill-appearing.      Comments: Not verbalizing or following commands    Cardiovascular:      Rate and Rhythm: Normal rate and regular rhythm.      Heart sounds: No murmur heard.    No friction rub. No gallop.   Pulmonary:      Effort: Pulmonary effort is normal.      Breath sounds: No wheezing, rhonchi or rales.      Comments: On room air   Abdominal:      General: Bowel sounds are normal. There is no distension.      Palpations: Abdomen is soft.      Tenderness: There is no abdominal tenderness.   Musculoskeletal:      Right lower leg: No edema.      Left lower leg: No edema.   Neurological:      Comments: Does not verbalize or follow commands, twitching of BUE and BLE, PERRL, + gag reflex, + cough. + R gaze preference/R head turning             Significant Labs: All pertinent labs within the past 24 hours have been reviewed.    Significant Imaging: I have reviewed all pertinent imaging results/findings within the past 24 hours.

## 2023-06-20 NOTE — ASSESSMENT & PLAN NOTE
Negative CT Head and MRI in ED  Suspect related to sepsis and or toxicologic etiologies  Broad spectrum abx and supportive care  6/20 metabolic and respiratory acidosis.  Start bicarb drip.  CT head/MRI when stable.

## 2023-06-20 NOTE — PT/OT/SLP PROGRESS
"Occupational Therapy      Patient Name:  Shania Tapia   MRN:  [id number]    08:10 a.m.  Per nurse, Ozzy, request OT to hold treatment for a.m. due to patient needing to rest. She stated patient "had a bad night" and has not slept in several days.         10:28 a.m.  Rapid response called by nursing staff resulting in T/F to ICU.     Will follow up once medically appropriate.    Magdalena LEE, LOTR    6/20/2023  "

## 2023-06-20 NOTE — PT/OT/SLP PROGRESS
"Speech Language Pathology Treatment    Patient Name:  Shania Tapia   MRN:  [id number]  Admitting Diagnosis: Methanol poisoning    Recommendations:                 General Recommendations:  Dysphagia therapy and Cognitive-linguistic therapy  Diet recommendations:  Regular Diet - IDDSI Level 7, Liquid Diet Level: Thin liquids - IDDSI Level 0   Aspiration Precautions: Assistance with meals, Feed only when awake/alert, Frequent oral care, HOB to 90 degrees, Remain upright 30 minutes post meal, Small bites/sips, and Standard aspiration precautions   General Precautions: Standard, aspiration  Communication strategies:  yes/no questions only and provide increased time to answer; severe cognitive-linguistic impairment    Subjective     Pt seen bedside for ST.  Mother present. No c/o pain.  Pt verbal with confabulated speech and obvious visual hallucinations, often speaking to "people" in room.      Patient/family goals: to improve medical status    Pain/Comfort:  0/10    Respiratory Status: Room air    Objective:     Has the patient been evaluated by SLP for swallowing?   Yes  Keep patient NPO? No   Current Respiratory Status: room air    Tx feeds of thin liquids, pureed and regular solids provided for diet advancement.  No overt s/s of dysphagia present. Improved oral efficiency noted.  Pt will need assist with meals s/t UE tremors with impaired coordination.    Pt is verbal but unable to engage in conversation.  Confabulated speech.  Disorganized thought process, verbal output and attention to tasks requiring frequent redirection. Hallucinating throughout and talking to relatives that are not present in the room.    Goals:   Multidisciplinary Problems       SLP Goals          Problem: SLP    Goal Priority Disciplines Outcome   SLP Goal     SLP Ongoing, Progressing   Description: Patient will tolerate bedside po trials for diet upgrade  TPW complete further cognitive-linguistic testing to assess current level of " functioning                       Assessment:     Shania Tapia is a 37 y.o. female with an SLP diagnosis of Dysphagia and Cognitive-Linguistic Impairment in the setting of Methanol poisoning with subsequent severe sepsis, respiratory failure requiring intubation on admission, encephalopathy and hallucinations.  She presents with improved oral efficiency and recommended for diet advancement to IDDSI 7-regular solids with IDDSI 0-thin liquids, following aspiration precautions/meal assist.  Pt recommended for diet f/u with continued communicative tx as clinically indicated, pending symptom improvement with medical management.    Plan:     Patient to be seen:  2 x/week, 3 x/week   Plan of Care expires:  06/26/23  Plan of Care reviewed with:  patient, mother   SLP Follow-Up:  Yes       Discharge recommendations:  rehabilitation facility   Barriers to Discharge:  None    Time Tracking:     SLP Treatment Date:   06/19/23  Speech Start Time:  1000  Speech Stop Time:  1030     Speech Total Time (min):  30 min    Billable Minutes: Speech Therapy Individual 15 minutes and Treatment Swallowing Dysfunction 15 minutes    06/19/2023

## 2023-06-20 NOTE — PROGRESS NOTES
"[facility] Medicine  Progress Note    Patient Name: Shania Tapia  MRN: [id number]  Patient Class: IP- Inpatient   Admission Date: 6/13/2023  Length of Stay: 6 days  Attending Physician: Ruthann Burris MD  Primary Care Provider: Flavia Fink DO        Subjective:     Principal Problem:Methanol poisoning        HPI:  Shania Tapia, a 37-year-old female with a history of Anxiety and Hypertension, was admitted to the ICU on 6/13/2023 following an emergency department presentation for blurry vision, altered color vision, word-finding difficulties, unsteady gait, and jitteriness, which she initially associated with her anxiety. Subsequently, she experienced suspected seizure activity and required emergent intubation for airway protection.    Initial workup demonstrated severe high anion gap metabolic acidosis and a dense right upper lobe pneumonia. Suspecting possible toxic alcohol poisoning, specifically methanol (given her vision changes), she was started on fomepizole (last dose on 06/16) and bicarbonate infusion. Renal team initiated emergency hemodialysis (06/14) , and empiric antibiotic therapy was initiated for pneumonia.    After acidosis resolution, bicarbonate infusion was discontinued, and she was successfully extubated on 6/15. 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     Psychiatric consultation deemed her not to meet the criteria for a PEC, although escitalopram was suggested to manage her anxiety. Collateral information from her  did not suggest any suicidal ideation or attempts. He noted, however, significant job-related stress and anxiety in recent months, including stress-induced tremors. She had used various over-the-counter supplements and borrowed prescription anti-anxiety medications from her family members, " albeit in a non-seeking manner, for self-management.  The patient was deemed stable for transfer out of the ICU on 6/16/2023.      Overview/Hospital Course:  Since stepdown, Labs returned on 6/16/2023 in the evening confirming presence of Methanol. Send out Volatile labs returned POSITIVE on 6/17/2023 for likely Methanol. Minimal change in mentation since being on the floor, pt has had difficulty sleeping, agitation, auditory and visual hallucinations. . Seroquel ordered for symptoms. Psych consulted for management.     PT/OT recommend rehab placement at this time  SLP recommend Soft & Bite Sized Diet - IDDSI Level 6, Thin liquids - IDDSI Level 0    06/19: Methanol level from 06/14 resulted at 123. Pt oriented x person, place, time but continues to have visual and auditory hallucinations. Pt seen with RN and mother at bedside. Per pt mother, pt has been having progressive tremors, anxiety and confusion since may but initially symptoms were attributed to work related anxiety. Pt mother reports no known ingestions or known self harm issues but reports that pt may have exposures at work as she works as a . Psychiatry reconsulted. [place] Poison Control/Toxicology notified re: case.     06/20: Per RN and mother at bedside, pt remained agitated and hallucinating overnight, did not sleep at all. Was evaluated by psychiatry who increased PM dose of seroquel. Was given IM Haldol 5 mg x 1 per night team for agitations. This morning, pt is not verbalizing on exam, continues to have twitching more pronounced in lower extremities, is not following commands. Will obtain stat CT head, EEG to eval seizures and consult neurology to eval. Hold all sedating meds including seroquel.       Interval History: See Hospital Course     Review of Systems  Objective:     Vital Signs (Most Recent):  Temp: 98.4 °F (36.9 °C) (06/20/23 0912)  Pulse: 104 (06/20/23 0912)  Resp: 18 (06/20/23 0912)  BP: (!) 168/103 (06/20/23  0912)  SpO2: (!) 92 % (06/20/23 0912) Vital Signs (24h Range):  Temp:  [97.8 °F (36.6 °C)-98.9 °F (37.2 °C)] 98.4 °F (36.9 °C)  Pulse:  [104-112] 104  Resp:  [18-20] 18  SpO2:  [92 %-98 %] 92 %  BP: (168-193)/() 168/103     Weight: 69.9 kg (154 lb 1.6 oz)  Body mass index is 29.12 kg/m².    Intake/Output Summary (Last 24 hours) at 6/20/2023 1008  Last data filed at 6/20/2023 0641  Gross per 24 hour   Intake --   Output 2301 ml   Net -2301 ml         Physical Exam  Vitals and nursing note reviewed.   Constitutional:       Appearance: She is ill-appearing.      Comments: Not verbalizing or following commands    Cardiovascular:      Rate and Rhythm: Normal rate and regular rhythm.      Heart sounds: No murmur heard.    No friction rub. No gallop.   Pulmonary:      Effort: Pulmonary effort is normal.      Breath sounds: No wheezing, rhonchi or rales.      Comments: On room air   Abdominal:      General: Bowel sounds are normal. There is no distension.      Palpations: Abdomen is soft.      Tenderness: There is no abdominal tenderness.   Musculoskeletal:      Right lower leg: No edema.      Left lower leg: No edema.   Neurological:      Comments: Does not verbalize or follow commands, twitching of BUE and BLE, PERRL, + gag reflex, + cough. + R gaze preference/R head turning             Significant Labs: All pertinent labs within the past 24 hours have been reviewed.    Significant Imaging: I have reviewed all pertinent imaging results/findings within the past 24 hours.      Assessment/Plan:      * Methanol poisoning  - Likely contributing to encephalopathy as above   - Methanol level 123 on 06/14/2023  - Nephrology consulted; s/p HD on 06/14  - Received Fomepizole (Last dose on 06/16/2023)   - Discussed with on call  with [place] Poison Control- given methanol level on admission, most likely acute exposure/ingestion rather than cumulative exposure.   - Family denies knowledge of intentional ingestion  or exposures in the home. ? Occupational exposure vs. Intentional ingestion         Encephalopathy  CT head and MRI brain with no acute findings   Likely toxic metabolic encephalopathy in setting of methanol toxicity. Differential also includes exacerbation of underlying psychiatric condition vs. Ativan withdrawal (pt reportedly was taking anxiety meds belonging to  and mother, UDS neg on admission for benzos) vs. Wernicke encephalopathy   - TSH wnl on admission. RPR nonreactive, B12 and ammonia wnl  - start folate supplementation  - psychiatry reconsulted 06/19 to eval persistent hallucinations and agitation;recommend seroquel qhs for agitation and melatonin PRN   - pt with decline in mental status today, discussed with neurology, will hold antipsychotic medications for now, obtain stat CT head, MRI and EEG to further eval. May be methanol toxicity vs. Effect of antipsychotics vs. Seizure like activity. Low suspicion for acute CVA   - if AMS persists, consider LP to r/o meningitis/encephalitis   - PT/OT rec rehab placement but at this time, given ongoing hallucinations and agitation, unsure if pt would be able to meaningfully participate with rehab         Metabolic acidosis  Initially presented with high anion gap metabolic acidosis and high osmolar gap with CO2 < 5, pH 7.0 Severe and out of proportion to lactate and ketones.  Salicylates negative.Concern for toxic alcohol ingestion, Methanol level +, 123   Nephrology consulted, patient had emergency HD done 06/14 in addition to initiation bicarbonate infusion   Trialysis catheter removed on 6/16/23  Acidosis improved   Nephrology consulted and following; appreciate recs   Monitor BMP     Hypophosphatemia  Replete as indicated   Monitor    Hypomagnesemia  Replete as indicated   Monitor    Hypokalemia  Improving   Repeat EKG with   Monitor BMP; replete as needed     Anxiety  Patient admits to ongoing increased anxiety over the past several days prior  to ED presentation, however  reports that this has been ongoing for at least a couple months she had had patient has attempted to try over-the-counter meds such as Ashwaganda and that she has tried some anxiety meds that have been prescribed to her mother and  with their knowledge       Hallucinations  Per report, pt suffers from anxiety, reports to a lot of stress and anxiety associated with her job in addition to unintentional 10-15 lb weight loss over the last couple of months  Tele psych consulted on  06/16 and 06/17, pt did not meet criterial for PEC  Per pt mother, no prev hx of self- harm   Pt has worsening hallucinations and agitation, required Haldol and Ativan x 1 on 06/18. Per family, pt has not been sleeping at all.   Seroquel started on 06/18  Psych reconsulted 06/19- recommend increase in Seroquel dose, avoidance of benadryl. Discussed with Dr. Estevez- he recommends using seroquel if needed for agitation       Severe sepsis  This patient does have evidence of infective focus  My overall impression is sepsis.  Source: Respiratory  Antibiotics given-   Antibiotics (72h ago, onward)    Start     Stop Route Frequency Ordered    06/15/23 1700  cefTRIAXone (ROCEPHIN) 1 g in dextrose 5 % in water (D5W) 5 % 100 mL IVPB (MB+)         06/21 1659 IV Every 24 hours (non-standard times) 06/15/23 1416        Latest lactate reviewed-  No results for input(s): LACTATE in the last 72 hours.  Organ dysfunction indicated by Encephalopathy  Will Not start Pressors  Source control achieved by: IV antibiotics    Acute respiratory failure with hypoxia  Patient required emergent intubation for airway protection in ED, successfully extubated on 06/15/2023 and transitioned to nasal cannula, weaned off supplemental O2. Currently stable on room air   Continue treatment for pneumonia    Pneumonia of right upper lobe due to infectious organism  Large right upper lobe opacity favoring pneumonia  Empirically started  on vancomycin, cefepime and azithromycin initially while in ICU.   Endotracheal aspirate sent for culture, final culture pending, no staph or pseudomonas isolated.   Transitioned to ceftriaxone and azithromycin (Day 8 of abx)   Currently stable on room air        Essential hypertension  - continue home Nifedipine   - add norvasc   - PRN IV Hydralazine, labetalol   - if remains NPO, may need to schedule IVP lopressor pending BP trends   - monitor BP         VTE Risk Mitigation (From admission, onward)         Ordered     enoxaparin injection 40 mg  Daily         06/14/23 0122                Discharge Planning   MANDEEP:      Code Status: Full Code   Is the patient medically ready for discharge?: No    Reason for patient still in hospital (select all that apply): Patient trending condition, Treatment, Imaging and Consult recommendations  Discharge Plan A: Rehab   Discharge Delays: None known at this time              Ruthann Burris MD  Department of Hospital Medicine   Formerly [facility] - Telemetry ([facility])

## 2023-06-20 NOTE — NURSING
R gaze fixation, nonverbal, not following commands, not managing secretions, drooling from the right side now, not retrieving anything by mouth when presented with spoon/straw, twitching all over, no resistance to gravy in lower extremities, mild resistance in upper extremities.  Dr. Burris and Grace, SLP called to bedside.  Suction set up.  Head of bed maintained >30  STAT head CT and EEG ordered  Dr. Nieto notified

## 2023-06-20 NOTE — CONSULTS
Consult Note  Neurological ICU      Consult Requested By: Jet Jesus MD  Reason for Consult: Methanol poisoning/seizures/AMS    SUBJECTIVE:     History of Present Illness:  The medical records are reviewed for the history, which indicates the patient had presented to the ER with complaints of blurred vision, unsteady gait, word finding difficulty and jitteriness.  Initial lab demonstrated a very large anion gap (bicarb less than 5) and metabolic acidosis.  She also had right upper lobe pneumonia, and while in the ER, had apparent seizure that was treated with lorazepam IV.  She subsequently desaturated and required intubation and ventilator support for several days.    Subsequently, lab returned with a serum methanol level of 123, consistent with ingestion.  She was aggressively treated for the methanol poisoning, including HD, bicarbonate infusion and fomepizole.    The patient has continued to exhibit anxiety, intermittent visual hallucinations, agitation, and over the last 24 hours, much less verbal, more twitching of extremities, not responding to verbal commands, and then witnessed clonic movements of both upper and lower extremities.  She was given 0.5 mg lorazepam IV and transferred back to ICU.  Emergent EEG and CT scan brain are pending. She had previously received Seroquel 100 mg and Haldol 5 mg the evening before the latest change.    During this admission, she was been seen by psychiatry, in addition to hospital medicine, nephrology, and critical care.    Review of patient's allergies indicates:   Allergen Reactions    Latex Rash    Latex, natural rubber Rash       Past Medical History:   Diagnosis Date    Abnormal Pap smear     Abnormal Pap smear of vagina     Anemia     Anxiety     High serum osmolar gap 6/15/2023    Hypertension     Visual hallucination 6/15/2023     Past Surgical History:   Procedure Laterality Date    CERVICAL BIOPSY      Conization      Family History   Problem Relation Age  of Onset    Diabetes Paternal Grandmother     Hypertension Father     Heart attack Father     Breast cancer Neg Hx     Colon cancer Neg Hx     Ovarian cancer Neg Hx      Social History     Tobacco Use    Smoking status: Never    Smokeless tobacco: Never   Substance Use Topics    Alcohol use: No    Drug use: No        Review of Systems:  Review of systems not obtained due to patient factors Patient is somnolent following the seizure and lorazepam.    OBJECTIVE:     Vital Signs (Most Recent)  Temp: 99.2 °F (37.3 °C) (06/20/23 1052)  Pulse: (!) 119 (06/20/23 1101)  Resp: (!) 30 (06/20/23 1101)  BP: (!) 151/98 (06/20/23 1101)  SpO2: (!) 90 % (06/20/23 1101)    Vital Signs Range (Last 24H):  Temp:  [97.8 °F (36.6 °C)-99.2 °F (37.3 °C)]   Pulse:  [104-131]   Resp:  [18-30]   BP: (140-193)/()   SpO2:  [90 %-98 %]   Ventilator Data (Last 24H):              Hemodynamic Parameters (Last 24H):       Physical Exam:  General: appears acutely ill, sedated  Head: normocephalic, atraumatic  Eyes:  pupils responsive  Lungs:  clear to auscultation bilaterally  Heart: regular rate and rhythm  Neurologic: Sedated  Mental status: alertness: obtunded  Cranial nerves: II: pupils direct pupil reaction to light bilaterally, III,IV,VI: extraocular muscles eyes conjugate, but does not follow visual target, V,VII: corneal reflex present bilaterally  Motor:Flaccid tone, both wrists, elbows, and knees.  No movement to command at this time  Reflexes: Stretch reflexes are hypoactive generally.  Plantar stimulation is equivocal bilaterallly  Coordination: No involuntary movements seen    Lines/Drains:  PICC Double Lumen 06/16/23 1307 left basilic (Active)   $ PICC Line Charges (Upon insertion) Bedside Insertion Performed Pt > 5 Years Old (no subq port/pump or image guidance);Catheter - Double Lumen (Supply) 06/16/23 1307   Line Necessity Review Poor venous access 06/19/23 0859   Verification by X-ray Yes 06/17/23 2000   Site Assessment No  "drainage;No redness;No swelling;No warmth 06/20/23 0400   Extremity Assessment Distal to IV No abnormal discoloration 06/20/23 0400   Line Securement Device Secured with sutureless device 06/19/23 1930   Dressing Type CHG impregnated dressing/sponge 06/20/23 0400   Dressing Status Clean;Intact;Dry 06/20/23 0400   Dressing Intervention Integrity maintained 06/20/23 0400   Date on Dressing 06/16/23 06/19/23 0859   Dressing Due to be Changed 06/23/23 06/20/23 0400   Distal Patency/Care flushed w/o difficulty 06/20/23 0400   Proximal 1 Patency/Care flushed w/o difficulty 06/20/23 0400   Current Insertion Depth (cm) 40 cm 06/16/23 1307   Current Exposed Catheter (cm) 0 cm 06/16/23 1307   Extremity Circumference (cm) 30 cm 06/16/23 1307   Number of days: 3            Urethral Catheter 06/18/23 0244 (Active)   $ Little Insertion Bedside Insertion Performed 06/18/23 0245   Site Assessment Clean;Intact;Dry 06/20/23 0400   Collection Container Standard drainage bag 06/20/23 0400   Securement Method secured to top of thigh w/ adhesive device 06/20/23 0400   Catheter Care Performed yes 06/20/23 0400   Reason for Continuing Urinary Catheterization Urinary retention 06/20/23 0400   CAUTI Prevention Bundle Securement Device in place with 1" slack;Intact seal between catheter & drainage tubing;Drainage bag/urimeter off the floor;Sheeting clip in use;No dependent loops or kinks;Drainage bag/urimeter not overfilled (<2/3 full);Drainage bag/urimeter below bladder 06/19/23 1930   Output (mL) 500 mL 06/20/23 0641   Number of days: 2       Laboratory:  I have reviewed all pertinent lab results within the past 24 hours.  CBC:   Recent Labs   Lab 06/20/23  0523 06/20/23  1039   WBC 5.41  --    RBC 3.98*  --    HGB 8.4*  --    HCT 28.8* 33*     --    MCV 72*  --    MCH 21.1*  --    MCHC 29.2*  --      CMP:   Recent Labs   Lab 06/20/23  0523   GLU 90   CALCIUM 9.0   ALBUMIN 2.9*   PROT 6.6   *   K 3.3*   CO2 24      BUN 11 "   CREATININE 0.8   ALKPHOS 85   ALT 20   AST 32   BILITOT 0.4     ABGs:   Recent Labs   Lab 06/20/23  1039   PH 7.205*   PCO2 48.8*   PO2 87   HCO3 19.3*   POCSATURATED 94*   BE -9       Chest X-Ray:  CXR pending    Diagnostic Results:  CT scan brain and EEG pending    ASSESSMENT/PLAN:      Methanol ingestion with severe cerebral injury    Plan:  When cooperative, MRI brain would be helpful to assess basal ganglia.  EEG also useful at this time.  Start Keppra 500 mg BID.  Be careful with antipsychotic meds    Critical Care Time greater than: 1 Hour 45 Minutes

## 2023-06-20 NOTE — TREATMENT PLAN
Patient becoming increasingly agitated/anxious in addition to endorsing visual and auditory hallucinations despite receiving 100 mg Seroquel earlier tonight.  5 mg Haldol administered. Will closely monitor and re-evaluate.

## 2023-06-20 NOTE — PROCEDURES
ELECTROENCEPHALOGRAM REPORT    DATE OF SERVICE: 6/20/23  EEG NUMBER: [id number]  REQUESTED BY: Jet Jesus MD  LOCATION OF SERVICE:  ICU    METHODOLOGY   Electroencephalographic (EEG) recording is with electrodes placed according to the International 10-20 placement system.  Thirty two (32) channels of digital signal (sampling rate of 512/sec) including T1 and T2 was simultaneously recorded from the scalp and may include  EKG, EMG, and/or eye monitors.  Recording band pass was 0.1 to 512 hz.  Digital video recording of the patient is simultaneously recorded with the EEG.  The patient is instructed report clinical symptoms which may occur during the recording session.  EEG and video recording is stored and archived in digital format. Activation procedures which include photic stimulation, hyperventilation and instructing patients to perform simple task are done in selected patients.    The EEG is displayed on a monitor screen and can be reviewed using different montages.  Computer assisted analysis is employed to detect spike and electrographic seizure activity.   The entire record is submitted for computer analysis.  The entire recording is visually reviewed and the times identified by computer analysis as being spikes or seizures are reviewed again.  Compresses spectral analysis (CSA) is also performed on the activity recorded from each individual channel.  This is displayed as a power display of frequencies from 0 to 30 Hz over time.   The CSA is reviewed looking for asymmetries in power between homologous areas of the scalp and then compared with the original EEG recording.     [employer] software was also utilized in the review of this study.  This software suite analyzes the EEG recording in multiple domains.  Coherence and rhythmicity is computed to identify EEG sections which may contain organized seizures.  Each channel undergoes analysis to detect presence of spike and sharp waves which have special and  morphological characteristic of epileptic activity.  The routine EEG recording is converted from spacial into frequency domain.  This is then displayed comparing homologous areas to identify areas of significant asymmetry.  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     State of Consciousness:   Stupor    Background:   The background consists of a mixture of theta and delta activity with poor organization and no distinct posterior dominant rhythm.  There are lateralized periodic discharges (LPDs) in the left hemisphere, at times maximum in the left posterior region (T5/P3) but can also shift more frontally (F7).  No discrete electrographic seizures are seen.            Sleep:   No sleep architecture seen.    EKG:   Regular rate and rhythm on single lead EKG    Activating procedures:   - Not performed    Events:   - Per tech annotation and video review, the patient frequently has myoclonic jerking of bilateral lower extremities, as well as occasional head jerking.  This jerking is not clearly linked temporally to the discharges.        Impression:   Abnormal vEEG indicative of a highly epileptogenic focus in the left hemisphere.  No discrete electrographic seizure are observed, but there are continuous left hemispheric periodic discharges placing the patient at risk for seizures arising from this region.  Patient has frequent myoclonic jerking of the lower extremities/head which involve both sides of the body and are not clearly time locked to the LPDs.  A subcortical myoclonus is favored to be the etiology given the reported history of methanol ingestion and concern for methanol related cerebral injury.  There is also evidence of a moderate to severe diffuse encephalopathy of nonspecific  etiology in this record.       Damaris Haile MD  [facility]   Department of Neurology

## 2023-06-20 NOTE — CONSULTS
"      TELEPSYCHIATRY: SUBSEQUENT EVALUATION     ASSESSMENT AND PLAN:     DIAGNOSES & PROBLEMS:  Delirium  Methanol poisoning  Anxiety    In Summary:  - I discussed this case with Dr. Burris this morning.  Patient had received seroquel 100mg bedtime last night with little effect - she remained agitated and hallucinating and did not sleep at all.  We discussed tittrating seroquel dose this AM but prior to this going into effect, patient medically decompensated requiring intubation.  Neurology consulted, seizure activity noted (relayed to me by ICU nurse).    Plan:  - At this time, would recommend holding neuroleptics, as they can lower seizure threshold.  I see seroquel 200mg bedtime is still ordered for tonight - I reached out to Dr. Jesus and he will discontinue the order.      PRESENTATION:     Shania Tapia is a 37 y.o. patient seen for a follow up psychiatric evaluation.  An interval history of the presenting illness (HPI) was obtained, and a pertinent psychiatric and medical review of systems was performed.    Per Patient:  - intubated - unable to communicate - not following commands    Collateral:   Per Nurse:  Unresponsive - not following commands or squeezing hand when asked  Seizure activity noted    REVIEW OF SYSTEMS:  I[x]I Patient unable or unwilling to provide any ROS.    CURRENT PSYCHOTROPIC REGIMEN:  I[x]I Y  I[]I N  I[]I U    Seroquel 200mg bedtime - to be discontinued      PERTINENT PAST HISTORY:     The patient's past psychiatric, family, social and medical history have been reviewed and updated as appropriate within the electronic medical record system.     The electronic chart was reviewed and updated as appropriate.  See Medcard for details.      Additional Relevant History, As Applicable:       EXAMINATION:     BP (!) 144/83   Pulse (!) 125   Temp (!) 100.6 °F (38.1 °C)   Resp 18   Ht 5' 1" (1.549 m)   Wt 69.9 kg (154 lb 1.6 oz)   LMP  (LMP Unknown) Comment: last period less " than 30 days ago per   SpO2 100%   Breastfeeding No   BMI 29.12 kg/m²     MENTAL STATUS EXAMINATION:  General Appearance & Behavior: intubated, in hospital garb  Involuntary Movements and Motor Activity: none noted  Speech & Language: unable to speak  Mood: unable to assess  Affect: unable to assess  Thought Process & Associations: unable to assess  Thought Content & Perceptions: unable to assess  Sensorium and Cognition: intubated  Insight & Judgment: unable to assess       Mono Estevez MD  Department of Psychiatry, [facility] Board Certified, Psychiatry and Addiction Medicine      MANAGEMENT:     I[]I Y = Yes / Present / Endorses.  I[]I N = No / Absent / Denies.  I[]I U = Unknown / Unable to Assess / Unwilling to Participate.  I[]I A = Ambiguity Exists / Accuracy Uncertain.  I[]I D = Denial or Minimization is Suspected/Evident.  I[]I N/A = Non-Applicable.    Chart Review: Available documentation has been reviewed, and pertinent elements of the chart have been incorporated into this evaluation where appropriate.  Last [facility] encounter with me: Visit date not found.    [x] In cases of emergencies (e.g. SI/HI resulting in danger to self or others, functioning deteriorates to the level of grave disability), call 911 or [phone], or present to the emergency department for immediate assistance.  [x] Patient should not operate a motor vehicle or heavy machinery if effects of medications or underlying symptoms/condition impair the ability to safely do so.  [x] Comply with ANY/ALL medication fully as prescribed/instructed and report ANY/ALL suspected adverse effects to appropriate health care providers.    Written material has been provided to supplement, augment, and reinforce any discussions and interventions, via the AVS and/or other pre-printed handouts.  Alcohol, Tobacco, and Drug Counseling, as well as applicable resources, has been provided, as warranted.  Shared medical decision making and informed  consent are the hallmark and bedrock of good clinical care, and as such have been employed and obtained, respectively, to the degree possible.  Risk Mitigation Strategies, Harm Reduction Techniques, and Safety Netting are important interventions that can reduce acute and chronic risk, and as such have been employed to the degree possible.  Prescription Drug Management entails the review, recommendation, or consideration without recommendation of medications, and as such was employed during the encounter.  Additional Psychoeducation has been provided, as warranted.           DIAGNOSTIC TESTING:     Blood Counts, Electrolytes & Glucose:   Lab Results   Component Value Date    WBC 5.41 06/20/2023    GRAN 3.1 06/20/2023    GRAN 56.8 06/20/2023    HGB 8.4 (L) 06/20/2023    HCT 33 (L) 06/20/2023    MCV 72 (L) 06/20/2023     06/20/2023     06/20/2023    K 4.0 06/20/2023    CALCIUM 9.1 06/20/2023    PHOS 3.2 06/20/2023    MG 2.0 06/20/2023    CO2 24 06/20/2023    ANIONGAP 16 06/20/2023     (H) 06/20/2023    HGBA1C 5.5 06/14/2023       Renal, Liver, Pancreas, Thyroid, Parathyroid, Prolactin, CPK, Lipids & Vitamin Levels:   Lab Results   Component Value Date    CREATININE 0.9 06/20/2023    BUN 12 06/20/2023    EGFRNORACEVR >60 06/20/2023    AST 34 06/20/2023    ALT 24 06/20/2023    ALKPHOS 97 06/20/2023    BILITOT 0.4 06/20/2023    ALBUMIN 3.1 (L) 06/20/2023    AMMONIA 43 06/19/2023    TSH 1.259 06/13/2023     (H) 06/20/2023    CHOL 201 (H) 10/20/2022    TRIG 76 10/20/2022    LDLCALC 134.8 10/20/2022    HDL 51 10/20/2022    LZCUAPHV74 562 06/19/2023    FOLATE 19.3 06/19/2023       Therapeutic Drug Levels:   No results found for: LITHIUM, VALPROATE, CBMZ, LAMOTRIGINE, CLOZAPINE, NORCLOZAP, CLOZNORCLOZ    Addiction:   Lab Results   Component Value Date    PCDSOBENZOD Negative 06/13/2023    BARBITURATES Negative 06/13/2023    PCDSCOMETHA Negative 06/13/2023    OPIATESCREEN Presumptive Positive (A)  06/13/2023    COCAINEMETAB Negative 06/13/2023    AMPHETAMINES Negative 06/13/2023    MARIJUANATHC Negative 06/13/2023    PCDSOPHENCYN Negative 06/13/2023    ALCOHOLMEDIC <10 06/13/2023       Results for orders placed or performed during the hospital encounter of 06/13/23   EKG 12-lead    Collection Time: 06/20/23 11:38 AM    Narrative    Test Reason : R00.0,    Vent. Rate : 120 BPM     Atrial Rate : 120 BPM     P-R Int : 120 ms          QRS Dur : 088 ms      QT Int : 340 ms       P-R-T Axes : 065 067 049 degrees     QTc Int : 480 ms    Sinus tachycardia  Possible Left atrial enlargement  Borderline Abnormal ECG  When compared with ECG of 19-JUN-2023 13:11,  Questionable change in The axis  T wave inversion no longer evident in Inferior leads    Referred By: [email or web address]   SELF           Confirmed By:        Results for orders placed or performed during the hospital encounter of 06/13/23   CT Head Without Contrast    Narrative    EXAMINATION:  CT HEAD WITHOUT CONTRAST    CLINICAL HISTORY:  Mental status change, unknown cause;    TECHNIQUE:  Low dose axial CT images obtained throughout the head without the use of intravenous contrast.  Axial, sagittal and coronal reconstructions were performed. All CT scans at this location are performed using dose modulation techniques as appropriate to a performed exam including the following: Automated exposure control; adjustment of the mA and/or kV according to patient size (this includes techniques or standardized protocols for targeted exams where dose is matched to indication/reason for exam; i. e. extremities or head); use of iterative reconstruction technique.    COMPARISON:  MRI brain 06/13/2023.  CT head 06/13/2023.    FINDINGS:  Intracranial compartment:    Small focal areas of prominent subcortical hypoattenuation in the bilateral inferior frontal lobes, relatively symmetric.  Additional small-medium sized, relatively symmetric areas of white matter hypoattenuation in the  paramedian occipital lobes and high parietal lobes which appears to extend into the adjacent cortex.  Questionable early hypoattenuation in the basal ganglia, more prominent on the left.  Remaining brain parenchyma appears intact.  No mass or hemorrhage visualized.  No evidence of hydrocephalus.    No extra-axial blood or fluid collections.    Skull/extracranial contents (limited evaluation):    No fracture. Mastoid air cells and paranasal sinuses are essentially clear.      Impression    Adverse interval change when compared to the recent prior examinations.  New subcortical hypoattenuation in the bilateral frontal lobes and subcortical/cortical hypoattenuation in the paramedian occipital regions.  Questionable early basal ganglia hypoattenuation.  Given this patient's clinical history, findings could be sequela of methanol toxicity.  Differential diagnostic considerations include PRES, vasculitis, and alternate neurotoxicities.  Recommend clinical correlation and consider further imaging follow-up as warranted.    This report was flagged in Epic as abnormal.      Electronically signed by: Carlos Enrique Arizmendi  Date:    06/20/2023  Time:    13:38       TELEPSYCHIATRY:     Patient agreeable to consultation via telepsychiatry.    This consultation was requested by Jet Jesus MD, the patient's treating provider.  The location of the consulting psychiatrist is: [place], [place]  The patient location is:  [facility] INTENSIVE CARE UNIT  Consultation Setting:  ICU  Also present with the patient at the time of the evaluation: hospital staff    Consults    Complexity (level) of medical decision making employed in the encounter: MODERATE    Consult Start Time: 6/20/2023 5:25 PM CDT  Consult End Time: 6/20/2023 5:45 PM CDT  Time with Patient: 5 minutes  Total Time Spent Providing E/M Services: 20 minutes

## 2023-06-20 NOTE — CONSULTS
TELEPSYCHIATRY: SUBSEQUENT EVALUATION     ASSESSMENT AND PLAN:     DIAGNOSES & PROBLEMS:  Delirium  Methanol poisoning  Anxiety    In Summary:  - Patient presents status post methanol poisoning - she and family deny this was an intentional ingestion.  She remains delirious, with waxing and waning mentation and prominent visual hallucinations.  Notable anxiety leading up to admission.     Plan:  - Started on trial of seroquel yesterday by primary team - I discussed medication with family and patient, reviewing pros/cons and potential adverse effects.  Would continue seroquel trial at this time - would not anticipate she will need this long term.  Ok to continue melatonin prn but would avoid benadryl prn as it can worsen delirium.  Once delirium resolves, recommend trial of SSRI to address anxiety - lexapro 5mg daily would be a reasonable place to start.  If information comes to light that would suggest this was a suicide attempt, would then likely PEC - but given she and family deny this was suicide attempt and she further denies any current or past suicidal ideation, does not meet criteria at this time.  She can be reassessed by psychiatry for further evaluation once clears if any concerns remain.      PRESENTATION:     Shania Tapia is a 37 y.o. patient seen for a follow up psychiatric evaluation.  An interval history of the presenting illness (HPI) was obtained, and a pertinent psychiatric and medical review of systems was performed.    Per Chart:  Hospital Medicine Progress Note 6/19/23:  Principal Problem:Methanol poisoning  HPI:  Shania Tapia, a 37-year-old female with a history of Anxiety and Hypertension, was admitted to the ICU on 6/13/2023 following an emergency department presentation for blurry vision, altered color vision, word-finding difficulties, unsteady gait, and jitteriness, which she initially associated with her anxiety. Subsequently, she experienced suspected seizure  "activity and required emergent intubation for airway protection.     Initial workup demonstrated severe high anion gap metabolic acidosis and a dense right upper lobe pneumonia. Suspecting possible toxic alcohol poisoning, specifically methanol (given her vision changes), she was started on fomepizole (last dose on 06/16) and bicarbonate infusion. Renal team initiated emergency hemodialysis (06/14) , and empiric antibiotic therapy was initiated for pneumonia.     After acidosis resolution, bicarbonate infusion was discontinued, and she was successfully extubated on 6/15. 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      Psychiatric consultation deemed her not to meet the criteria for a PEC, although escitalopram was suggested to manage her anxiety. Collateral information from her  did not suggest any suicidal ideation or attempts. He noted, however, significant job-related stress and anxiety in recent months, including stress-induced tremors. She had used various over-the-counter supplements and borrowed prescription anti-anxiety medications from her family members, albeit in a non-seeking manner, for self-management.  The patient was deemed stable for transfer out of the ICU on 6/16/2023.     Overview/Hospital Course:  Since stepdown, Labs returned on 6/16/2023 in the evening confirming presence of Methanol. Send out Volatile labs returned POSITIVE on 6/17/2023 for likely Methanol. Minimal change in mentation since being on the floor, pt has had difficulty sleeping, agitation, auditory and visual hallucinations. . Seroquel ordered for symptoms. Psych consulted for management.      PT/OT recommend rehab placement at this time  SLP recommend Soft & Bite Sized Diet - IDDSI Level 6, Thin liquids - IDDSI Level 0     06/19: Methanol level from 06/14 resulted at 123. Pt oriented x " person, place, time but continues to have visual and auditory hallucinations. Pt seen with RN and mother at bedside. Per pt mother, pt has been having progressive tremors, anxiety and confusion since may but initially symptoms were attributed to work related anxiety. Pt mother reports no known ingestions or known self harm issues but reports that pt may have exposures at work as she works as a . Psychiatry reconsulted. [place] Poison Control/Toxicology notified re: case.     Per Patient:  - states doing well tonight  - still frazzled about how close she was to dying  - feeling anxious  - oriented to person, place, time at this time  - currently feels there is a  going on, that one of her coworkers is dead    Collateral:   Per Nurse:  - not improving  - still hallucinating and bizarre behavior  - answer orientation questions correctly  - agitated at times  - insomnia    Per  and Mother:  - twitching  - hallucinating - visual - seeing people and things  - she's incapacitated, attempting to get out of bed  - issues with stress at work  - methanol poisoning  - does not believe she ingested the methanol purposefully  - both do not believe this was a suicide attempt  - waxing and waning - at times confused, doesn't know where she is      REVIEW OF SYSTEMS:  I[]I Patient unable or unwilling to provide any ROS.    [x] Y  [] N  sleep disturbance: **    [x] Y  [] N  appetite/weight change: *intermittent decreased appetite*    [x] Y  [] N  fatigue/anergia: **    [x] Y  [] N  impairment in focus/concentration: **       [] Y  [x] N  depression: **     [x] Y  [] N  anxiety/worry: **     [] Y  [x] N  somatically preoccupied: **   [x] Y  [] N  dysregulated mood/behavior: *irritable at times*     [] Y  [x] N  manic symptomatology: **     [x] Y  [] N  psychosis: **  Positive for: visual hallucinations         CURRENT PSYCHOTROPIC REGIMEN:  I[x]I Y  I[]I N  I[]I U  "   Seroquel 100mg bedtime  Melatonin 6mg bedtime prn insomnia      PERTINENT PAST HISTORY:     The patient's past psychiatric, family, social and medical history have been reviewed and updated as appropriate within the electronic medical record system.            The electronic chart was reviewed and updated as appropriate.  See Medcard for details.           Additional Relevant History, As Applicable:       EXAMINATION:     BP (!) 185/106   Pulse (!) 111   Temp 98.9 °F (37.2 °C) (Oral)   Resp 18   Ht 5' 1" (1.549 m)   Wt 68.4 kg (150 lb 12.7 oz)   LMP  (LMP Unknown) Comment: last period less than 30 days ago per   SpO2 98%   Breastfeeding No   BMI 28.49 kg/m²     MENTAL STATUS EXAMINATION:  General Appearance & Behavior: lying in bed, in hospital garb, cooperative  Involuntary Movements and Motor Activity: notable for twitching - per  this started prior to seroquel and has not worsened  Speech & Language: decreased spontaneity but answers questions  Mood: okay  Affect: calm, not irritable with me tonight  Thought Process & Associations: fairly linear but confused  Thought Content & Perceptions: +visual hallucinations with delusional material (not well formed).  Sensorium and Cognition: alert but delirious  Insight & Judgment: both impaired      RISK MANAGEMENT:     Risk Parameters:  I[]I Y  I[x]I N  I[]I U  I[]I A  Suicidal Ideation/Behavior: **   I[]I Y  I[x]I N  I[]I U  I[]I A  Homicidal Ideation/Behavior: **  I[]I Y  I[x]I N  I[]I U  I[]I A  Violence: **  I[]I Y  I[x]I N  I[]I U  I[]I A  Self-Injurious Behavior: **    The patient is deemed to be a poor historian.    I[]I Y  I[]I N  I[]I U  I[]I A  I[]I N/A  Minimization of Risk Parameters Suspected/Evident: **  I[]I Y  I[]I N  I[]I U  I[]I A  I[]I N/A  Exaggeration of Risk Parameters Suspected/Evident: **     The patient was able to satisfactorily contract for safety and was noted to be future oriented.  Protective factors were " identified.     Current risk is judged to be:   I[]I Low    I[]I Moderate   I[]I High    [] Y  [] N  I[]I U  I[]I N/A  Danger to Self:   [] Y  [] N  I[]I U  I[]I N/A  Danger to Others:   [] Y  [] N  I[]I U  I[]I N/A  Grave Disability:        Mono Estevez MD  Department of Psychiatry, [facility] Board Certified, Psychiatry and Addiction Medicine      MANAGEMENT:     I[]I Y = Yes / Present / Endorses.  I[]I N = No / Absent / Denies.  I[]I U = Unknown / Unable to Assess / Unwilling to Participate.  I[]I A = Ambiguity Exists / Accuracy Uncertain.  I[]I D = Denial or Minimization is Suspected/Evident.  I[]I N/A = Non-Applicable.    Chart Review: Available documentation has been reviewed, and pertinent elements of the chart have been incorporated into this evaluation where appropriate.  Last Epic encounter with me: Visit date not found.    [x] In cases of emergencies (e.g. SI/HI resulting in danger to self or others, functioning deteriorates to the level of grave disability), call 911 or [phone], or present to the emergency department for immediate assistance.  [x] Patient should not operate a motor vehicle or heavy machinery if effects of medications or underlying symptoms/condition impair the ability to safely do so.  [x] Comply with ANY/ALL medication fully as prescribed/instructed and report ANY/ALL suspected adverse effects to appropriate health care providers.    Written material has been provided to supplement, augment, and reinforce any discussions and interventions, via the AVS and/or other pre-printed handouts.  Alcohol, Tobacco, and Drug Counseling, as well as applicable resources, has been provided, as warranted.  Shared medical decision making and informed consent are the hallmark and bedrock of good clinical care, and as such have been employed and obtained, respectively, to the degree possible.  Risk Mitigation Strategies, Harm Reduction Techniques, and Safety Netting are important  interventions that can reduce acute and chronic risk, and as such have been employed to the degree possible.  Prescription Drug Management entails the review, recommendation, or consideration without recommendation of medications, and as such was employed during the encounter.  Additional Psychoeducation has been provided, as warranted.      -- Discussed, to the extent possible, diagnosis, risks and benefits of proposed treatment vs alternative treatments vs no treatment, potential side effects of these treatments and the inherent unpredictability of treatment. The patient's ability to understand, participate and engage in a conversation surrounding this was deemed to be: minimal.  -- [place]  [facility] site reviewed    Prescriptions   Total: 0   Private Pay: 0     DIAGNOSTIC TESTING:     Blood Counts, Electrolytes & Glucose:   Lab Results   Component Value Date    WBC 5.20 06/19/2023    GRAN 2.7 06/19/2023    GRAN 51.0 06/19/2023    HGB 7.8 (L) 06/19/2023    HCT 25.6 (L) 06/19/2023    MCV 70 (L) 06/19/2023     06/19/2023     06/19/2023    K 2.6 (LL) 06/19/2023    CALCIUM 8.5 (L) 06/19/2023    PHOS 3.4 06/19/2023    MG 2.0 06/19/2023    CO2 28 06/19/2023    ANIONGAP 12 06/19/2023    GLU 96 06/19/2023    HGBA1C 5.5 06/14/2023       Renal, Liver, Pancreas, Thyroid, Parathyroid, Prolactin, CPK, Lipids & Vitamin Levels:   Lab Results   Component Value Date    CREATININE 0.8 06/19/2023    BUN 10 06/19/2023    EGFRNORACEVR >60 06/19/2023    AST 32 06/19/2023    ALT 20 06/19/2023    ALKPHOS 91 06/19/2023    BILITOT 0.5 06/19/2023    ALBUMIN 2.8 (L) 06/19/2023    AMMONIA 43 06/19/2023    TSH 1.259 06/13/2023    CHOL 201 (H) 10/20/2022    TRIG 76 10/20/2022    LDLCALC 134.8 10/20/2022    HDL 51 10/20/2022       Therapeutic Drug Levels:   No results found for: LITHIUM, VALPROATE, CBMZ, LAMOTRIGINE, CLOZAPINE, NORCLOZAP, CLOZNORCLOZ    Addiction:   Lab Results   Component Value Date    PCDSOBENZOD Negative  06/13/2023    BARBITURATES Negative 06/13/2023    PCDSCOMETHA Negative 06/13/2023    OPIATESCREEN Presumptive Positive (A) 06/13/2023    COCAINEMETAB Negative 06/13/2023    AMPHETAMINES Negative 06/13/2023    MARIJUANATHC Negative 06/13/2023    PCDSOPHENCYN Negative 06/13/2023    ALCOHOLMEDIC <10 06/13/2023       Results for orders placed or performed during the hospital encounter of 06/13/23   EKG 12-lead    Collection Time: 06/19/23  1:11 PM    Narrative    Test Reason : E87.8,    Vent. Rate : 105 BPM     Atrial Rate : 105 BPM     P-R Int : 134 ms          QRS Dur : 092 ms      QT Int : 360 ms       P-R-T Axes : 039 002 -14 degrees     QTc Int : 475 ms    Sinus tachycardia  Possible Left atrial enlargement  Incomplete right bundle branch block  T wave abnormality, consider inferior ischemia  Abnormal ECG  No previous ECGs available    Referred By: [email or web address]   SELF           Confirmed By:        Results for orders placed or performed during the hospital encounter of 06/13/23   CT Head Without Contrast    Narrative    EXAMINATION:  CT HEAD WITHOUT CONTRAST    CLINICAL HISTORY:  Mental status change, unknown cause;    TECHNIQUE:  Standard brain CT protocol without IV contrast was performed.    COMPARISON:  None    FINDINGS:  The ventricles have a normal size, position, and appearance. There is no abnormal intracranial mass or intracranial hemorrhage. There is no skull fracture. The paranasal sinuses are normal in appearance.      Impression    Normal study.    All CT scans at this facility use dose modulation, iterative reconstruction, and/or weight base dosing when appropriate to reduce radiation dose when appropriate to reduce radiation dose to as low as reasonably achievable.      Electronically signed by: Carlos Enrique Castro MD  Date:    06/13/2023  Time:    15:09       TELEPSYCHIATRY:     Patient agreeable to consultation via telepsychiatry.    This consultation was requested by Ruthann Burris MD, the patient's  treating provider.  The location of the consulting psychiatrist is: [place], [place]  The patient location is:  [facility] TELEMETRY  Consultation Setting: inpatient unit  Also present with the patient at the time of the evaluation: spouse/significant other and parent(s)    Inpatient consult to Telemedicine - Psych  Consult performed by: Mono Estevez MD  Consult ordered by: Ruthann Burris MD        Complexity (level) of medical decision making employed in the encounter: MODERATE    Consult Start Time: 6/19/2023 7:44 PM CDT  Consult End Time: 6/19/2023 8:27 PM CDT  Time with Patient: 18 minutes  Total Time Spent Providing E/M Services: 43 minutes

## 2023-06-20 NOTE — CONSULTS
Rehab consult not appropriate at this time d/t pt's medical status. CM to remain available to assist with discharge planning needs.

## 2023-06-20 NOTE — PROCEDURES
"Shania Tapia is a 37 y.o. female patient.    Temp: 96.6 °F (35.9 °C) (06/20/23 1215)  Pulse: (!) 138 (06/20/23 1256)  Resp: 16 (06/20/23 1256)  BP: (!) 164/92 (06/20/23 1215)  SpO2: (!) 93 % (06/20/23 1256)  Weight: 69.9 kg (154 lb 1.6 oz) (06/20/23 1117)  Height: 5' 1" (154.9 cm) (06/20/23 1118)       Intubation    Date/Time: 6/20/2023 1:28 PM  Location procedure was performed: [facility] INTENSIVE CARE UNIT  Performed by: Jet Jesus MD  Authorized by: Jet Jesus MD   Assisting provider: Carlos Enrique Roberson RN  Pre-operative diagnosis: Seizure  Post-operative diagnosis: Seizure  Consent Done: Emergent Situation  Indications: airway protection and hypoxemia  Description of findings: Clean oropharyngeal airways   Intubation method: direct  Patient status: unconscious  Preoxygenation: BVM  Sedatives: propofol  Paralytic: rocuronium  Laryngoscope size: Mac 3  Tube type: cuffed  Number of attempts: 1  Cricoid pressure: no  Cords visualized: yes  Post-procedure assessment: ETCO2 monitor  Breath sounds: diminished  Cuff inflated: yes  ETT to lip: 18 cm  Tube secured with: adhesive tape, bite block, ETT ascencio, oral airway, umbilical tape and ties  Chest x-ray interpreted by me.  Chest x-ray findings: endotracheal tube too low (Adjusted and pulled back about 5 cm)  Tube repositioned: tube repositioned successfully  Patient tolerance: Patient tolerated the procedure well with no immediate complications  Technical procedures used: Rapid sequence intubation  Significant surgical tasks conducted by the assistant(s): Patient monitoring  Complications: No  Estimated blood loss (mL): 0  Specimens: No  Implants: Yes    Jet Jesus M.D       6/20/2023    "

## 2023-06-20 NOTE — ASSESSMENT & PLAN NOTE
- Likely contributing to encephalopathy as above   - Methanol level 123 on 06/14/2023  - Nephrology consulted; s/p HD on 06/14  - Received Fomepizole (Last dose on 06/16/2023)   - Discussed with on call  with [place] [facility]- given methanol level on admission, most likely acute exposure/ingestion rather than cumulative exposure.   - Family denies knowledge of intentional ingestion or exposures in the home. ? Occupational exposure vs. Intentional ingestion

## 2023-06-20 NOTE — PROGRESS NOTES
[facility] - Intensive Care ([facility])  Critical Care Medicine  Progress Note    Patient Name: Shania Tapia  MRN: [id number]  Admission Date: 6/13/2023  Hospital Length of Stay: 6 days  Code Status: Full Code  Attending Provider: Jet Jesus MD  Primary Care Provider: Flavia Fink DO   Principal Problem: Methanol poisoning    Subjective:     HPI:  37 year old female with known medical history of HTN and anxiety    Presented to ED with complaint of blurred vision and color vision change along with difficulty finding words and unsteady gait  She endorsed having felt jittery for a couple days which she thought was anxiety until above changes after arriving to work that morning  ED initial work up for neuro etiology  CT head negative  MRI brain also with no acute finding  Labs revealed severe metabolic acidosis CO2 less than 5; ABG 7.065/9.8/108/2.8 on room air; d dimer 9.38  CTA chest shows dense RUL pneumonia    ED initiated IVF bolus; abx; bicarb IVP and repeat abg did not improve  Critical care team was contacted for admission for sepsis with severe metabolic acidosis and encephalopathy    On my arrival to ED to examine and admit; pt had just had episode of jerking in hands suspicious for seizure and was given small dose IV ativan after which she became unresponsive and required emergent intubation for airway support and mechanical ventilation.      Hospital/ICU Course:  6/14:   HAGMA out of proportion to lactate and ketones.  Concern for toxic alcohol, possibly methanol given vision changes.  Given fomepizole.  Osmolal gap returned elevated.  Renal consulted and initiated HD.   JANIS Whelan azithro for RUL pna.  6/15:   Remains on vents.  Bicarb up to 30 and pt now alkalemic.  Bicarb drip stopped and ventilation significantly decreased.  Remains on abx.  6/20/2023 called for rapid response on the floor.  Patient had about 30 seconds of generalized convulsion.  Unresponsive grunting.  O2 sat 95% on room  air.  ABG reviewed showed pH of 7.2 bicarb of 19 CO2 of 48 mm Hg.  Patient was in ICU intubated and transferred to telemetry floor on 06/16.          Interval History: 6/20/2023 called for rapid response on the floor.  Patient had about 30 seconds of generalized convulsion.  Unresponsive grunting.  O2 sat 95% on room air.  ABG reviewed showed pH of 7.2 bicarb of 19 CO2 of 48 mm Hg.  Patient was in ICU intubated and transferred to telemetry floor on 06/16.      Review of Systems   Unable to perform ROS: Patient unresponsive     Objective:     Vital Signs (Most Recent):  Temp: 98.4 °F (36.9 °C) (06/20/23 0912)  Pulse: (!) 129 (06/20/23 1052)  Resp: (!) 27 (06/20/23 1052)  BP: (!) 161/98 (06/20/23 1052)  SpO2: 95 % (06/20/23 1052) Vital Signs (24h Range):  Temp:  [97.8 °F (36.6 °C)-98.9 °F (37.2 °C)] 98.4 °F (36.9 °C)  Pulse:  [104-131] 129  Resp:  [18-27] 27  SpO2:  [92 %-98 %] 95 %  BP: (140-193)/() 161/98     Weight: 69.9 kg (154 lb 1.6 oz)  Body mass index is 29.12 kg/m².      Intake/Output Summary (Last 24 hours) at 6/20/2023 1053  Last data filed at 6/20/2023 0641  Gross per 24 hour   Intake --   Output 2001 ml   Net -2001 ml        Physical Exam  Constitutional:       General: She is in acute distress.      Appearance: She is ill-appearing and toxic-appearing.   Cardiovascular:      Rate and Rhythm: Regular rhythm. Tachycardia present.   Pulmonary:      Effort: Respiratory distress present.   Genitourinary:     Comments: Little catheter  Musculoskeletal:         General: Deformity present.      Comments: Left upper extremity PICC   Skin:     General: Skin is warm.   Neurological:      General: No focal deficit present.         Review of Systems   Unable to perform ROS: Patient unresponsive     Vents:  Vent Mode: (S) Spont (06/15/23 1130)  Set Rate: 12 BPM (06/15/23 1125)  Vt Set: 350 mL (06/15/23 1125)  Pressure Support: 5 cmH20 (06/15/23 1130)  PEEP/CPAP: 5 cmH20 (06/15/23 1130)  Oxygen Concentration (%):  24 (06/18/23 0800)  Peak Airway Pressure: 10 cmH20 (06/15/23 1130)  Plateau Pressure: 20 cmH20 (06/15/23 1130)  Total Ve: 6.71 L/m (06/15/23 1130)  Negative Inspiratory Force (cm H2O): 0 (06/15/23 1130)  F/VT Ratio<105 (RSBI): (!) 22.77 (06/15/23 1130)    Lines/Drains/Airways       Peripherally Inserted Central Catheter Line  Duration             PICC Double Lumen 06/16/23 1307 left basilic 3 days              Drain  Duration                  Urethral Catheter 06/18/23 0244 2 days                    Significant Labs:    CBC/Anemia Profile:  Recent Labs   Lab 06/19/23  0504 06/19/23  1513 06/19/23  1751 06/20/23  0523 06/20/23  1039   WBC 5.20  --   --  5.41  --    HGB 7.8*  --   --  8.4*  --    HCT 25.6*  --   --  28.8* 33*     --   --  321  --    MCV 70*  --   --  72*  --    RDW 20.2*  --   --  20.9*  --    FOLATE  --  19.3  --   --   --    [id number]  --   --  562  --   --         Chemistries:  Recent Labs   Lab 06/19/23  0504 06/20/23  0523    146*   K 2.6* 3.3*    109   CO2 28 24   BUN 10 11   CREATININE 0.8 0.8   CALCIUM 8.5* 9.0   ALBUMIN 2.8* 2.9*   PROT 6.3 6.6   BILITOT 0.5 0.4   ALKPHOS 91 85   ALT 20 20   AST 32 32   MG 2.0 2.0   PHOS 3.4 3.2      Latest Reference Range & Units Most Recent   POC PH 7.35 - 7.45  7.205 (LL)  6/20/23 10:39   POC PCO2 35 - 45 mmHg 48.8 (H)  6/20/23 10:39   POC PO2 80 - 100 mmHg 87  6/20/23 10:39   Sodium, Blood Gas 136 - 145 mmol/L 144  6/20/23 10:39   Potassium, Blood Gas 3.5 - 5.1 mmol/L 3.7  6/20/23 10:39   POC SATURATED O2 95 - 100 % 94 (L)  6/20/23 10:39   POC HCO3 24 - 28 mmol/L 19.3 (L)  6/20/23 10:39   POCT Glucose 70 - 110 mg/dL 167 (H)  6/20/23 10:33   Sample  ARTERIAL  6/20/23 10:39   POC Ionized Calcium 1.06 - 1.42 mmol/L 1.26  6/20/23 10:39   POC Glucose 70 - 110 mg/dL 184 (H)  6/20/23 10:39   POC Hematocrit 36 - 54 %PCV 33 (L)  6/20/23 10:39   POC BE -2 to 2 mmol/L -9  6/20/23 10:39   FiO2  32  6/20/23 10:39   Vt  350  6/15/23 09:10   PiP   28  6/14/23 12:49   PEEP  7  6/15/23 09:10   Flow  3  6/20/23 10:39   DelSys  Nasal Can  6/20/23 10:39   Site  LR  6/20/23 10:39   Mode  SPONT  6/20/23 10:39   Rate  12  6/15/23 09:10        Latest Reference Range & Units Most Recent   Acetaminophen (Tylenol), Serum 10.0 - 20.0 ug/mL <3.0 (L)  6/13/23 21:59   Ethylene Glycol Lvl >=20 (Toxic) mg/dL Not Detected  6/14/23 06:48   Methanol Lvl mg/dL 123 !  6/14/23 06:48   Acetone, Serum mg/dL Not Detected  6/14/23 17:10   Ethanol, Serum mg/dL Not Detected  6/14/23 17:10   Isopropanol, Serum mg/dL Not Detected  6/14/23 17:10   Methanol, Serum mg/dL 90 (HH) (C)  6/14/23 17:10   Volatile Screen Serum, Chain of Custody  Test Not Performed  6/14/23 17:10   Volatile Screen, Serum  Test Not Performed  6/14/23 17:10   Benzodiazepines Negative  Negative  6/13/23 22:00   Methadone metabolites Negative  Negative  6/13/23 22:00   Phencyclidine Negative  Negative  6/13/23 22:00   Toxicology Information  SEE COMMENT  6/13/23 22:00   Cocaine (Metab.) Negative  Negative  6/13/23 22:00   Opiate Scrn, Ur Negative  Presumptive Positive !  6/13/23 22:00   Barbiturate Screen, Ur Negative  Negative  6/13/23 22:00   Amphetamine Screen, Ur Negative  Negative  6/13/23 22:00   Marijuana (THC) Metabolite Negative  Negative  6/13/23 22:00   (HH): Data is critically high  (L): Data is abnormally low  !: Data is abnormal  (C): Corrected    EKG 06/19/2023        Vent. Rate : 105 BPM     Atrial Rate : 105 BPM      P-R Int : 134 ms          QRS Dur : 092 ms       QT Int : 360 ms       P-R-T Axes : 039 002 -14 degrees      QTc Int : 475 ms     Sinus tachycardia   Possible Left atrial enlargement   Incomplete right bundle branch block   T wave abnormality, consider inferior ischemia   Abnormal ECG   No previous ECGs available   Confirmed by Kurt Lind MD   Significant Imaging:      CXR 6/16/2023      CLINICAL HISTORY:  Picc line placement;     TECHNIQUE:  Single frontal view of the chest was  performed.     COMPARISON:  06/14/2023 chest radiograph     FINDINGS:  Stable appearance of the right-sided IJ central venous catheter.  Interval placement of left PICC with tip at the distal SVC region.  Gastric tube has been removed.  Stable hazy opacification of the right upper lung.  New right cardiophrenic opacities.  No effusion or pneumothorax.     The cardiac silhouette is stable.  The hilar and mediastinal contours are stable.     Bones are intact.     Impression:     Mild worsening chest disease, as above.  Gastric tube removed.  Left PICC with satisfactory appearance.  Recommend continued follow-up.        ABG  Recent Labs   Lab 06/20/23  1039   PH 7.205*   PO2 87   PCO2 48.8*   HCO3 19.3*   BE -9     Assessment/Plan:     Neuro  Witnessed seizure  30 seconds of generalized convulsion on telemetry floor.  Seizure precaution ICU monitoring IV Keppra monitor and replete lytes.  Start bicarb drip.  EEG.    Encephalopathy  Negative CT Head and MRI in ED  Suspect related to sepsis and or toxicologic etiologies  Broad spectrum abx and supportive care  6/20 metabolic and respiratory acidosis.  Start bicarb drip.  CT head/MRI when stable.    Pulmonary  Acute respiratory failure with hypoxia  Required intubation for airway protection / respiratory arrest  Adjust vent as needed  VAP prophylaxis  6/20 monitor in ICU.  ABG reviewed.  O2 sat acceptable.  Treat pneumonia    Pneumonia of right upper lobe due to infectious organism  Vanco, CFP, azithro  Sputum sent from endotracheal aspirate for culture  6/20 on Rocephin repeat blood culture check chest x-ray procalcitonin CRP    Renal/  Metabolic acidosis  Severe and out of proportion to lactate and ketones.  Salicylates negative.  Concern for toxic alcohols.  Particularly methanol given reported vision changes.   Concurrent osmolal gap noted on 6/14.  Renal consulted and started on HD.    HAGMA now much improved.  6/20 treated with dialysis and fomepizole for methanol  toxicity.  Repeat BNP.  Bicarb drip.    ID  Severe sepsis  This patient does have evidence of infective focus  My overall impression is sepsis.  Source: Respiratory (confirmed with CT imaging) and Neurologic (Meningitis/Encephalitis less likely)  Antibiotics given-   Antibiotics (72h ago, onward)      Start     Stop Route Frequency Ordered    06/14/23 2130  vancomycin (VANCOCIN) 1,000 mg in dextrose 5 % (D5W) 250 mL IVPB (Vial-Mate)         -- IV Every 12 hours (non-standard times) 06/14/23 1545    06/14/23 0930  ceFEPIme (MAXIPIME) 2 g in dextrose 5 % in water (D5W) 5 % 100 mL IVPB (MB+)         -- IV Every 8 hours (non-standard times) 06/14/23 0136    06/14/23 0900  mupirocin 2 % ointment  (DECOLONIZATION PROTOCOL ORDERS)         06/19 0859 Nasl 2 times daily 06/14/23 0122    06/14/23 0758  vancomycin - pharmacy to dose  (vancomycin IVPB)        See Hyperspace for full Linked Orders Report.    -- IV pharmacy to manage frequency 06/14/23 0658    06/14/23 0135  azithromycin (ZITHROMAX) 500 mg in dextrose 5 % (D5W) 250 mL IVPB (Vial-Mate)         06/17 0144 IV Every 24 hours (non-standard times) 06/14/23 0136          Latest lactate reviewed-  Recent Labs   Lab 06/15/23  0032 06/15/23  0543 06/15/23  0814   LACTATE 1.9 1.8 1.3     Organ dysfunction indicated by Encephalopathy    Fluid challenge Not needed - patient is not hypotensive      Post- resuscitation assessment No - Post resuscitation assessment not needed       Will Not start Pressors- Levophed for MAP of 65  Source control achieved by: abx as above       Critical Care Daily Checklist:    A: Awake: RASS Goal/Actual Goal: RASS Goal: -4-->deep sedation  Actual:     B: Spontaneous Breathing Trial Performed? Spon. Breathing Trial Initiated?: Not initiated (06/14/23 1534)   C: SAT & SBT Coordinated?  Not intubated                      D: Delirium: CAM-ICU Overall CAM-ICU: Negative   E: Early Mobility Performed? Yes   F: Feeding Goal: Goals: 1. Pt will be  initiated onto EN within 24-48 hrs. 2. Pt will consume and tolerate >50% of energy needs by RD follow up.  Status: Nutrition Goal Status: new   Current Diet Order   Procedures    Diet NPO      AS: Analgesia/Sedation Not sedated   T: Thromboembolic Prophylaxis Lovenox   H: HOB > 300 Yes   U: Stress Ulcer Prophylaxis (if needed) Famotidine   G: Glucose Control F fingerstick p.r.n.   B: Bowel Function Stool Occurrence: 1   I: Indwelling Catheter (Lines & Little) Necessity Critically ill keep Little   D: De-escalation of Antimicrobials/Pharmacotherapies Rocephin for strep in sputum    Plan for the day/ETD Y    Code Status:  Family/Goals of Care: Full Code  Mother at bedside   3 seizures of about 1 minute each within an hour, intubated started on Keppra.  Added Versed due to epileptic findings on routine EEG.  Discussed with transfer center.  Accepted at neuro critical care ICU [place] discussed with Dr. Nirmal Cowan   Critical Care Time: 40 minutes  Critical secondary to Patient has a condition that poses threat to life and bodily function:  Seizure      Critical care was time spent personally by me on the following activities: development of treatment plan with patient or surrogate and bedside caregivers, discussions with consultants, evaluation of patient's response to treatment, examination of patient, ordering and performing treatments and interventions, ordering and review of laboratory studies, ordering and review of radiographic studies, pulse oximetry, re-evaluation of patient's condition. This critical care time did not overlap with that of any other provider or involve time for any procedures.     Jet Jesus MD  Critical Care Medicine  Former[facility] - Intensive Care ([facility])

## 2023-06-20 NOTE — PLAN OF CARE
Nutrition Recommendations  1. Recommend pt prescribed Cardiac diet when medically appropriate  2. Recommend Boost Plus TID to assist in filling nutritional gaps  3. If unable to advance diet from NPO by day 5, recommend re-consult to RD for alternative means of nutrition   4. Weigh weekly      Goals:   1. Pt will consume >75% EEN by RD f/u  2. Pt will receive and tolerate Boost Plus TID prior to RD f/u      Nutrition Goal Status: new  Communication of RD Recs: other (comment) (POC: Sticky note)  Rachael Jaramillo, Dietetic Intern

## 2023-06-20 NOTE — ASSESSMENT & PLAN NOTE
Vanco, CFP, azithro  Sputum sent from endotracheal aspirate for culture  6/20 on Rocephin repeat blood culture check chest x-ray procalcitonin CRP

## 2023-06-20 NOTE — ASSESSMENT & PLAN NOTE
CT head and MRI brain with no acute findings   Likely toxic metabolic encephalopathy in setting of methanol toxicity. Differential also includes exacerbation of underlying psychiatric condition vs. Ativan withdrawal (pt reportedly was taking anxiety meds belonging to  and mother, UDS neg on admission for benzos) vs. Wernicke encephalopathy   - TSH wnl on admission. RPR nonreactive, B12 and ammonia wnl  - start folate supplementation  - psychiatry reconsulted 06/19 to eval persistent hallucinations and agitation;recommend seroquel qhs for agitation and melatonin PRN   - pt with decline in mental status today, discussed with neurology, will hold antipsychotic medications for now, obtain stat CT head, MRI and EEG to further eval. May be methanol toxicity vs. Effect of antipsychotics vs. Seizure like activity. Low suspicion for acute CVA   - if AMS persists, consider LP to r/o meningitis/encephalitis   - PT/OT rec rehab placement but at this time, given ongoing hallucinations and agitation, unsure if pt would be able to meaningfully participate with rehab

## 2023-06-20 NOTE — PT/OT/SLP PROGRESS
"Physical Therapy      Patient Name:  Shania Tapia   MRN:  [id number]    08:10 a.m.  Per nurse, Ozzy, request PT to hold treatment for a.m. due to patient needing to rest. She stated patient "had a bad night" and has not slept in several days.        10:28 a.m.  Rapid response called by nursing staff resulting in T/F to ICU.     Will continue to monitor and follow up as appropriate.     "

## 2023-06-20 NOTE — ASSESSMENT & PLAN NOTE
Required intubation for airway protection / respiratory arrest  Adjust vent as needed  VAP prophylaxis  6/20 monitor in ICU.  ABG reviewed.  O2 sat acceptable.  Treat pneumonia

## 2023-06-20 NOTE — PT/OT/SLP PROGRESS
Speech Language Pathology Treatment    Patient Name:  Shania Tapia   MRN:  [id number]  Admitting Diagnosis: Methanol poisoning    Recommendations:                 General Recommendations:  Dysphagia therapy/ongoing swallow assessment as mentation improves  Diet recommendations:  NPO, Liquid Diet Level: NPO   Aspiration Precautions: Frequent oral care and Strict aspiration precautions ; suction set up  General Precautions: Standard, aspiration, NPO  Communication strategies:   Currently nonverbal, not follow commands or visually tracking    Subjective     Pt seen bedside for ST--received call from nursing to re-assess swallow following medical/functional decline over night.  Pt's mother present.  Pt unable to participate in functional communication or swallow assessment.  Patient goals: Unable to state.  Pt now nonverbal.    Pain/Comfort:  Pain Rating 1: 0/10  Pain Rating Post-Intervention 1: 0/10  Pain Rating 2: 0/10  Pain Rating Post-Intervention 2: 0/10    Respiratory Status: Room air    Objective:     Has the patient been evaluated by SLP for swallowing?   Yes  Keep patient NPO? Yes (Medical/status change!)   Current Respiratory Status: room air    Pt eyes open but nonverbal (right head turn/gaze preference), not visually tracking and not following commands.    ST placed tsp and straw on pt's lips; however, absent bolus retrieval noted.  Pt with reduced secretion management as observed by oral holding and right anterior/labial spillage (gown soaked with saliva).  Cough present on secretions x1.  ST and nursing positioned pt upright and placed Yankauer suction at bedside.  ST educated pt's mother on HOB positioning and suction use at bedside.      Of note, ST left room to gather new linens for pt's bed and upon arrival into room, pt's mother trying to give pt water, despite observed dysphagia during ST re-assessment.  ST educated mother on NPO status and aspiration risks of PO intake with pt's current  medical status.  Nursing also notified.  Diet order changed to NPO in system.    Goals:   Multidisciplinary Problems       SLP Goals          Problem: SLP    Goal Priority Disciplines Outcome   SLP Goal     SLP Ongoing, Not Progressing   Description: Patient will tolerate bedside po trials for diet upgrade  TPW complete further cognitive-linguistic testing to assess current level of functioning                     Assessment:     Shania Tapia is a 37 y.o. female with an SLP diagnosis of suspected Dysphagia and Cognitive-Linguistic Impairment.  She presents with medical and functional decline this a.m. with nonverbal communication (moaning only),  impaired visual tracking with right head turn/gaze preference & inability to follow commands.  Right anterior/labial spillage with oral holding of secretions noted.  She is recommended to be placed NPO, following strict aspiration precautions with frequent utilization of Yankauer suction s/t reduced secretion management.  ST to follow as clinically indicated for ongoing swallow assessment as status improves.    Plan:     Patient to be seen:  2 x/week, 3 x/week   Plan of Care expires:  06/26/23  Plan of Care reviewed with:  patient, mother   SLP Follow-Up:  Yes       Discharge recommendations:   (Placement on hold s/t change in medical status, ICU transfer)   Barriers to Discharge:  Medical complexity, decline    Time Tracking:     SLP Treatment Date:   06/20/23  Speech Start Time:  1000  Speech Stop Time:  1100     Speech Total Time (min):  60 min    Billable Minutes: Speech Therapy Individual 15 minutes, Treatment Swallowing Dysfunction 30 minutes, and Self Care/Home Management Training 15 minutes    06/20/2023

## 2023-06-20 NOTE — PROGRESS NOTES
[facility] - Intensive Care (Hospital)  Adult Nutrition  Progress Note    SUMMARY     Recommendations  1. Recommend pt prescribed Cardiac diet when medically appropriate  2. Recommend Boost Plus TID to assist in filling nutritional gaps  3. If unable to advance diet from NPO by day 5, recommend re-consult to RD for alternative means of nutrition   4. Weigh weekly     Goals:   1. Pt will consume >75% EEN by RD f/u  2. Pt will receive and tolerate Boost Plus TID prior to RD f/u     Nutrition Goal Status: new  Communication of RD Recs: other (comment) (POC: Sticky note)    Assessment and Plan    Nutrition Problem  Inadequate PO intake    Related to (etiology):   Decreased ability to consumed sufficient nutrition     Signs and Symptoms (as evidenced by):   NPO    Interventions(treatment strategy):  1. Sodium and Fat Modified Diet: Low Na/Low Saturated Fat and Cholesterol  2. Commercial Beverage  3. Collaboration of care with medical providers.    Nutrition Diagnosis Status:   Continues       Malnutrition Assessment     Skin (Micronutrient): other (see comments) (Torres Score: 15 (Mild risk))                                 Reason for Assessment    Reason For Assessment: Follow up   Diagnosis: cardiac disease  Relevant Medical History: Anxiety, HTN, Encephalopathy, Pneumonia of right upper lobe due to infectious organism, Acute respiratory failure with hypoxia, Severe sepsis, Metabolic acidosis, Hallucinations, Anxiety, Hypokalemia, Hypokalemia, Hypomagnesemia, Hypophosphatemia, Delirium, and Witnessed seizure  Interdisciplinary Rounds: did not attend    General Information Comments:   6/14: 36 y/o female admitted w/ active principal problem of severe sepsis. Pt moved to ICU x7 hrs ago, Intubated (total Ve: 13.7 @9:01am) and sedated (Propofol: 12.4 @ 9:00am) Presented to ED with complaint of blurred vision and color vision change along with difficulty finding words and unsteady gait  She endorsed having felt jittery for a  couple days which she thought was anxiety until above changes after arriving to work that morning  ED initial work up for neuro etiology  CT head negative  MRI brain also with no acute finding  Labs revealed severe metabolic acidosis CO2 less than 5; ABG 7.065/9.8/108/2.8 on room air; d dimer 9.38  CTA chest shows dense RUL pneumonia. MD notes, On my arrival to ED to examine and admit; pt had just had episode of jerking in hands suspicious for seizure and was given small dose IV ativan after which she became unresponsive and required emergent intubation for airway support and mechanical ventilation. NFPE not performed per pt intubated and sedated. Pt is currently charted to weigh 152 lbs 5.4 oz, BMI 28.78 (overweight) Pt LBM was 6/13, no stool consistency charted. Dietitian will continue to monitor.  Nutrition Discharge Planning: Low Na diet    Follow up  6/20: Pt was extubated on 6/15. Pt was intubated today. Initial workup suspects possible toxic alcohol poisoning, specifically methanol. Emergency hemodialysis was performed on 6/14. After extubation, vision abnormalities improved but pt had complaints of visual hallucinations of flowers and spiders. Pt has also experienced auditory hallucinations. Pt stated they were related to her job as a . Pt has a history of anxiety and HTN. Information from  did not suggest any suicidal ideation or attempts. The pt has been struggling with job related stress and anxiety. Pt has been using various supplements and borrowed anti-anxiety prescriptions from family members. Pt mother stated that the pt has remained agitated and hallucinating overnight and did not sleep. Pt is currently not verbal, not following commands, and is having continuous twitching in lower extremities. Pt experienced a seizure this morning and was transferred to ICU. Pt has been placed on NPO status due to multiple scans being ordered. Pt was prescribed a Regular diet and PO intake  "was charted at 25-50% prior to NPO today. NFPE not performed due to pt being transported to ICU. Skin: WDL Torres Score: 15 (mild risk). LBM: 6/18. Labs, Meds, and weight reviewed. Labs 6/20: Sodium (H), Potassium (L), Albumin (L). Meds to note: Ceftriaxone, Enoxaparin, Famotidine, Folic Acid, Hydrochlorothiazide, Levetiracetam, Lorazepam, Metoprolol, Multivitamin, Nifedipine, Potassium chloride, Senna-docusate, and Thiamine. Weight charted 6/20: 154 lbs. 2 lb wt gain x 6 days. BMI: 29.12 (Overweight). RD will continue to monitor.     Nutrition Risk Screen    Nutrition Risk Screen: no indicators present    Nutrition/Diet History    Spiritual, Cultural Beliefs, Yarsani Practices, Values that Affect Care: no  Supplemental Drinks or Food Habits: Other (see comments) (Latex)  Food Allergies:  (Latex)  Factors Affecting Nutritional Intake: impaired cognitive status/motor control    Anthropometrics    Temp: 99.2 °F (37.3 °C)  Height: 5' 1" (154.9 cm)  Height (inches): 61 in  Weight Method: Bed Scale  Weight: 69.9 kg (154 lb 1.6 oz)  Weight (lb): 154.1 lb  Ideal Body Weight (IBW), Female: 105 lb  % Ideal Body Weight, Female (lb): 146.76 %  BMI (Calculated): 29.1  BMI Grade: 25 - 29.9 - overweight     Wt Readings from Last 15 Encounters:   06/20/23 69.9 kg (154 lb 1.6 oz)   10/20/22 76.9 kg (169 lb 8.5 oz)   08/28/22 76.1 kg (167 lb 14.1 oz)   08/26/22 76.7 kg (169 lb 1.5 oz)   10/20/21 79 kg (174 lb 2.6 oz)   04/20/21 77.6 kg (171 lb 1.2 oz)   10/20/20 78.1 kg (172 lb 2.9 oz)   09/23/20 78.1 kg (172 lb 2.9 oz)   12/18/18 84.2 kg (185 lb 10 oz)   12/29/15 78.2 kg (172 lb 6.4 oz)   12/01/14 73.9 kg (163 lb)     Lab/Procedures/Meds  BMP  Lab Results   Component Value Date     (H) 06/20/2023    K 3.3 (L) 06/20/2023     06/20/2023    CO2 24 06/20/2023    BUN 11 06/20/2023    CREATININE 0.8 06/20/2023    CALCIUM 9.0 06/20/2023    ANIONGAP 13 06/20/2023    EGFRNORACEVR >60 06/20/2023     Recent Labs   Lab " 06/20/23  1033   POCTGLUCOSE 167*     Lab Results   Component Value Date    ALBUMIN 2.9 (L) 06/20/2023     Lab Results   Component Value Date    ALT 20 06/20/2023    AST 32 06/20/2023    ALKPHOS 85 06/20/2023    BILITOT 0.4 06/20/2023     Lab Results   Component Value Date    CALCIUM 9.0 06/20/2023    PHOS 3.2 06/20/2023     Pertinent Labs Reviewed: reviewed  Pertinent Medications Reviewed: reviewed  Scheduled Meds:   cefTRIAXone (ROCEPHIN) IVPB  1 g Intravenous Q24H    enoxaparin  40 mg Subcutaneous Daily    famotidine  20 mg Oral BID    folic acid  1 mg Oral Daily    levETIRAcetam (Keppra) IV (PEDS and ADULTS)  500 mg Intravenous Q12H    metoprolol tartrate  50 mg Oral BID    multivitamin  1 tablet Oral Daily    NIFEdipine  30 mg Oral Daily    potassium chloride  40 mEq Oral BID    QUEtiapine  200 mg Oral QHS    senna-docusate 8.6-50 mg  2 tablet Oral BID    thiamine (VITAMIN B1) IVPB  100 mg Intravenous Daily     Continuous Infusions:   sodium chloride 0.9%      sodium bicarbonate drip 150 mL/hr at 06/20/23 1125     PRN Meds:.sodium chloride 0.9%, acetaminophen, artificial tears, dextrose 10%, dextrose 10%, hydrALAZINE, labetaloL, lorazepam, melatonin, ondansetron    Estimated/Assessed Needs    Weight Used For Calorie Calculations: 69.9 kg (154 lb 1.6 oz)  Energy Calorie Requirements (kcal): 1645 kcal (MSJ x 1.25 (Critical Care - non VENT))  Energy Need Method: Denisse-St Christianson  Protein Requirements: 84-140g (1.2-2.0g/kg (Critical Care non-VENT))  Weight Used For Protein Calculations: 69.9 kg (154 lb 1.6 oz)  Fluid Requirements (mL): 1645 mL (1 mL/kcal)  Estimated Fluid Requirement Method: RDA Method  RDA Method (mL): 1645  CHO Requirement: 206g (1645/8)      Nutrition Prescription Ordered    Current Diet Order: NPO    Evaluation of Received Nutrient/Fluid Intake    I/O: (Net since admit)   6/14: +539.1   6/18: -2301 mL  6/20: -3871.7 mL    Energy Calories Required: not meeting needs  Protein Required: not meeting  needs  Fluid Required: exceeds needs  Tolerance: Currently no intake   % Intake of Estimated Energy Needs: Currently no intake   % Meal Intake: NPO    Nutrition Risk    Level of Risk/Frequency of Follow-up: high (x2 weekly)       Monitor and Evaluation    Food and Nutrient Intake: energy intake, enteral nutrition intake  Food and Nutrient Adminstration: enteral and parenteral nutrition administration  Knowledge/Beliefs/Attitudes: food and nutrition knowledge/skill, beliefs and attitudes  Physical Activity and Function: nutrition-related ADLs and IADLs, factors affecting access to physical activity  Anthropometric Measurements: weight, weight change, body mass index  Biochemical Data, Medical Tests and Procedures: electrolyte and renal panel, gastrointestinal profile, glucose/endocrine profile, inflammatory profile, lipid profile  Nutrition-Focused Physical Findings: overall appearance, skin, extremities, muscles and bones, head and eyes       Nutrition Follow-Up    RD Follow-up?: Yes  Rachael Jaramillo Dietetic Intern

## 2023-06-20 NOTE — SUBJECTIVE & OBJECTIVE
Interval History: 6/20/2023 called for rapid response on the floor.  Patient had about 30 seconds of generalized convulsion.  Unresponsive grunting.  O2 sat 95% on room air.  ABG reviewed showed pH of 7.2 bicarb of 19 CO2 of 48 mm Hg.  Patient was in ICU intubated and transferred to telemetry floor on 06/16.      Review of Systems   Unable to perform ROS: Patient unresponsive     Objective:     Vital Signs (Most Recent):  Temp: 98.4 °F (36.9 °C) (06/20/23 0912)  Pulse: (!) 129 (06/20/23 1052)  Resp: (!) 27 (06/20/23 1052)  BP: (!) 161/98 (06/20/23 1052)  SpO2: 95 % (06/20/23 1052) Vital Signs (24h Range):  Temp:  [97.8 °F (36.6 °C)-98.9 °F (37.2 °C)] 98.4 °F (36.9 °C)  Pulse:  [104-131] 129  Resp:  [18-27] 27  SpO2:  [92 %-98 %] 95 %  BP: (140-193)/() 161/98     Weight: 69.9 kg (154 lb 1.6 oz)  Body mass index is 29.12 kg/m².      Intake/Output Summary (Last 24 hours) at 6/20/2023 1053  Last data filed at 6/20/2023 0641  Gross per 24 hour   Intake --   Output 2001 ml   Net -2001 ml        Physical Exam  Constitutional:       General: She is in acute distress.      Appearance: She is ill-appearing and toxic-appearing.   Cardiovascular:      Rate and Rhythm: Regular rhythm. Tachycardia present.   Pulmonary:      Effort: Respiratory distress present.   Genitourinary:     Comments: Little catheter  Musculoskeletal:         General: Deformity present.      Comments: Left upper extremity PICC   Skin:     General: Skin is warm.   Neurological:      General: No focal deficit present.         Review of Systems   Unable to perform ROS: Patient unresponsive     Vents:  Vent Mode: (S) Spont (06/15/23 1130)  Set Rate: 12 BPM (06/15/23 1125)  Vt Set: 350 mL (06/15/23 1125)  Pressure Support: 5 cmH20 (06/15/23 1130)  PEEP/CPAP: 5 cmH20 (06/15/23 1130)  Oxygen Concentration (%): 24 (06/18/23 0800)  Peak Airway Pressure: 10 cmH20 (06/15/23 1130)  Plateau Pressure: 20 cmH20 (06/15/23 1130)  Total Ve: 6.71 L/m (06/15/23  1130)  Negative Inspiratory Force (cm H2O): 0 (06/15/23 1130)  F/VT Ratio<105 (RSBI): (!) 22.77 (06/15/23 1130)    Lines/Drains/Airways       Peripherally Inserted Central Catheter Line  Duration             PICC Double Lumen 06/16/23 1307 left basilic 3 days              Drain  Duration                  Urethral Catheter 06/18/23 0244 2 days                    Significant Labs:    CBC/Anemia Profile:  Recent Labs   Lab 06/19/23  0504 06/19/23  1513 06/19/23  1751 06/20/23  0523 06/20/23  1039   WBC 5.20  --   --  5.41  --    HGB 7.8*  --   --  8.4*  --    HCT 25.6*  --   --  28.8* 33*     --   --  321  --    MCV 70*  --   --  72*  --    RDW 20.2*  --   --  20.9*  --    FOLATE  --  19.3  --   --   --    NOGYHAUC07  --   --  562  --   --         Chemistries:  Recent Labs   Lab 06/19/23  0504 06/20/23  0523    146*   K 2.6* 3.3*    109   CO2 28 24   BUN 10 11   CREATININE 0.8 0.8   CALCIUM 8.5* 9.0   ALBUMIN 2.8* 2.9*   PROT 6.3 6.6   BILITOT 0.5 0.4   ALKPHOS 91 85   ALT 20 20   AST 32 32   MG 2.0 2.0   PHOS 3.4 3.2      Latest Reference Range & Units Most Recent   POC PH 7.35 - 7.45  7.205 (LL)  6/20/23 10:39   POC PCO2 35 - 45 mmHg 48.8 (H)  6/20/23 10:39   POC PO2 80 - 100 mmHg 87  6/20/23 10:39   Sodium, Blood Gas 136 - 145 mmol/L 144  6/20/23 10:39   Potassium, Blood Gas 3.5 - 5.1 mmol/L 3.7  6/20/23 10:39   POC SATURATED O2 95 - 100 % 94 (L)  6/20/23 10:39   POC HCO3 24 - 28 mmol/L 19.3 (L)  6/20/23 10:39   POCT Glucose 70 - 110 mg/dL 167 (H)  6/20/23 10:33   Sample  ARTERIAL  6/20/23 10:39   POC Ionized Calcium 1.06 - 1.42 mmol/L 1.26  6/20/23 10:39   POC Glucose 70 - 110 mg/dL 184 (H)  6/20/23 10:39   POC Hematocrit 36 - 54 %PCV 33 (L)  6/20/23 10:39   POC BE -2 to 2 mmol/L -9  6/20/23 10:39   FiO2  32  6/20/23 10:39   Vt  350  6/15/23 09:10   PiP  28  6/14/23 12:49   PEEP  7  6/15/23 09:10   Flow  3  6/20/23 10:39   DelSys  Nasal Can  6/20/23 10:39   Site  LR  6/20/23 10:39   Mode   SPONT  6/20/23 10:39   Rate  12  6/15/23 09:10        Latest Reference Range & Units Most Recent   Acetaminophen (Tylenol), Serum 10.0 - 20.0 ug/mL <3.0 (L)  6/13/23 21:59   Ethylene Glycol Lvl >=20 (Toxic) mg/dL Not Detected  6/14/23 06:48   Methanol Lvl mg/dL 123 !  6/14/23 06:48   Acetone, Serum mg/dL Not Detected  6/14/23 17:10   Ethanol, Serum mg/dL Not Detected  6/14/23 17:10   Isopropanol, Serum mg/dL Not Detected  6/14/23 17:10   Methanol, Serum mg/dL 90 (HH) (C)  6/14/23 17:10   Volatile Screen Serum, Chain of Custody  Test Not Performed  6/14/23 17:10   Volatile Screen, Serum  Test Not Performed  6/14/23 17:10   Benzodiazepines Negative  Negative  6/13/23 22:00   Methadone metabolites Negative  Negative  6/13/23 22:00   Phencyclidine Negative  Negative  6/13/23 22:00   Toxicology Information  SEE COMMENT  6/13/23 22:00   Cocaine (Metab.) Negative  Negative  6/13/23 22:00   Opiate Scrn, Ur Negative  Presumptive Positive !  6/13/23 22:00   Barbiturate Screen, Ur Negative  Negative  6/13/23 22:00   Amphetamine Screen, Ur Negative  Negative  6/13/23 22:00   Marijuana (THC) Metabolite Negative  Negative  6/13/23 22:00   (HH): Data is critically high  (L): Data is abnormally low  !: Data is abnormal  (C): Corrected    EKG 06/19/2023        Vent. Rate : 105 BPM     Atrial Rate : 105 BPM      P-R Int : 134 ms          QRS Dur : 092 ms       QT Int : 360 ms       P-R-T Axes : 039 002 -14 degrees      QTc Int : 475 ms     Sinus tachycardia   Possible Left atrial enlargement   Incomplete right bundle branch block   T wave abnormality, consider inferior ischemia   Abnormal ECG   No previous ECGs available   Confirmed by Kurt Lind MD   Significant Imaging:      CXR 6/16/2023      CLINICAL HISTORY:  Picc line placement;     TECHNIQUE:  Single frontal view of the chest was performed.     COMPARISON:  06/14/2023 chest radiograph     FINDINGS:  Stable appearance of the right-sided IJ central venous catheter.   Interval placement of left PICC with tip at the distal SVC region.  Gastric tube has been removed.  Stable hazy opacification of the right upper lung.  New right cardiophrenic opacities.  No effusion or pneumothorax.     The cardiac silhouette is stable.  The hilar and mediastinal contours are stable.     Bones are intact.     Impression:     Mild worsening chest disease, as above.  Gastric tube removed.  Left PICC with satisfactory appearance.  Recommend continued follow-up.

## 2023-06-20 NOTE — SIGNIFICANT EVENT
Rapid response called approx 10:30 AM after pt was noted to have shaking of upper and lower extremities concerning for seizure. Per bedside RN, episode lasted approx 30-40 seconds. Evaluated at bedside, no tonic/clonic activity noted but mental status otherwise unchanged since this AM. VS: 140/81, , sats 95% on RA. Pt was given 0.5 mg IV Ativan. Stat ABG ordered. . ICU team at bedside and pt transferred to ICU for further management- discussed with Dr. Jesus. CT head, MRI and EEG ordered and pending.     Ruthann Burris MD   [facility]

## 2023-06-20 NOTE — PROGRESS NOTES
Report called to LUCINA Dai at [facility]. Transport set up with [facility] per [facility]. Pt to go to room 413

## 2023-06-20 NOTE — ASSESSMENT & PLAN NOTE
- continue home Nifedipine   - add norvasc   - PRN IV Hydralazine, labetalol   - if remains NPO, may need to schedule IVP lopressor pending BP trends   - monitor BP

## 2023-06-20 NOTE — ASSESSMENT & PLAN NOTE
Per report, pt suffers from anxiety, reports to a lot of stress and anxiety associated with her job in addition to unintentional 10-15 lb weight loss over the last couple of months  Tele psych consulted on  06/16 and 06/17, pt did not meet criterial for PEC  Per pt mother, no prev hx of self- harm   Pt has worsening hallucinations and agitation, required Haldol and Ativan x 1 on 06/18. Per family, pt has not been sleeping at all.   Seroquel started on 06/18  Psych reconsulted 06/19- recommend increase in Seroquel dose, avoidance of benadryl. Discussed with Dr. Estevez- he recommends using seroquel if needed for agitation

## 2023-06-21 LAB
BACTERIA SPEC AEROBE CULT: ABNORMAL
BACTERIA SPEC AEROBE CULT: ABNORMAL
GRAM STN SPEC: ABNORMAL
GRAM STN SPEC: ABNORMAL

## 2023-06-21 NOTE — NURSING
EMS transporting pt to [facility]. Assisted with transferring pt to stretcher. VSS. Versed, Propofol, and Na Bicarb gtts infusing and sent with EMS.  updated via phone call of pt departure. [facility] also notified of pt departure.

## 2023-06-22 ENCOUNTER — DOCUMENTATION ONLY (OUTPATIENT)
Dept: NEPHROLOGY | Facility: HOSPITAL | Age: 38
End: 2023-06-22
Payer: COMMERCIAL

## 2023-06-22 NOTE — PROGRESS NOTES
Renal addendum note:  This is to document that medical necessity for repeated visits during dialysis was patient's severe critical status. 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 Pt was in respiratory failure and was intubated. Visiting patient only once during dialysis would have been sub-standard nephrology care. Patient needed on-going and repeated evaluations.    Chika Arcos MD

## 2023-06-22 NOTE — ASSESSMENT & PLAN NOTE
30 seconds of generalized convulsion on telemetry floor.  Seizure precaution ICU monitoring IV Keppra monitor and replete lytes.  Start bicarb drip.  EEG.  6/20 transferring patient to [facility] for continuous EEG monitor

## 2023-06-22 NOTE — DISCHARGE SUMMARY
[facility] ([facility])  Critical Care Medicine  Discharge Summary      Patient Name: Shania Tapia  MRN: [id number]  Admission Date: 6/13/2023  Hospital Length of Stay: 6 days  Discharge Date and Time:  06/22/2023 12:50 PM  Attending Physician: No att. providers found   Discharging Provider: Jet Jesus MD  Primary Care Provider: Flavia Fink DO  Reason for Admission: seizure     HPI:   37 year old female with known medical history of HTN and anxiety    Presented to ED with complaint of blurred vision and color vision change along with difficulty finding words and unsteady gait  She endorsed having felt jittery for a couple days which she thought was anxiety until above changes after arriving to work that morning  ED initial work up for neuro etiology  CT head negative  MRI brain also with no acute finding  Labs revealed severe metabolic acidosis CO2 less than 5; ABG 7.065/9.8/108/2.8 on room air; d dimer 9.38  CTA chest shows dense RUL pneumonia    ED initiated IVF bolus; abx; bicarb IVP and repeat abg did not improve  Critical care team was contacted for admission for sepsis with severe metabolic acidosis and encephalopathy    On my arrival to ED to examine and admit; pt had just had episode of jerking in hands suspicious for seizure and was given small dose IV ativan after which she became unresponsive and required emergent intubation for airway support and mechanical ventilation.      * No surgery found *    Indwelling Lines/Drains at Time of Discharge:   Lines/Drains/Airways     Peripherally Inserted Central Catheter Line  Duration           PICC Double Lumen 06/16/23 1307 left basilic 5 days          Drain  Duration                NG/OG Tube 06/20/23 16 Fr. Center mouth 2 days          Airway  Duration                Airway - Non-Surgical 06/20/23 1249 Endotracheal Tube 2 days              Hospital Course:    6/14:   HAGMA out of proportion to lactate and ketones.  Concern for toxic  alcohol, possibly methanol given vision changes.  Given fomepizole.  Osmolal gap returned elevated.  Renal consulted and initiated HD.   JANIS Whelan azithro for RUL pna.   6/15:   Remains on vents.  Bicarb up to 30 and pt now alkalemic.  Bicarb drip stopped and ventilation significantly decreased.  Remains on abx.  6/20/2023 called for rapid response on the floor.  Patient had about 30 seconds of generalized convulsion.  Unresponsive grunting.  O2 sat 95% on room air.  ABG reviewed showed pH of 7.2 bicarb of 19 CO2 of 48 mm Hg.  Patient was in ICU intubated and transferred to telemetry floor on 06/16.          Consults (From admission, onward)        Status Ordering Provider     Inpatient consult to Neurology  Once        Provider:  Francisco Nieto MD    Completed HEIDE BARCENAS     Inpatient consult to Telemedicine - Psych  Once        Provider:  (Not yet assigned)    Completed HEIDE BARCENAS     Inpatient consult to Social Work  Once        Provider:  (Not yet assigned)    Completed JORGE CAMPBELL     Inpatient consult to Telemedicine - Psych  Once        Provider:  Violet Roberts MD    Completed UBALDO ENG     Inpatient consult to Registered Dietitian/Nutritionist  Once        Provider:  (Not yet assigned)    Completed UBALDO ENG            Pending Diagnostic Studies:     Procedure Component Value Units Date/Time    Methanol [[id number]] Collected: 06/20/23 1108    Order Status: Sent Lab Status: In process Updated: 06/20/23 1530    Specimen: Blood     Vitamin B1 [[id number]] Collected: 06/20/23 0556    Order Status: Sent Lab Status: In process Updated: 06/20/23 1015    Specimen: Blood         Final Active Diagnoses:    Diagnosis Date Noted POA    PRINCIPAL PROBLEM:  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] 06/19/2023 Yes    Witnessed seizure [R56.9] 06/20/2023 Unknown    Delirium [R41.0] 06/19/2023 Yes    Anxiety [F41.9] 06/16/2023 Yes    Hypokalemia [E87.6] 06/16/2023 Yes     Hypomagnesemia [E83.42] 06/16/2023 Yes    Hypophosphatemia [E83.39] 06/16/2023 Yes    Hallucinations [R44.3] 06/15/2023 Unknown    Encephalopathy [G93.40] 06/14/2023 Yes    Pneumonia of right upper lobe due to infectious organism [J18.9] 06/14/2023 Yes    Acute respiratory failure with hypoxia [J96.01] 06/14/2023 Yes    Severe sepsis [A41.9, R65.20] 06/14/2023 Yes    Metabolic acidosis [E87.20] 06/14/2023 Yes    Essential hypertension [I10] 09/23/2020 Yes      Problems Resolved During this Admission:    Diagnosis Date Noted Date Resolved POA    High serum osmolar gap [R74.8] 06/15/2023 06/19/2023 Yes     Neuro  Witnessed seizure  30 seconds of generalized convulsion on telemetry floor.  Seizure precaution ICU monitoring IV Keppra monitor and replete lytes.  Start bicarb drip.  EEG.  6/20 transferring patient to [facility] for continuous EEG monitor      Discharged Condition: critical    Disposition: Another Health Care Inst*      Medications:  Transfer Medications (for Discharge Readmit only):   No current facility-administered medications for this encounter.     No current outpatient medications on file.     Facility-Administered Medications Ordered in Other Encounters   Medication Dose Route Frequency Provider Last Rate Last Admin    acetaminophen 160 mg/5 mL (5 mL) liquid (ADULTS) 649.6 mg  649.6 mg Per OG tube Q6H PRN Rafi Malave NP   649.6 mg at 06/21/23 1938    albuterol-ipratropium 2.5 mg-0.5 mg/3 mL nebulizer solution 3 mL  3 mL Nebulization Q4H PRN Rafi Malave NP        alteplase injection 2 mg  2 mg Intra-Catheter Once Jet Jesus MD        calcium gluconate 1 g in NS IVPB (premixed)  1 g Intravenous PRN Sherwin Liang MD        calcium gluconate 1 g in NS IVPB (premixed)  2 g Intravenous PRN Sherwin Liang MD        calcium gluconate 1 g in NS IVPB (premixed)  3 g Intravenous PRN Sherwin Liang MD        calcium gluconate 1 g in NS IVPB (premixed)  1 g Intravenous PRN Sherwin Liang MD         calcium gluconate 1 g in NS IVPB (premixed)  2 g Intravenous PRN Sherwin Liang MD        calcium gluconate 1 g in NS IVPB (premixed)  3 g Intravenous PRN Sherwin Liang MD        chlorhexidine 0.12 % solution 15 mL  15 mL Mouth/Throat BID Nirmal Godoy MD   15 mL at 06/22/23 0902    dexmedetomidine (PRECEDEX) 200 mcg/50 mL infusion  0-1.4 mcg/kg/hr Intravenous Continuous Nirmal Godoy MD 14.7 mL/hr at 06/22/23 1200 1.1 mcg/kg/hr at 06/22/23 1200    dextrose 10 % infusion   Intravenous Continuous PRN Nirmal Godoy MD        dextrose 50% injection 12.5 g  12.5 g Intravenous PRN Nirmal Godoy MD        famotidine tablet 20 mg  20 mg Per OG tube BID Nirmal Godoy MD   20 mg at 06/22/23 0902    folic acid tablet 1 mg  1 mg Per OG tube Daily Rafi Malave NP   1 mg at 06/22/23 0903    glucagon (human recombinant) injection 1 mg  1 mg Intramuscular PRN Nirmal Godoy MD        heparin (porcine) injection 5,000 Units  5,000 Units Subcutaneous Q8H Nirmal Godoy MD   5,000 Units at 06/22/23 0538    hydrALAZINE injection 10 mg  10 mg Intravenous Q4H PRN Rafi Malave NP   10 mg at 06/22/23 0917    insulin aspart U-100 pen 0-5 Units  0-5 Units Subcutaneous Q4H PRN Nirmal Godoy MD        labetalol 20 mg/4 mL (5 mg/mL) IV syring  10 mg Intravenous Q4H PRN Rafi Malave NP   10 mg at 06/22/23 0849    lactated ringers infusion   Intravenous Continuous Rafi Malave NP 30 mL/hr at 06/22/23 0856 New Bag at 06/22/23 0856    levETIRAcetam injection 1,000 mg  1,000 mg Intravenous Q12H Rafi Malave NP   1,000 mg at 06/22/23 0902    magnesium sulfate 2g in water 50mL IVPB (premix)  2 g Intravenous PRN Sherwin Liang MD        magnesium sulfate 2g in water 50mL IVPB (premix)  4 g Intravenous PRN Sherwin Liang MD        magnesium sulfate 2g in water 50mL IVPB (premix)  2 g Intravenous PRN Sherwin Liang MD        magnesium sulfate 2g in water 50mL IVPB (premix)  4 g  Intravenous PRN Sherwin Liang MD        mupirocin 2 % ointment   Nasal BID Nirmal Godoy MD   1 g at 06/22/23 0902    ondansetron injection 4 mg  4 mg Intravenous Q8H PRN Rafi Malave NP        piperacillin-tazobactam (ZOSYN) 4.5 g in dextrose 5 % in water (D5W) 5 % 100 mL IVPB (MB+)  4.5 g Intravenous Q8H Nirmal Godoy MD   Stopped at 06/22/23 1302    potassium chloride 40 mEq in 100 mL IVPB (FOR CENTRAL LINE ADMINISTRATION ONLY)  40 mEq Intravenous PRN Sherwin Liang MD        And    potassium chloride 20 mEq in 100 mL IVPB (FOR CENTRAL LINE ADMINISTRATION ONLY)  60 mEq Intravenous PRN Sherwin Liang MD        And    potassium chloride 40 mEq in 100 mL IVPB (FOR CENTRAL LINE ADMINISTRATION ONLY)  80 mEq Intravenous PRN Sherwin Liang MD        potassium chloride 40 mEq in 100 mL IVPB (FOR CENTRAL LINE ADMINISTRATION ONLY)  40 mEq Intravenous PRN Sherwin Liang MD 25 mL/hr at 06/22/23 1144 40 mEq at 06/22/23 1144    And    potassium chloride 20 mEq in 100 mL IVPB (FOR CENTRAL LINE ADMINISTRATION ONLY)  60 mEq Intravenous PRN Sherwin Liang MD        And    potassium chloride 40 mEq in 100 mL IVPB (FOR CENTRAL LINE ADMINISTRATION ONLY)  80 mEq Intravenous PRN Sherwin Liang MD        senna-docusate 8.6-50 mg per tablet 1 tablet  1 tablet Per OG tube BID Rafi Malave NP   1 tablet at 06/21/23 2106    sodium chloride 0.9% flush 10 mL  10 mL Intravenous PRN Rafi Malave NP        sodium phosphate 15 mmol in dextrose 5 % (D5W) 250 mL IVPB  15 mmol Intravenous PRN Sherwin Liang MD        sodium phosphate 15 mmol in dextrose 5 % (D5W) 250 mL IVPB  15 mmol Intravenous PRN Sherwin Liang MD        sodium phosphate 20.01 mmol in dextrose 5 % (D5W) 250 mL IVPB  20.01 mmol Intravenous PRN Sherwin Liang MD        sodium phosphate 20.01 mmol in dextrose 5 % (D5W) 250 mL IVPB  20.01 mmol Intravenous PRN Sherwin Liang MD        sodium phosphate 30 mmol in dextrose 5 % (D5W) 250 mL IVPB  30 mmol Intravenous PRN Sherwin Liang MD        sodium  phosphate 30 mmol in dextrose 5 % (D5W) 250 mL IVPB  30 mmol Intravenous PRN Sherwin Liang MD        thiamine tablet 100 mg  100 mg Per OG tube Daily Rafi Malave NP   100 mg at 06/22/23 0902    vancomycin 750 mg in dextrose 5 % (D5W) 250 mL IVPB (Vial-Mate)  750 mg Intravenous Q12H Nirmal Godoy MD   Stopped at 06/22/23 1033        Jet Jesus MD  Critical Care Medicine  [facility] ([facility])

## 2023-06-23 PROBLEM — D50.9 MICROCYTIC ANEMIA: Status: ACTIVE | Noted: 2023-06-23

## 2023-06-23 PROBLEM — R50.9 FEVER: Status: ACTIVE | Noted: 2023-06-23

## 2023-06-23 LAB
BACTERIA SPEC AEROBE CULT: NORMAL
BACTERIA SPEC AEROBE CULT: NORMAL
GRAM STN SPEC: NORMAL
GRAM STN SPEC: NORMAL
VIT B1 BLD-MCNC: 88 UG/L (ref 38–122)

## 2023-06-24 LAB — METHANOL SERPL-MCNC: <5 MG/DL

## 2023-06-25 PROBLEM — A41.9 SEVERE SEPSIS: Status: RESOLVED | Noted: 2023-06-14 | Resolved: 2023-06-25

## 2023-06-25 PROBLEM — R65.20 SEVERE SEPSIS: Status: RESOLVED | Noted: 2023-06-14 | Resolved: 2023-06-25

## 2023-06-25 LAB
BACTERIA BLD CULT: NORMAL
BACTERIA BLD CULT: NORMAL

## 2023-06-29 NOTE — CONSULTS
"[facility] ([facility])  [facility] Medicine  Consult Note    Patient Name: Shania Tapia  MRN: [id number]  Admission Date: 6/13/2023  Hospital Length of Stay: 1 days  Attending Physician: Cynthia Grayson DO   Primary Care Provider: Flavia Fink DO           Patient information was obtained from patient, spouse/SO, past medical records, ER records and Critical care team.     Consults  Subjective:     Principal Problem: Metabolic acidosis    Chief Complaint:   Chief Complaint   Patient presents with    Blurred Vision     Blurred vision, aphasia, anxiety, unsteady gait, color vision altered, pt states for "few days", tachycardic and tachypneic in triage, Dr. Sherwood evaluated patient in triage on arrival        HPI: Shania Tapia is a 37 y.o. female with Anxiety, and Hypertension who presented to ED on 6/13/2023 with complaint of blurry vision, color vision changes, difficulty with word findings and unsteady gait. Additionally have been feeling jittery for a couple days prior which she initially attributed to anxiety. Work up in ED included CT head and MRI brain which showed no acute findings. Labs showed severe high anion gap metabolic acidosis with CO2 < 5, ABG 7.065/9.8/108/2.8 on room air; D-dimer 9.38. CTA chest shows dense RUL pneumonia    Started on empiric antibiotics, IV bicarbonate push, with no improvement noted in repeat ABG. Patient then was noted to have an episode of hand jerkings suspicious for seizure like activity, given a dose of Ativan then went unresponsive. Subsequently was emergently intubated for airway protection. Admitted to ICU for further management    ICU Course:  Patient with HAGMA out of proportion to lactate and ketones.  Concern for toxic alcohol, possibly methanol given vision changes.  Started on fomepizole, bicarbonate infusion.  Renal consulted and initiated emergency HD on admission.   Also started on cefepime, azithromycin, and vanc for empiric treatment of " "RUL pneumonia    Bicarbonate drip discontinued after acidosis resolved. Successfully extubated on 6/15, transitioned to NC. Patient denies ingestion of any known toxins, or self harm ideation. Vision returned to baseline. However she notes seeing "flowers" and "spiders" at times. States that she knows they're not real. Psych consult pending    Stable for transfer out of ICU, hospital medicine consulted for continued care.    Patient seen and examined with  and mother present.  She is unable to recall why she came to the hospital.  However she denies any complaints, oriented x3.  Reports that blurriness in her vision is continuing to improve as the day progress.  Family believes that appears to be back to her usual baseline.  Per patient she has been under lot of stress with her job recently thinks that when she sees what's not there is because she spends a lot of time staring at the screen in the course of her job as a       Past Medical History:   Diagnosis Date    Abnormal Pap smear     Abnormal Pap smear of vagina     Anemia     Anxiety     Hypertension        Past Surgical History:   Procedure Laterality Date    CERVICAL BIOPSY      Conization        Review of patient's allergies indicates:   Allergen Reactions    Latex Rash    Latex, natural rubber Rash       No current facility-administered medications on file prior to encounter.     Current Outpatient Medications on File Prior to Encounter   Medication Sig    hydroCHLOROthiazide (HYDRODIURIL) 12.5 MG Tab Take 1 tablet (12.5 mg total) by mouth daily as needed (swelling).    NIFEdipine (PROCARDIA-XL) 30 MG (OSM) 24 hr tablet TAKE 1 TABLET BY MOUTH EVERY DAY     Family History       Problem Relation (Age of Onset)    Diabetes Paternal Grandmother    Heart attack Father    Hypertension Father          Tobacco Use    Smoking status: Never    Smokeless tobacco: Never   Substance and Sexual Activity    Alcohol use: No    Drug " use: No    Sexual activity: Yes     Partners: Male     Birth control/protection: None     Review of Systems   Constitutional:  Negative for chills and fever.   Eyes:  Positive for visual disturbance.        Blurriness improving   Respiratory:  Negative for cough, shortness of breath and wheezing.    Cardiovascular:  Negative for chest pain and palpitations.   Gastrointestinal:  Negative for abdominal pain, constipation, diarrhea, nausea and vomiting.   Neurological:  Negative for dizziness and headaches.   All other systems reviewed and are negative.  Objective:     Vital Signs (Most Recent):  Temp: 98.4 °F (36.9 °C) (06/15/23 1600)  Pulse: (!) 118 (06/15/23 1909)  Resp: (!) 21 (06/15/23 1909)  BP: (!) 137/92 (06/15/23 1800)  SpO2: 100 % (06/15/23 1909) Vital Signs (24h Range):  Temp:  [98.3 °F (36.8 °C)-101.1 °F (38.4 °C)] 98.4 °F (36.9 °C)  Pulse:  [102-144] 118  Resp:  [10-32] 21  SpO2:  [87 %-100 %] 100 %  BP: ()/(52-92) 137/92  Arterial Line BP: ()/() 147/79     Weight: 69.1 kg (152 lb 5.4 oz)  Body mass index is 28.78 kg/m².     Physical Exam  Vitals and nursing note reviewed. Exam conducted with a chaperone present.   Constitutional:       General: She is not in acute distress.     Appearance: She is not ill-appearing.   HENT:      Head: Normocephalic and atraumatic.      Right Ear: External ear normal.      Left Ear: External ear normal.      Nose: Nose normal.      Mouth/Throat:      Mouth: Mucous membranes are moist.   Eyes:      Extraocular Movements: Extraocular movements intact.      Pupils: Pupils are equal, round, and reactive to light.   Cardiovascular:      Rate and Rhythm: Regular rhythm. Tachycardia present.   Pulmonary:      Effort: Pulmonary effort is normal. No accessory muscle usage or respiratory distress.      Breath sounds: Examination of the right-upper field reveals rhonchi. Rhonchi present. No wheezing.   Abdominal:      General: Bowel sounds are normal. There is no  distension.      Palpations: Abdomen is soft.      Tenderness: There is no abdominal tenderness. There is no guarding or rebound.   Genitourinary:     Comments: Little catheter in place  Musculoskeletal:      Cervical back: Neck supple.      Right lower leg: No edema.      Left lower leg: No edema.   Skin:     General: Skin is warm and dry.   Neurological:      Mental Status: She is alert and oriented to person, place, and time.      Cranial Nerves: No cranial nerve deficit.      Motor: No weakness.   Psychiatric:         Attention and Perception: She perceives visual hallucinations.         Mood and Affect: Mood is elated.         Behavior: Behavior is cooperative.      Comments: Noted to be picking on her gown        Significant Labs: All pertinent labs within the past 24 hours have been reviewed.  CBC:   Recent Labs   Lab 06/14/23  0431 06/15/23  0543   WBC 25.53* 10.62   HGB 9.8* 8.4*   HCT 35.6* 27.6*   * 104*     CMP:   Recent Labs   Lab 06/14/23  0046 06/14/23  0432 06/14/23  2053 06/15/23  0032 06/15/23  0543      < > 141 138 138   K 3.8   < > 3.0* 2.8* 3.3*      < > 97 94* 95   CO2 <5*   < > 29 30* 30*   *   < > 82 98 105   BUN 7   < > 3* 4* 7   CREATININE 0.9   < > 0.4* 0.6 0.8   CALCIUM 9.1   < > 8.4* 8.7 8.3*   PROT 8.8*  --   --   --  5.9*   ALBUMIN 4.0  --   --   --  2.4*   BILITOT 0.6  --   --   --  1.4*   ALKPHOS 106  --   --   --  85   AST 31  --   --   --  28   ALT 23  --   --   --  14   ANIONGAP Unable to calculate   < > 15 14 13    < > = values in this interval not displayed.     Magnesium:   Recent Labs   Lab 06/14/23  1155 06/15/23  0543   MG 1.4* 1.5*       Significant Imaging: I have reviewed all pertinent imaging results/findings within the past 24 hours.    Assessment/Plan:     * Metabolic acidosis  Initially presented with high anion gap metabolic acidosis and high osmolar gap with CO2 < 5, pH 7.0  Severe and out of proportion to lactate and ketones.  Salicylates  negative.  Concern for toxic alcohols.  Particularly methanol given reported vision changes.   Methanol, ethylene glycol, diastolic glycol labs pending  Concurrent osmolal gap noted on 6/14  Nephrology consulted, patient had emergency HD done in addition to initiation bicarbonate infusion   Acidosis improved    Visual hallucination  Patient reports seeing things that she knows it is not there  Additionally also reports to a lot of stress and anxiety associated with her job in addition to unintentional 10-15 lb weight loss over the last couple of months  Tele psych consult pending    Severe sepsis  This patient does have evidence of infective focus  My overall impression is sepsis.  Source: Respiratory  Antibiotics given-   Antibiotics (72h ago, onward)    Start     Stop Route Frequency Ordered    06/15/23 1700  cefTRIAXone (ROCEPHIN) 1 g in dextrose 5 % in water (D5W) 5 % 100 mL IVPB (MB+)         06/21 1659 IV Every 24 hours (non-standard times) 06/15/23 1416    06/14/23 0900  mupirocin 2 % ointment  (DECOLONIZATION PROTOCOL ORDERS)         06/19 0859 Nasl 2 times daily 06/14/23 0122    06/14/23 0135  azithromycin (ZITHROMAX) 500 mg in dextrose 5 % (D5W) 250 mL IVPB (Vial-Mate)         06/17 0144 IV Every 24 hours (non-standard times) 06/14/23 0136        Latest lactate reviewed-  Recent Labs   Lab 06/15/23  0032 06/15/23  0543 06/15/23  0814   LACTATE 1.9 1.8 1.3     Organ dysfunction indicated by Encephalopathy    Fluid challenge Not needed - patient is not hypotensive      Post- resuscitation assessment No - Post resuscitation assessment not needed       Will Not start Pressors- Levophed for MAP of 65  Source control achieved by: IV antibiotics    Acute respiratory failure with hypoxia  Patient required emergent intubation for airway protection in ED  Successfully extubated on 06/15/2023 and transitioned to nasal cannula  Wean O2 as able   Continue treatment for pneumonia    Pneumonia of right upper lobe due to  infectious organism  Large right upper lobe opacity favoring pneumonia  Empirically started on vancomycin, cefepime and azithromycin initially  Endotracheal aspirate sent for culture, we will follow  Transitioned to ceftriaxone and azithromycin  Leukocytosis improving      Encephalopathy  Negative imaging with CT head and MRI brain  Suspected to be related to sepsis and toxic logic etiologies  Continuing broad-spectrum antibiotics and supportive care    VTE Risk Mitigation (From admission, onward)         Ordered     enoxaparin injection 40 mg  Daily         06/14/23 0122                Critical care time spent on the evaluation and treatment of severe organ dysfunction, review of pertinent labs and imaging studies, discussions with consulting providers and discussions with patient/family: 50 minutes.    Thank you for your consult. I will follow-up with patient. Please contact us if you have any additional questions.    Cynthia Grayson DO  Department of Hospital Medicine   [facility] ([facility])     36.8

## 2023-07-02 PROBLEM — F29 PSYCHOTIC DISORDER: Status: ACTIVE | Noted: 2023-07-02

## 2023-07-04 PROBLEM — R50.9 FEVER: Status: RESOLVED | Noted: 2023-06-23 | Resolved: 2023-07-04

## 2023-07-04 PROBLEM — J96.01 ACUTE RESPIRATORY FAILURE WITH HYPOXIA: Status: RESOLVED | Noted: 2023-06-14 | Resolved: 2023-07-04

## 2023-07-04 PROBLEM — E87.20 METABOLIC ACIDOSIS: Status: RESOLVED | Noted: 2023-06-14 | Resolved: 2023-07-04

## 2023-07-06 ENCOUNTER — DOCUMENTATION ONLY (OUTPATIENT)
Dept: NEPHROLOGY | Facility: HOSPITAL | Age: 38
End: 2023-07-06
Payer: COMMERCIAL

## 2023-07-06 PROBLEM — J18.9 PNEUMONIA OF RIGHT UPPER LOBE DUE TO INFECTIOUS ORGANISM: Status: RESOLVED | Noted: 2023-06-14 | Resolved: 2023-07-06

## 2023-07-06 PROBLEM — R56.9 WITNESSED SEIZURE: Status: RESOLVED | Noted: 2023-06-20 | Resolved: 2023-07-06

## 2023-07-06 PROBLEM — F41.9 ANXIETY: Status: RESOLVED | Noted: 2023-06-16 | Resolved: 2023-07-06

## 2023-07-06 PROBLEM — E83.42 HYPOMAGNESEMIA: Status: RESOLVED | Noted: 2023-06-16 | Resolved: 2023-07-06

## 2023-07-06 PROBLEM — R41.0 DELIRIUM: Status: RESOLVED | Noted: 2023-06-19 | Resolved: 2023-07-06

## 2023-07-06 PROBLEM — I10 ESSENTIAL HYPERTENSION: Status: RESOLVED | Noted: 2020-09-23 | Resolved: 2023-07-06

## 2023-07-06 PROBLEM — E83.39 HYPOPHOSPHATEMIA: Status: RESOLVED | Noted: 2023-06-16 | Resolved: 2023-07-06

## 2023-07-06 PROBLEM — E87.6 HYPOKALEMIA: Status: RESOLVED | Noted: 2023-06-16 | Resolved: 2023-07-06

## 2023-07-06 PROBLEM — F29 PSYCHOTIC DISORDER: Status: RESOLVED | Noted: 2023-07-02 | Resolved: 2023-07-06

## 2023-07-06 NOTE — PROGRESS NOTES
DOS 6/14/23  Renal addendum note:  This is to document that medical necessity for repeated visits during dialysis was patient's severe critical status. 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 Pt was in respiratory failure and was intubated. Visiting patient only once during dialysis would have been sub-standard nephrology care. Patient needed on-going and repeated evaluations.    Chika Arcos MD  Nephrology

## 2023-07-13 ENCOUNTER — TELEPHONE (OUTPATIENT)
Dept: INTERNAL MEDICINE | Facility: CLINIC | Age: 38
End: 2023-07-13
Payer: COMMERCIAL

## 2023-07-13 NOTE — TELEPHONE ENCOUNTER
Per pcp msg>>>>Paperwork completed for hospital stay only.   She would need to follow up with me or be seen virtually if additional time is needed, but they should have given her that excuse at discharge.   Also, these messages should be in the patient's chart (not her mom's), so please ask her mother to send any additional messages that way or call.    Mother has been sending msgs per her chart for patiet.

## 2023-08-01 ENCOUNTER — PATIENT OUTREACH (OUTPATIENT)
Dept: ADMINISTRATIVE | Facility: HOSPITAL | Age: 38
End: 2023-08-01
Payer: COMMERCIAL

## 2023-08-01 NOTE — PROGRESS NOTES
Called patient to confirm hospital follow up scheduled on 8/03/2023.   Patient is not confirmed. Attempted to contact pt to confirm appt. No answer, voice mail box full, unable to leave mess.

## 2023-08-01 NOTE — PROGRESS NOTES
Ms Edgar (mother) returned call. States Ms Tapia is still in the hospital and expected to be discharged tomorrow,8/02/23. She said to confirm appt and if any reason she can't make it will call to reschedule.

## 2023-08-02 NOTE — PROGRESS NOTES
Spoke with Ms Edgar (mother) and confirmed Ms Tapia did get discharged and will keep appt scheduled for 8/03/23.

## 2023-08-03 ENCOUNTER — OFFICE VISIT (OUTPATIENT)
Dept: INTERNAL MEDICINE | Facility: CLINIC | Age: 38
End: 2023-08-03
Payer: COMMERCIAL

## 2023-08-03 VITALS
RESPIRATION RATE: 20 BRPM | HEIGHT: 61 IN | SYSTOLIC BLOOD PRESSURE: 118 MMHG | OXYGEN SATURATION: 98 % | BODY MASS INDEX: 22.2 KG/M2 | TEMPERATURE: 98 F | HEART RATE: 133 BPM | DIASTOLIC BLOOD PRESSURE: 66 MMHG

## 2023-08-03 DIAGNOSIS — G40.909 SEIZURE DISORDER: ICD-10-CM

## 2023-08-03 DIAGNOSIS — R44.3 HALLUCINATIONS: ICD-10-CM

## 2023-08-03 DIAGNOSIS — I10 ESSENTIAL HYPERTENSION: ICD-10-CM

## 2023-08-03 DIAGNOSIS — G93.41 METABOLIC ENCEPHALOPATHY: Primary | ICD-10-CM

## 2023-08-03 PROCEDURE — 1111F PR DISCHARGE MEDS RECONCILED W/ CURRENT OUTPATIENT MED LIST: ICD-10-PCS | Mod: CPTII,S$GLB,, | Performed by: INTERNAL MEDICINE

## 2023-08-03 PROCEDURE — 3074F SYST BP LT 130 MM HG: CPT | Mod: CPTII,S$GLB,, | Performed by: INTERNAL MEDICINE

## 2023-08-03 PROCEDURE — 99214 OFFICE O/P EST MOD 30 MIN: CPT | Mod: S$GLB,,, | Performed by: INTERNAL MEDICINE

## 2023-08-03 PROCEDURE — 1160F RVW MEDS BY RX/DR IN RCRD: CPT | Mod: CPTII,S$GLB,, | Performed by: INTERNAL MEDICINE

## 2023-08-03 PROCEDURE — 99999 PR PBB SHADOW E&M-EST. PATIENT-LVL V: ICD-10-PCS | Mod: PBBFAC,,, | Performed by: INTERNAL MEDICINE

## 2023-08-03 PROCEDURE — 99999 PR PBB SHADOW E&M-EST. PATIENT-LVL V: CPT | Mod: PBBFAC,,, | Performed by: INTERNAL MEDICINE

## 2023-08-03 PROCEDURE — 1111F DSCHRG MED/CURRENT MED MERGE: CPT | Mod: CPTII,S$GLB,, | Performed by: INTERNAL MEDICINE

## 2023-08-03 PROCEDURE — 1159F PR MEDICATION LIST DOCUMENTED IN MEDICAL RECORD: ICD-10-PCS | Mod: CPTII,S$GLB,, | Performed by: INTERNAL MEDICINE

## 2023-08-03 PROCEDURE — 3074F PR MOST RECENT SYSTOLIC BLOOD PRESSURE < 130 MM HG: ICD-10-PCS | Mod: CPTII,S$GLB,, | Performed by: INTERNAL MEDICINE

## 2023-08-03 PROCEDURE — 3044F HG A1C LEVEL LT 7.0%: CPT | Mod: CPTII,S$GLB,, | Performed by: INTERNAL MEDICINE

## 2023-08-03 PROCEDURE — 1159F MED LIST DOCD IN RCRD: CPT | Mod: CPTII,S$GLB,, | Performed by: INTERNAL MEDICINE

## 2023-08-03 PROCEDURE — 3078F PR MOST RECENT DIASTOLIC BLOOD PRESSURE < 80 MM HG: ICD-10-PCS | Mod: CPTII,S$GLB,, | Performed by: INTERNAL MEDICINE

## 2023-08-03 PROCEDURE — 99214 PR OFFICE/OUTPT VISIT, EST, LEVL IV, 30-39 MIN: ICD-10-PCS | Mod: S$GLB,,, | Performed by: INTERNAL MEDICINE

## 2023-08-03 PROCEDURE — 3008F PR BODY MASS INDEX (BMI) DOCUMENTED: ICD-10-PCS | Mod: CPTII,S$GLB,, | Performed by: INTERNAL MEDICINE

## 2023-08-03 PROCEDURE — 3008F BODY MASS INDEX DOCD: CPT | Mod: CPTII,S$GLB,, | Performed by: INTERNAL MEDICINE

## 2023-08-03 PROCEDURE — 3078F DIAST BP <80 MM HG: CPT | Mod: CPTII,S$GLB,, | Performed by: INTERNAL MEDICINE

## 2023-08-03 PROCEDURE — 1160F PR REVIEW ALL MEDS BY PRESCRIBER/CLIN PHARMACIST DOCUMENTED: ICD-10-PCS | Mod: CPTII,S$GLB,, | Performed by: INTERNAL MEDICINE

## 2023-08-03 PROCEDURE — 3044F PR MOST RECENT HEMOGLOBIN A1C LEVEL <7.0%: ICD-10-PCS | Mod: CPTII,S$GLB,, | Performed by: INTERNAL MEDICINE

## 2023-08-03 RX ORDER — TEMAZEPAM 7.5 MG/1
15 CAPSULE ORAL NIGHTLY PRN
COMMUNITY
End: 2023-10-13

## 2023-08-03 NOTE — PROGRESS NOTES
Shania Tapia  37 y.o. 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     Hospital course note:   Patient was transferred to [facility] Neuro Critical Care Unit. She started on antiepileptic medications with improvement in her epileptic activity on EEG.  EEG was discontinued.  Heavy metal screen was negative.  While there methanol level was significantly elevated at outside hospital, it is now negative.  Patient intermittently agitated requiring additional doses of Ativan.  Discussed with poison control Dr. Santi Delvalle no additional recommendations at this time patient may potentially benefit from repeat EEG.  Neurointensivist expects slow improvement.  Patient was stepped down to medical floor. Neurology consulted and iv ativan is being weaned off. Keppra and vimpat changed to oral formulation.  Psychiatry consulted as patient continues to have hallucinations/agitation needing roll belt and mitts to avoid hurting herself - now improved and no longer needing these.  Weaned off ativan.      Pt continues to improve daily and is now stable on Risperdal, Depakote and Klonopin.  Also receiving keppra and vimpat for seizures. She has been working with PT/OT/ST and would benefit from IPR. DC to SNF     She was discharged from [facility] yesterday.   She is here with her  and mother.   She will continue outpatient rehab.   She is in a wheelchair.   She continues to have problems with her memory and mobility.   She has not had any seizures, but needs to see neurology.   She is no longer hallucinating. The family feels the medications are working and wish to continue.   Her b/p is controlled on metoprolol.     Laboratory:  Lab Results   Component Value Date    WBC 2.62 (L) 07/11/2023    HGB  9.4 (L) 07/11/2023    HCT 31.4 (L) 07/11/2023     07/11/2023    CHOL 201 (H) 10/20/2022    TRIG 76 10/20/2022    HDL 51 10/20/2022    ALT 41 (H) 07/02/2023    AST 42 (H) 07/02/2023     07/11/2023    K 3.5 07/11/2023     07/11/2023    CREATININE 0.76 07/11/2023    BUN 17 07/11/2023    CO2 28 07/11/2023    TSH 1.259 06/13/2023    HGBA1C 5.5 06/14/2023       History:  Past Medical History:   Diagnosis Date    Abnormal Pap smear     Abnormal Pap smear of vagina     Anemia     Anxiety     High serum osmolar gap 6/15/2023    Hypertension     Visual hallucination 6/15/2023       Medications:  Current Outpatient Medications on File Prior to Visit   Medication Sig Dispense Refill    divalproex (DEPAKOTE SPRINKLE) 125 mg capsule Take 1 capsule (125 mg total) by mouth 2 (two) times a day. 60 capsule 11    folic acid (FOLVITE) 1 MG tablet Take 1 tablet (1 mg total) by mouth once daily. 30 tablet 0    levETIRAcetam (KEPPRA) 1000 MG tablet Take 1 tablet (1,000 mg total) by mouth 2 (two) times daily. 60 tablet 11    metoprolol tartrate (LOPRESSOR) 25 MG tablet Take 1 tablet (25 mg total) by mouth 2 (two) times daily. 60 tablet 11    risperiDONE (RISPERDAL M-TABS) 3 MG disintegrating tablet Take 1 tablet (3 mg total) by mouth 2 (two) times a day. 60 tablet 11    temazepam (RESTORIL) 7.5 MG Cap Take 15 mg by mouth nightly as needed.      thiamine 100 MG tablet Take 1 tablet (100 mg total) by mouth once daily. 30 tablet 0     No current facility-administered medications on file prior to visit.       Allergies:  Review of patient's allergies indicates:   Allergen Reactions    Latex Rash    Latex, natural rubber Rash       Review of Systems   Constitutional:  Negative for fever.   Eyes:  Positive for blurred vision (right eye).   Respiratory:  Negative for shortness of breath.    Cardiovascular:  Negative for chest pain and leg swelling.   Gastrointestinal:  Negative for abdominal pain.   Genitourinary:          Incontinent    Neurological:  Positive for weakness. Negative for dizziness and seizures.   Psychiatric/Behavioral:  Negative for hallucinations. The patient has insomnia.        Exam:  Vitals:    08/03/23 1410   BP: 118/66   Pulse: (!) 133   Resp: 20   Temp: 98.1 °F (36.7 °C)         Body mass index is 22.2 kg/m².      Physical Exam  Vitals reviewed.   Constitutional:       General: She is not in acute distress.     Appearance: She is well-developed. She is not ill-appearing.   Eyes:      General: No scleral icterus.  Cardiovascular:      Rate and Rhythm: Normal rate and regular rhythm.      Heart sounds: Normal heart sounds.   Pulmonary:      Effort: Pulmonary effort is normal. No respiratory distress.      Breath sounds: Normal breath sounds.   Abdominal:      General: Bowel sounds are normal.      Palpations: Abdomen is soft.   Skin:     General: Skin is warm and dry.   Neurological:      Mental Status: She is alert.   Psychiatric:         Behavior: Behavior normal.         Assessment:  The primary encounter diagnosis was Metabolic encephalopathy. Diagnoses of Seizure disorder, Hallucinations, and Essential hypertension were also pertinent to this visit.    Plan:  Metabolic encephalopathy  -     continue to monitor    Seizure disorder  -     continue Keppra  -     Ambulatory referral/consult to Neurology; Future; Expected date: 08/10/2023    Hallucinations  -     improved with medication    Essential hypertension  -     continue metoprolol    [facility] in October for annual visit as previously scheduled.

## 2023-09-05 ENCOUNTER — PATIENT MESSAGE (OUTPATIENT)
Dept: INTERNAL MEDICINE | Facility: CLINIC | Age: 38
End: 2023-09-05
Payer: COMMERCIAL

## 2023-09-08 ENCOUNTER — PATIENT MESSAGE (OUTPATIENT)
Dept: INTERNAL MEDICINE | Facility: CLINIC | Age: 38
End: 2023-09-08
Payer: COMMERCIAL

## 2023-09-13 ENCOUNTER — PATIENT MESSAGE (OUTPATIENT)
Dept: INTERNAL MEDICINE | Facility: CLINIC | Age: 38
End: 2023-09-13
Payer: COMMERCIAL

## 2023-09-13 DIAGNOSIS — R44.3 HALLUCINATIONS: Primary | ICD-10-CM

## 2023-09-21 ENCOUNTER — TELEPHONE (OUTPATIENT)
Dept: PSYCHIATRY | Facility: CLINIC | Age: 38
End: 2023-09-21
Payer: COMMERCIAL

## 2023-09-25 ENCOUNTER — TELEPHONE (OUTPATIENT)
Dept: PSYCHIATRY | Facility: CLINIC | Age: 38
End: 2023-09-25
Payer: COMMERCIAL

## 2023-09-26 ENCOUNTER — PATIENT MESSAGE (OUTPATIENT)
Dept: PSYCHIATRY | Facility: CLINIC | Age: 38
End: 2023-09-26
Payer: COMMERCIAL

## 2023-09-27 ENCOUNTER — PATIENT MESSAGE (OUTPATIENT)
Dept: PSYCHIATRY | Facility: CLINIC | Age: 38
End: 2023-09-27
Payer: COMMERCIAL

## 2023-10-13 ENCOUNTER — OFFICE VISIT (OUTPATIENT)
Dept: PSYCHIATRY | Facility: CLINIC | Age: 38
End: 2023-10-13
Payer: COMMERCIAL

## 2023-10-13 DIAGNOSIS — F29: Primary | ICD-10-CM

## 2023-10-13 DIAGNOSIS — F41.9 ANXIETY DISORDER, UNSPECIFIED TYPE: ICD-10-CM

## 2023-10-13 DIAGNOSIS — T51.1X4A: ICD-10-CM

## 2023-10-13 DIAGNOSIS — R44.3 HALLUCINATIONS: ICD-10-CM

## 2023-10-13 PROCEDURE — 99205 PR OFFICE/OUTPT VISIT, NEW, LEVL V, 60-74 MIN: ICD-10-PCS | Mod: 95,,, | Performed by: PSYCHOLOGIST

## 2023-10-13 PROCEDURE — 99205 OFFICE O/P NEW HI 60 MIN: CPT | Mod: 95,,, | Performed by: PSYCHOLOGIST

## 2023-10-13 PROCEDURE — 3044F HG A1C LEVEL LT 7.0%: CPT | Mod: CPTII,95,, | Performed by: PSYCHOLOGIST

## 2023-10-13 PROCEDURE — 3044F PR MOST RECENT HEMOGLOBIN A1C LEVEL <7.0%: ICD-10-PCS | Mod: CPTII,95,, | Performed by: PSYCHOLOGIST

## 2023-10-13 RX ORDER — DIVALPROEX SODIUM 125 MG/1
125 CAPSULE, COATED PELLETS ORAL 2 TIMES DAILY
Qty: 60 CAPSULE | Refills: 11 | Status: CANCELLED | OUTPATIENT
Start: 2023-10-13 | End: 2024-10-12

## 2023-10-13 RX ORDER — RISPERIDONE 2 MG/1
2 TABLET ORAL 2 TIMES DAILY
Qty: 60 TABLET | Refills: 0 | Status: SHIPPED | OUTPATIENT
Start: 2023-10-13 | End: 2023-10-27

## 2023-10-13 RX ORDER — RISPERIDONE 3 MG/1
3 TABLET, ORALLY DISINTEGRATING ORAL 2 TIMES DAILY
Qty: 60 TABLET | Refills: 11 | Status: CANCELLED | OUTPATIENT
Start: 2023-10-13 | End: 2024-10-12

## 2023-10-13 NOTE — PROGRESS NOTES
PSYCHIATRIC EVALUATION: VIRTUAL    Disclaimer: Evaluation and treatment is based on information presented to date. Any new information may affect assessment and findings.     Name: Shania Tapia  Age: 38 y.o.  : 1985    Preferred Name: Shania    Referring provider: Dr. Flavia Fink    Chief Complaint:  Visual hallucinations    History of Present Illness:   Pt was referred to psychiatry by her PCP, Dr. 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 Pt's  and her mother are present for this virtual appointment. Pt is currently prescribed Risperdal 2-4 mg bid, Temazepam 7.5 mg qhs, Depakote 125 mg bid, and Keppra 1000 mg bid. 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 It is unclear how pt was exposed to methanol. She has no prior mental health history, aside from work-related stress/anxiety when facing deadlines for large projects. This was confirmed by pt's mother and .    Pt was initially seen at [facility] on 23, admitted to the ICU, then transferred on 23 to [facility] Neuro Critical Care Unit for seizures, visual hallucinations, and intermittent agitation. She was discharged to [facility] on 23 for continued management of medical care. Pt and her family would like to discuss decreasing dose of Risperdal because pt appears over-medication, sedated much of the time.    Hospital Records provided by pt's mother from [facility] 23 and [facility] 23:  HPI: 37-year-old female with a past medical history of anxiety and hypertension that was admitted to ICU at [facility] on  for sepsis, metabolic toxicity. She would present with blurred vision altered color vision, word finding unsteady gait and jitteriness. She  would initially associate symptoms with anxiety however she was subsequently experience of suspected seizure. She woright upper lobe pneumonia. Per record theruld require emergent intubation for airway protection. Initial work-up would reveal severe metabolic acidosis and dense e was suspicion for possible toxic alcohol poisoning specifically methanol given visual changes. She was started on fomepizole in addition to bicarb drip. Renal was consulted and she underwent emergent dialysis was initiated on empiric antibiotic therapy for pneumonia. She was successfully extubated on Jeni 15, & would have continued to have visual abnormalities with reported intermittent hallucinations. 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 Psychiatric consultation was made and felt the patient was not criteria for PEC and Lexapro was suggested to manage her anxiety. Hospital record she had reportedly used various over-the-counter supplements and bar prescription antianxiety medication from family members but was reported to be done and not seeking manner. She was eventually stepdown out of ICU and on June 28 underwent EEG. She would later have a tonic-clonic seizure and was reintubated and again admitted to neuro critical care. Results of EEG presently not available however she was initiated on antiepileptics with Keppra. She was eventually stepped back down to the medicine floor and the remainder of pertinent information regarding her hospitalization is unfortunately unavailable to due to a lack of sufficient records. He was ultimately referred and admitted to [facility] on July 12, 2023 for continuation of medical management and care.    Pt currently reports no hallucinations or perceptual disturbance since July. She is unable to walk on her own and requires wheelchair with assistance. She is not feeling depressed  and stays hopeful regarding her prognosis. She is tired and has some difficulty sleeping. She has noticed problems with attention and focus since the incident with increased distractibility. She hopes to return to work as a  when fully recovered. Pt does not report past or present depression symptoms and does not report symptoms consistent with a manic/hypomanic episode. She has no past psychiatric hospitalizations and no past suicide attempts.     Pt and her  have been  for the past 6 years. They have one toddler daughter. Pt's mother has been staying with them off and on to assist pt and her  with household tasks and caring for her grand-daughter. 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 Her  works full time as a  for [employer]. He has taken leave as much as possible since pt's injury and illness started in mid-June and has been able to work remotely from home as much as possible to be with pt while she recovers. They both grew up in [place] and have extended family to whom they are close.     ADHD: inattentive, easily distracted   Depressive Disorder: tired/fatigued, psychomotor change   Anxiety Disorder: fatigue, sleep problems   Panic Disorder: denied   Manic Disorder: denied   Psychotic Disorder: visual hallucinations   Substance Use:  denied     Review of Systems   Constitutional:  Positive for fatigue. Negative for activity change, appetite change and unexpected weight change.   Respiratory:  Negative for shortness of breath.    Musculoskeletal:  Positive for gait problem.   Psychiatric/Behavioral:  Negative for agitation, behavioral problems, confusion, decreased concentration, dysphoric mood, hallucinations, self-injury, sleep disturbance and suicidal ideas. The patient is not nervous/anxious and is not hyperactive.         Nutritional Screening: Considering the patient's height and weight, medications, medical history and preferences,  "should a referral be made to the dietitian? no    Constitutional:  Vitals:  Most recent vital signs were reviewed.   Last 3 sets of Vitals        6/21/2023    10:37 AM 8/3/2023     2:10 PM 10/20/2023     7:51 AM   Vitals - 1 value per visit   SYSTOLIC  118 114   DIASTOLIC  66 68   Pulse  133 70   Temp  98.1 °F (36.7 °C) 98.4 °F (36.9 °C)   Resp  20    SPO2  98 % 100 %   Height 5' 1" (1.549 m) 5' 1" (1.549 m) 5' 1" (1.549 m)   Pain Score  Zero Zero          Psychiatric:  Oriented: x 3 / including: Date: 10/13/23; and aware meeting with [facility] [place]   Attitude: cooperative, very pleasant   Eye Contact: good   Behavior: wnl   Mood: "okay"  Affect: appropriate range; s/w restricted    Attention: intact   Concentration: grossly intact   Thought Process: goal directed   Speech: intelligible  Volume: WNL   Quantity: WNL   Rhythm: s/w slowed  Insight: fair to good   Threats: no SI / HI   Memory: Grossly intact  Psychosis: denies all currently; visual hallucinations June-July  Estimate of Intellectual Function: average   Judgment (to simple situation): fair to good   Relevant Elements of Neurological Exam: immobile due to weakness; in wheelchair    Medical history:   Past Medical History:   Diagnosis Date    Abnormal Pap smear     Abnormal Pap smear of vagina     Anemia     Anxiety     High serum osmolar gap 6/15/2023    Hypertension     Visual hallucination 6/15/2023        Family History:  Family History   Problem Relation Age of Onset    Diabetes Paternal Grandmother     Hypertension Father     Heart attack Father     Breast cancer Neg Hx     Colon cancer Neg Hx     Ovarian cancer Neg Hx         Family history of psychiatric illness: None known.    PSYCHO-SOCIAL DEVELOPMENT HISTORY:   Social History     Socioeconomic History    Marital status:    Tobacco Use    Smoking status: Never    Smokeless tobacco: Never   Substance and Sexual Activity    Alcohol use: No    Drug use: No    Sexual activity: Yes     " Partners: Male     Birth control/protection: None     Social Determinants of Health     Financial Resource Strain: Low Risk  (6/14/2023)    Overall Financial Resource Strain (CARDIA)     Difficulty of Paying Living Expenses: Not hard at all   Food Insecurity: No Food Insecurity (6/14/2023)    Hunger Vital Sign     Worried About Running Out of Food in the Last Year: Never true     Ran Out of Food in the Last Year: Never true   Transportation Needs: No Transportation Needs (6/14/2023)    PRAPARE - Transportation     Lack of Transportation (Medical): No     Lack of Transportation (Non-Medical): No   Physical Activity: Insufficiently Active (6/14/2023)    Exercise Vital Sign     Days of Exercise per Week: 2 days     Minutes of Exercise per Session: 20 min   Stress: Stress Concern Present (10/19/2022)    [employer] - Occupational Stress Questionnaire     Feeling of Stress : To some extent   Social Connections: Unknown (10/19/2022)    Social Connection and Isolation Panel [NHANES]     Frequency of Communication with Friends and Family: More than three times a week     Frequency of Social Gatherings with Friends and Family: More than three times a week     Active Member of Clubs or Organizations: No     Attends Club or Organization Meetings: Patient refused     Marital Status:    Housing Stability: Low Risk  (6/14/2023)    Housing Stability Vital Sign     Unable to Pay for Housing in the Last Year: No     Number of Places Lived in the Last Year: 1     Unstable Housing in the Last Year: No        Allergy Review:   Review of patient's allergies indicates:   Allergen Reactions    Latex Rash    Latex, natural rubber Rash        Medical Problem List:   Patient Active Problem List   Diagnosis    Encephalopathy    Hallucinations    Methanol poisoning    Status epilepticus    Microcytic anemia        Encounter Diagnoses   Name Primary?    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    Medication Management: Continue current medications. Discussed risks, benefits, and alternatives to treatment plan documented above with patient. I answered all patient questions related to this plan, and patient expressed understanding and agreement.      Follow up in about 2 weeks (around 10/27/2023) for Medication follow up.     Medication List with Changes/Refills   New Medications    RISPERIDONE (RISPERDAL) 1 MG TABLET    Take 1 tablet (1 mg total) by mouth 2 (two) times daily.   Current Medications    FOLIC ACID (FOLVITE) 1 MG TABLET    Take 1 tablet (1 mg total) by mouth once daily.    LEVETIRACETAM (KEPPRA) 1000 MG TABLET    Take 1 tablet (1,000 mg total) by mouth 2 (two) times daily.   Changed and/or Refilled Medications    Modified Medication Previous Medication    METOPROLOL TARTRATE (LOPRESSOR) 25 MG TABLET metoprolol tartrate (LOPRESSOR) 25 MG tablet       Take 1 tablet (25 mg total) by mouth 2 (two) times daily.    Take 1 tablet (25 mg total) by mouth 2 (two) times daily.   Discontinued Medications    DIVALPROEX (DEPAKOTE SPRINKLE) 125 MG CAPSULE    Take 1 capsule (125 mg total) by mouth 2 (two) times a day.    RISPERIDONE (RISPERDAL M-TABS) 3 MG DISINTEGRATING TABLET    Take 1 tablet (3 mg total) by mouth 2 (two) times a day.    TEMAZEPAM (RESTORIL) 7.5 MG CAP    Take 15 mg by mouth nightly as needed.        Time spent with pt including note preparation: 70 minutes     Grace Rosas, PhD, MP  Medical Psychologist

## 2023-10-20 ENCOUNTER — LAB VISIT (OUTPATIENT)
Dept: LAB | Facility: HOSPITAL | Age: 38
End: 2023-10-20
Attending: INTERNAL MEDICINE
Payer: COMMERCIAL

## 2023-10-20 ENCOUNTER — TELEPHONE (OUTPATIENT)
Dept: INTERNAL MEDICINE | Facility: CLINIC | Age: 38
End: 2023-10-20
Payer: COMMERCIAL

## 2023-10-20 ENCOUNTER — OFFICE VISIT (OUTPATIENT)
Dept: INTERNAL MEDICINE | Facility: CLINIC | Age: 38
End: 2023-10-20
Payer: COMMERCIAL

## 2023-10-20 VITALS
HEART RATE: 70 BPM | OXYGEN SATURATION: 100 % | HEIGHT: 61 IN | SYSTOLIC BLOOD PRESSURE: 114 MMHG | BODY MASS INDEX: 22.2 KG/M2 | TEMPERATURE: 98 F | DIASTOLIC BLOOD PRESSURE: 68 MMHG

## 2023-10-20 DIAGNOSIS — H53.9 VISION DISTURBANCE: ICD-10-CM

## 2023-10-20 DIAGNOSIS — R53.81 DEBILITY: ICD-10-CM

## 2023-10-20 DIAGNOSIS — Z00.00 ANNUAL PHYSICAL EXAM: Primary | ICD-10-CM

## 2023-10-20 DIAGNOSIS — Z00.00 ANNUAL PHYSICAL EXAM: ICD-10-CM

## 2023-10-20 DIAGNOSIS — I10 ESSENTIAL HYPERTENSION: ICD-10-CM

## 2023-10-20 DIAGNOSIS — G93.41 METABOLIC ENCEPHALOPATHY: ICD-10-CM

## 2023-10-20 LAB
ALBUMIN SERPL BCP-MCNC: 3.9 G/DL (ref 3.5–5.2)
ALP SERPL-CCNC: 67 U/L (ref 55–135)
ALT SERPL W/O P-5'-P-CCNC: 12 U/L (ref 10–44)
ANION GAP SERPL CALC-SCNC: 11 MMOL/L (ref 8–16)
AST SERPL-CCNC: 15 U/L (ref 10–40)
BASOPHILS # BLD AUTO: 0.02 K/UL (ref 0–0.2)
BASOPHILS NFR BLD: 0.5 % (ref 0–1.9)
BILIRUB SERPL-MCNC: 0.5 MG/DL (ref 0.1–1)
BUN SERPL-MCNC: 11 MG/DL (ref 6–20)
CALCIUM SERPL-MCNC: 9.7 MG/DL (ref 8.7–10.5)
CHLORIDE SERPL-SCNC: 105 MMOL/L (ref 95–110)
CHOLEST SERPL-MCNC: 179 MG/DL (ref 120–199)
CHOLEST/HDLC SERPL: 5.1 {RATIO} (ref 2–5)
CO2 SERPL-SCNC: 25 MMOL/L (ref 23–29)
CREAT SERPL-MCNC: 0.8 MG/DL (ref 0.5–1.4)
DIFFERENTIAL METHOD: ABNORMAL
EOSINOPHIL # BLD AUTO: 0 K/UL (ref 0–0.5)
EOSINOPHIL NFR BLD: 0.5 % (ref 0–8)
ERYTHROCYTE [DISTWIDTH] IN BLOOD BY AUTOMATED COUNT: 15.6 % (ref 11.5–14.5)
EST. GFR  (NO RACE VARIABLE): >60 ML/MIN/1.73 M^2
GLUCOSE SERPL-MCNC: 90 MG/DL (ref 70–110)
HCT VFR BLD AUTO: 38.2 % (ref 37–48.5)
HDLC SERPL-MCNC: 35 MG/DL (ref 40–75)
HDLC SERPL: 19.6 % (ref 20–50)
HGB BLD-MCNC: 12.2 G/DL (ref 12–16)
IMM GRANULOCYTES # BLD AUTO: 0.01 K/UL (ref 0–0.04)
IMM GRANULOCYTES NFR BLD AUTO: 0.2 % (ref 0–0.5)
LDLC SERPL CALC-MCNC: 127 MG/DL (ref 63–159)
LYMPHOCYTES # BLD AUTO: 1.5 K/UL (ref 1–4.8)
LYMPHOCYTES NFR BLD: 37.6 % (ref 18–48)
MCH RBC QN AUTO: 25.8 PG (ref 27–31)
MCHC RBC AUTO-ENTMCNC: 31.9 G/DL (ref 32–36)
MCV RBC AUTO: 81 FL (ref 82–98)
MONOCYTES # BLD AUTO: 0.4 K/UL (ref 0.3–1)
MONOCYTES NFR BLD: 8.5 % (ref 4–15)
NEUTROPHILS # BLD AUTO: 2.2 K/UL (ref 1.8–7.7)
NEUTROPHILS NFR BLD: 52.7 % (ref 38–73)
NONHDLC SERPL-MCNC: 144 MG/DL
NRBC BLD-RTO: 0 /100 WBC
PLATELET # BLD AUTO: 237 K/UL (ref 150–450)
PMV BLD AUTO: 10.8 FL (ref 9.2–12.9)
POTASSIUM SERPL-SCNC: 4.3 MMOL/L (ref 3.5–5.1)
PROT SERPL-MCNC: 7.2 G/DL (ref 6–8.4)
RBC # BLD AUTO: 4.73 M/UL (ref 4–5.4)
SODIUM SERPL-SCNC: 141 MMOL/L (ref 136–145)
TRIGL SERPL-MCNC: 85 MG/DL (ref 30–150)
TSH SERPL DL<=0.005 MIU/L-ACNC: 2.05 UIU/ML (ref 0.4–4)
WBC # BLD AUTO: 4.1 K/UL (ref 3.9–12.7)

## 2023-10-20 PROCEDURE — 36415 COLL VENOUS BLD VENIPUNCTURE: CPT | Performed by: INTERNAL MEDICINE

## 2023-10-20 PROCEDURE — 99999 PR PBB SHADOW E&M-EST. PATIENT-LVL IV: CPT | Mod: PBBFAC,,, | Performed by: INTERNAL MEDICINE

## 2023-10-20 PROCEDURE — 1159F MED LIST DOCD IN RCRD: CPT | Mod: CPTII,S$GLB,, | Performed by: INTERNAL MEDICINE

## 2023-10-20 PROCEDURE — 3074F SYST BP LT 130 MM HG: CPT | Mod: CPTII,S$GLB,, | Performed by: INTERNAL MEDICINE

## 2023-10-20 PROCEDURE — 1160F PR REVIEW ALL MEDS BY PRESCRIBER/CLIN PHARMACIST DOCUMENTED: ICD-10-PCS | Mod: CPTII,S$GLB,, | Performed by: INTERNAL MEDICINE

## 2023-10-20 PROCEDURE — 85025 COMPLETE CBC W/AUTO DIFF WBC: CPT | Performed by: INTERNAL MEDICINE

## 2023-10-20 PROCEDURE — 3074F PR MOST RECENT SYSTOLIC BLOOD PRESSURE < 130 MM HG: ICD-10-PCS | Mod: CPTII,S$GLB,, | Performed by: INTERNAL MEDICINE

## 2023-10-20 PROCEDURE — 99395 PREV VISIT EST AGE 18-39: CPT | Mod: S$GLB,,, | Performed by: INTERNAL MEDICINE

## 2023-10-20 PROCEDURE — 3008F BODY MASS INDEX DOCD: CPT | Mod: CPTII,S$GLB,, | Performed by: INTERNAL MEDICINE

## 2023-10-20 PROCEDURE — 3044F PR MOST RECENT HEMOGLOBIN A1C LEVEL <7.0%: ICD-10-PCS | Mod: CPTII,S$GLB,, | Performed by: INTERNAL MEDICINE

## 2023-10-20 PROCEDURE — 80061 LIPID PANEL: CPT | Performed by: INTERNAL MEDICINE

## 2023-10-20 PROCEDURE — 1159F PR MEDICATION LIST DOCUMENTED IN MEDICAL RECORD: ICD-10-PCS | Mod: CPTII,S$GLB,, | Performed by: INTERNAL MEDICINE

## 2023-10-20 PROCEDURE — 1160F RVW MEDS BY RX/DR IN RCRD: CPT | Mod: CPTII,S$GLB,, | Performed by: INTERNAL MEDICINE

## 2023-10-20 PROCEDURE — 3078F PR MOST RECENT DIASTOLIC BLOOD PRESSURE < 80 MM HG: ICD-10-PCS | Mod: CPTII,S$GLB,, | Performed by: INTERNAL MEDICINE

## 2023-10-20 PROCEDURE — 84443 ASSAY THYROID STIM HORMONE: CPT | Performed by: INTERNAL MEDICINE

## 2023-10-20 PROCEDURE — 99999 PR PBB SHADOW E&M-EST. PATIENT-LVL IV: ICD-10-PCS | Mod: PBBFAC,,, | Performed by: INTERNAL MEDICINE

## 2023-10-20 PROCEDURE — 3008F PR BODY MASS INDEX (BMI) DOCUMENTED: ICD-10-PCS | Mod: CPTII,S$GLB,, | Performed by: INTERNAL MEDICINE

## 2023-10-20 PROCEDURE — 99395 PR PREVENTIVE VISIT,EST,18-39: ICD-10-PCS | Mod: S$GLB,,, | Performed by: INTERNAL MEDICINE

## 2023-10-20 PROCEDURE — 80053 COMPREHEN METABOLIC PANEL: CPT | Performed by: INTERNAL MEDICINE

## 2023-10-20 PROCEDURE — 3078F DIAST BP <80 MM HG: CPT | Mod: CPTII,S$GLB,, | Performed by: INTERNAL MEDICINE

## 2023-10-20 PROCEDURE — 3044F HG A1C LEVEL LT 7.0%: CPT | Mod: CPTII,S$GLB,, | Performed by: INTERNAL MEDICINE

## 2023-10-20 RX ORDER — METOPROLOL TARTRATE 25 MG/1
25 TABLET, FILM COATED ORAL 2 TIMES DAILY
Qty: 180 TABLET | Refills: 3 | Status: SHIPPED | OUTPATIENT
Start: 2023-10-20

## 2023-10-20 NOTE — TELEPHONE ENCOUNTER
Pt needs a referral to Neurological Pathobiologist.  Unsure of how to pend referral please advise so pt can come see you.

## 2023-10-20 NOTE — PROGRESS NOTES
Shania Tapia  38 y.o. Black or  female  [id number]    Chief Complaint:  Chief Complaint   Patient presents with    Annual Exam       History of Present Illness:  Presents for an annual exam.   She is here with her  and mother.   She remains in physical therapy but unable to walk. She is in a wheelchair.   She recently saw neurology at the [facility]. She is due to follow up with neurology to review recent EEG.   She continues to have visual disturbance. She has seen ophthalmology but wants to see a neuro-ophthalmologist.   HTN--stable on metoprolol.     Laboratory  Lab Results   Component Value Date    WBC 2.62 (L) 07/11/2023    HGB 9.4 (L) 07/11/2023    HCT 31.4 (L) 07/11/2023     07/11/2023    CHOL 201 (H) 10/20/2022    TRIG 76 10/20/2022    HDL 51 10/20/2022    ALT 41 (H) 07/02/2023    AST 42 (H) 07/02/2023     07/11/2023    K 3.5 07/11/2023     07/11/2023    CREATININE 0.76 07/11/2023    BUN 17 07/11/2023    CO2 28 07/11/2023    TSH 1.259 06/13/2023    HGBA1C 5.5 06/14/2023     Review of Systems   Eyes:  Positive for blurred vision.   Respiratory:  Negative for shortness of breath.    Cardiovascular:  Negative for chest pain and leg swelling.   Gastrointestinal:  Negative for abdominal pain.   Neurological:  Positive for weakness. Negative for dizziness and headaches.       The following were reviewed: Active problem list, medication list, allergies, family history, social history, and Health Maintenance.     History:  Past Medical History:   Diagnosis Date    Abnormal Pap smear     Abnormal Pap smear of vagina     Anemia     Anxiety     High serum osmolar gap 6/15/2023    Hypertension     Visual hallucination 6/15/2023     Past Surgical History:   Procedure Laterality Date    CERVICAL BIOPSY      Conization      Family History   Problem Relation Age of Onset    Diabetes Paternal Grandmother     Hypertension Father     Heart attack Father     Breast cancer  Neg Hx     Colon cancer Neg Hx     Ovarian cancer Neg Hx      Social History     Socioeconomic History    Marital status:    Tobacco Use    Smoking status: Never    Smokeless tobacco: Never   Substance and Sexual Activity    Alcohol use: No    Drug use: No    Sexual activity: Yes     Partners: Male     Birth control/protection: None     Social Determinants of Health     Financial Resource Strain: Low Risk  (6/14/2023)    Overall Financial Resource Strain (CARDIA)     Difficulty of Paying Living Expenses: Not hard at all   Food Insecurity: No Food Insecurity (6/14/2023)    Hunger Vital Sign     Worried About Running Out of Food in the Last Year: Never true     Ran Out of Food in the Last Year: Never true   Transportation Needs: No Transportation Needs (6/14/2023)    PRAPARE - Transportation     Lack of Transportation (Medical): No     Lack of Transportation (Non-Medical): No   Physical Activity: Insufficiently Active (6/14/2023)    Exercise Vital Sign     Days of Exercise per Week: 2 days     Minutes of Exercise per Session: 20 min   Stress: Stress Concern Present (10/19/2022)    [employer] [place] [employer] - Occupational Stress Questionnaire     Feeling of Stress : To some extent   Social Connections: Unknown (10/19/2022)    Social Connection and Isolation Panel [NHANES]     Frequency of Communication with Friends and Family: More than three times a week     Frequency of Social Gatherings with Friends and Family: More than three times a week     Active Member of Clubs or Organizations: No     Attends Club or Organization Meetings: Patient refused     Marital Status:    Housing Stability: Low Risk  (6/14/2023)    Housing Stability Vital Sign     Unable to Pay for Housing in the Last Year: No     Number of Places Lived in the Last Year: 1     Unstable Housing in the Last Year: No     Patient Active Problem List   Diagnosis    Encephalopathy    Hallucinations    Methanol poisoning    Status  epilepticus    Microcytic anemia     Review of patient's allergies indicates:   Allergen Reactions    Latex Rash    Latex, natural rubber Rash       Health Maintenance  Health Maintenance Topics with due status: Not Due       Topic Last Completion Date    TETANUS VACCINE 10/07/2018    Cervical Cancer Screening 08/12/2022     Health Maintenance Due   Topic Date Due    Pneumococcal Vaccines (Age 0-64) (1 - PCV) Never done    Influenza Vaccine (1) 09/01/2023    COVID-19 Vaccine (3 - 2023-24 season) 09/01/2023       Medications:  Current Outpatient Medications on File Prior to Visit   Medication Sig Dispense Refill    levETIRAcetam (KEPPRA) 1000 MG tablet Take 1 tablet (1,000 mg total) by mouth 2 (two) times daily. 60 tablet 11    risperiDONE (RISPERDAL) 2 MG tablet Take 1 tablet (2 mg total) by mouth 2 (two) times daily. 60 tablet 0    [DISCONTINUED] metoprolol tartrate (LOPRESSOR) 25 MG tablet Take 1 tablet (25 mg total) by mouth 2 (two) times daily. 60 tablet 11    folic acid (FOLVITE) 1 MG tablet Take 1 tablet (1 mg total) by mouth once daily. 30 tablet 0     No current facility-administered medications on file prior to visit.       Medications have been reviewed and reconciled with patient at visit today.    Exam:  Vitals:    10/20/23 0751   BP: 114/68   Pulse: 70   Temp: 98.4 °F (36.9 °C)         Body mass index is 22.2 kg/m².      Physical Exam  Vitals reviewed.   Constitutional:       General: She is not in acute distress.     Appearance: She is well-developed. She is not ill-appearing.   Eyes:      General: No scleral icterus.  Cardiovascular:      Rate and Rhythm: Normal rate and regular rhythm.      Heart sounds: Normal heart sounds.   Pulmonary:      Effort: Pulmonary effort is normal. No respiratory distress.      Breath sounds: Normal breath sounds.   Abdominal:      General: Bowel sounds are normal.      Palpations: Abdomen is soft.   Musculoskeletal:      Right lower leg: No edema.      Left lower leg:  No edema.   Skin:     General: Skin is warm and dry.   Neurological:      Mental Status: She is alert and oriented to person, place, and time.   Psychiatric:         Behavior: Behavior normal.       Assessment:  The primary encounter diagnosis was Annual physical exam. Diagnoses of Debility, Metabolic encephalopathy, Vision disturbance, and Essential hypertension were also pertinent to this visit.    Plan:  Annual physical exam  -     Counseled regarding age appropriate screenings and immunizations  -     Counseled regarding lifestyle modifications  -     CBC Auto Differential; Future; Expected date: 10/20/2023  -     Comprehensive Metabolic Panel; Future; Expected date: 10/20/2023  -     TSH; Future  -     Lipid panel; Future; Expected date: 10/20/2023    Debility  -     continue PT    Metabolic encephalopathy  -     f/u with neurology    Vision disturbance  -     will refer to neuro-ophthalmology    Essential hypertension  -     metoprolol tartrate (LOPRESSOR) 25 MG tablet; Take 1 tablet (25 mg total) by mouth 2 (two) times daily.  Dispense: 180 tablet; Refill: 3    Labs today.     Follow up in about 6 months (around 4/20/2024).

## 2023-10-26 NOTE — PROGRESS NOTES
MEDICATION MANAGEMENT SESSION    Name: Shania Tapia  Age: 38 y.o.  : 1985    Preferred Name: Shania    Referring provider: Dr. Flavia Fink    Reason for Visit:  Medication follow up    Summary of Visit:  Pt accompanied by her  (who is pushing pt's wheelchair) and her mother to assist pt and provide information on pt's progress. Pt's mother brought records from pt's encounter at [facility] from 23-23 when she was treated for seizures, hallucinations, secondary to methanol exposure. Pt was observed to display brighter, more spontaneous affect and increased speech fluency. She has not experienced hallucinations since her initial virtual visit on 23 and reports feeling better with taper to Risperdal 2 mg bid for the past 2 weeks. Discussed with pt and her family continued taper to Risperdal 1 mg bid and reviewed associated benefits and risks. She does not report problems with depression aside from fatigue, and her anxiety includes worry related to her condition, recovery, and prognosis but it has been manageable with continued support of her family.    Current Symptoms:   ADHD: denied   Depressive Disorder: tired/fatigued   Anxiety Disorder: fatigue, excessive worry   Panic Disorder: denied   Manic Disorder: denied   Psychotic Disorder: denied   Substance Use:  denied     Review of Systems   Constitutional:  Positive for fatigue. Negative for activity change, appetite change and unexpected weight change.   Respiratory:  Negative for shortness of breath.    Psychiatric/Behavioral:  Negative for agitation, behavioral problems, confusion, decreased concentration, dysphoric mood, self-injury, sleep disturbance and suicidal ideas. The patient is not nervous/anxious and is not hyperactive.       Constitutional:  Vitals:  Most recent vital signs were reviewed.   Last 3 sets of Vitals        2023    10:37 AM 8/3/2023     2:10 PM 10/20/2023     7:51 AM   Vitals - 1  "value per visit   SYSTOLIC  118 114   DIASTOLIC  66 68   Pulse  133 70   Temp  98.1 °F (36.7 °C) 98.4 °F (36.9 °C)   Resp  20    SPO2  98 % 100 %   Height 5' 1" (1.549 m) 5' 1" (1.549 m) 5' 1" (1.549 m)   Pain Score  Zero Zero          Psychiatric:  Oriented: x 3   Attitude: cooperative   Eye Contact: good   Behavior: wnl   Mood: "good"  Affect: appropriate range   Attention: intact   Concentration: grossly intact   Thought Process: goal directed   Speech: intelligible  Volume: WNL   Quantity: WNL   Rhythm: WNL  Insight: fair to good   Threats: no SI / HI   Memory: Grossly intact  Psychosis: denies all   Estimate of Intellectual Function: average   Judgment: fair to good  Relevant Elements of Neurological Exam: uses a wheelchair     Allergy Review:   Review of patient's allergies indicates:   Allergen Reactions    Latex Rash    Latex, natural rubber Rash      Medical Problem List:   Patient Active Problem List   Diagnosis    Encephalopathy    Hallucinations    Methanol poisoning    Status epilepticus    Microcytic anemia      Encounter Diagnoses   Name Primary?    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 Discussed risks, benefits, and alternatives to treatment plan documented above with patient. I answered all patient questions related to this plan, and patient expressed understanding and agreement.      Follow up in about 3 weeks (around 11/17/2023) for Medication follow up.     Medication List with Changes/Refills   Current Medications    FOLIC ACID (FOLVITE) 1 MG TABLET    Take 1 tablet (1 mg total) by mouth once daily.    LEVETIRACETAM (KEPPRA) 1000 MG TABLET    Take 1 tablet (1,000 mg total) by mouth 2 (two) times daily.    METOPROLOL TARTRATE (LOPRESSOR) 25 MG TABLET    Take 1 tablet (25 mg " total) by mouth 2 (two) times daily.   Changed and/or Refilled Medications    Modified Medication Previous Medication    RISPERIDONE (RISPERDAL) 1 MG TABLET risperiDONE (RISPERDAL) 2 MG tablet       Take 1 tablet (1 mg total) by mouth 2 (two) times daily.    Take 1 tablet (2 mg total) by mouth 2 (two) times daily.      Time spent with pt including note preparation: 30 minutes     Grace Rosas, PhD, MP  Medical Psychologist

## 2023-10-27 ENCOUNTER — OFFICE VISIT (OUTPATIENT)
Dept: PSYCHIATRY | Facility: CLINIC | Age: 38
End: 2023-10-27
Payer: COMMERCIAL

## 2023-10-27 DIAGNOSIS — F41.9 ANXIETY DISORDER, UNSPECIFIED TYPE: ICD-10-CM

## 2023-10-27 DIAGNOSIS — F29: ICD-10-CM

## 2023-10-27 DIAGNOSIS — R44.3 HALLUCINATIONS: Primary | ICD-10-CM

## 2023-10-27 DIAGNOSIS — T51.1X4A: ICD-10-CM

## 2023-10-27 PROCEDURE — 1159F MED LIST DOCD IN RCRD: CPT | Mod: CPTII,S$GLB,, | Performed by: PSYCHOLOGIST

## 2023-10-27 PROCEDURE — 99999 PR PBB SHADOW E&M-EST. PATIENT-LVL II: ICD-10-PCS | Mod: PBBFAC,,, | Performed by: PSYCHOLOGIST

## 2023-10-27 PROCEDURE — 99999 PR PBB SHADOW E&M-EST. PATIENT-LVL II: CPT | Mod: PBBFAC,,, | Performed by: PSYCHOLOGIST

## 2023-10-27 PROCEDURE — 3044F HG A1C LEVEL LT 7.0%: CPT | Mod: CPTII,S$GLB,, | Performed by: PSYCHOLOGIST

## 2023-10-27 PROCEDURE — 99214 PR OFFICE/OUTPT VISIT, EST, LEVL IV, 30-39 MIN: ICD-10-PCS | Mod: S$GLB,,, | Performed by: PSYCHOLOGIST

## 2023-10-27 PROCEDURE — 3044F PR MOST RECENT HEMOGLOBIN A1C LEVEL <7.0%: ICD-10-PCS | Mod: CPTII,S$GLB,, | Performed by: PSYCHOLOGIST

## 2023-10-27 PROCEDURE — 1159F PR MEDICATION LIST DOCUMENTED IN MEDICAL RECORD: ICD-10-PCS | Mod: CPTII,S$GLB,, | Performed by: PSYCHOLOGIST

## 2023-10-27 PROCEDURE — 99214 OFFICE O/P EST MOD 30 MIN: CPT | Mod: S$GLB,,, | Performed by: PSYCHOLOGIST

## 2023-10-27 RX ORDER — RISPERIDONE 1 MG/1
1 TABLET ORAL 2 TIMES DAILY
Qty: 60 TABLET | Refills: 0 | Status: SHIPPED | OUTPATIENT
Start: 2023-10-27 | End: 2023-11-13 | Stop reason: SDUPTHER

## 2023-10-30 ENCOUNTER — PATIENT MESSAGE (OUTPATIENT)
Dept: INTERNAL MEDICINE | Facility: CLINIC | Age: 38
End: 2023-10-30
Payer: COMMERCIAL

## 2023-10-30 ENCOUNTER — TELEPHONE (OUTPATIENT)
Dept: PSYCHIATRY | Facility: CLINIC | Age: 38
End: 2023-10-30
Payer: COMMERCIAL

## 2023-10-30 ENCOUNTER — PATIENT MESSAGE (OUTPATIENT)
Dept: PSYCHIATRY | Facility: CLINIC | Age: 38
End: 2023-10-30
Payer: COMMERCIAL

## 2023-11-11 NOTE — PROGRESS NOTES
"MEDICATION MANAGEMENT SESSION: VIRTUAL    Name: Shania Tapia  Age: 38 y.o.  : 1985    Preferred Name: Shania    Referring provider: Dr. Flavia Fink    Reason for Visit:  Medication follow up    Summary of Visit:  Pt reports she has been doing well with taper to Risperdal 1 mg bid. She's had no visual hallucinations and is feeling more herself. Pt's affect was noted to be s/w restricted, but she and her  are tired from being woken up last night with a work-related call for him at 2:45 a.m. last night. She has been napping throughout the day, but her sleep at night has been okay. She has been attending PT twice per week and making progress with walking independently. She remains hopeful regarding prognosis. Discussed further taper to Risperdal 1 mg qd.    Current Symptoms:   ADHD: denied   Depressive Disorder: tired/fatigued   Anxiety Disorder: fatigue, sleep problems   Panic Disorder: denied   Manic Disorder: denied   Psychotic Disorder: denied   Substance Use:  denied     Review of Systems   Constitutional:  Positive for fatigue. Negative for activity change, appetite change and unexpected weight change.   Respiratory:  Negative for shortness of breath.    Psychiatric/Behavioral:  Positive for sleep disturbance. Negative for agitation, behavioral problems, confusion, decreased concentration, dysphoric mood, hallucinations, self-injury and suicidal ideas. The patient is nervous/anxious. The patient is not hyperactive.       Constitutional:  Vitals:  Most recent vital signs were reviewed.   Last 3 sets of Vitals        2023    10:37 AM 8/3/2023     2:10 PM 10/20/2023     7:51 AM   Vitals - 1 value per visit   SYSTOLIC  118 114   DIASTOLIC  66 68   Pulse  133 70   Temp  98.1 °F (36.7 °C) 98.4 °F (36.9 °C)   Resp  20    SPO2  98 % 100 %   Height 5' 1" (1.549 m) 5' 1" (1.549 m) 5' 1" (1.549 m)   Pain Score  Zero Zero          Psychiatric:  Oriented: x 3   Attitude: cooperative   Eye " "Contact: good   Behavior: wnl   Mood: "good"  Affect: appropriate range   Attention: intact   Concentration: grossly intact   Thought Process: goal directed   Speech: intelligible  Volume: WNL   Quantity: WNL   Rhythm: WNL  Insight: fair to good   Threats: no SI / HI   Memory: Grossly intact  Psychosis: denies all   Estimate of Intellectual Function: average   Judgment: good  Relevant Elements of Neurological Exam: uses a wheelchair     Allergy Review:   Review of patient's allergies indicates:   Allergen Reactions    Latex Rash    Latex, natural rubber Rash      Medical Problem List:   Patient Active Problem List   Diagnosis    Encephalopathy    Hallucinations    Methanol poisoning    Status epilepticus    Microcytic anemia      Encounter Diagnoses   Name Primary?    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 Discussed risks, benefits, and alternatives to treatment plan documented above with patient. I answered all patient questions related to this plan, and patient expressed understanding and agreement.      Follow up in about 4 weeks (around 12/11/2023) for Medication follow up.     Medication List with Changes/Refills   Current Medications    FOLIC ACID (FOLVITE) 1 MG TABLET    Take 1 tablet (1 mg total) by mouth once daily.    LEVETIRACETAM (KEPPRA) 1000 MG TABLET    Take 1 tablet (1,000 mg total) by mouth 2 (two) times daily.    METOPROLOL TARTRATE (LOPRESSOR) 25 MG TABLET    Take 1 tablet (25 mg total) by mouth 2 (two) times daily.   Changed and/or Refilled Medications    Modified Medication Previous Medication    RISPERIDONE (RISPERDAL) 1 MG TABLET risperiDONE (RISPERDAL) 1 MG tablet       Take 1 tablet (1 mg total) by mouth once daily.    Take 1 tablet (1 mg total) by mouth 2 (two) times " daily.      Time spent with pt including note preparation: 30 minutes     Grace Rosas, PhD, MP  Medical Psychologist

## 2023-11-13 ENCOUNTER — OFFICE VISIT (OUTPATIENT)
Dept: PSYCHIATRY | Facility: CLINIC | Age: 38
End: 2023-11-13
Payer: COMMERCIAL

## 2023-11-13 DIAGNOSIS — T51.1X4A: ICD-10-CM

## 2023-11-13 DIAGNOSIS — F41.9 ANXIETY DISORDER, UNSPECIFIED TYPE: ICD-10-CM

## 2023-11-13 DIAGNOSIS — F29: Primary | ICD-10-CM

## 2023-11-13 DIAGNOSIS — R44.3 HALLUCINATIONS: ICD-10-CM

## 2023-11-13 PROCEDURE — 1159F MED LIST DOCD IN RCRD: CPT | Mod: CPTII,95,, | Performed by: PSYCHOLOGIST

## 2023-11-13 PROCEDURE — 3044F HG A1C LEVEL LT 7.0%: CPT | Mod: CPTII,95,, | Performed by: PSYCHOLOGIST

## 2023-11-13 PROCEDURE — 3044F PR MOST RECENT HEMOGLOBIN A1C LEVEL <7.0%: ICD-10-PCS | Mod: CPTII,95,, | Performed by: PSYCHOLOGIST

## 2023-11-13 PROCEDURE — 99214 OFFICE O/P EST MOD 30 MIN: CPT | Mod: 95,,, | Performed by: PSYCHOLOGIST

## 2023-11-13 PROCEDURE — 99214 PR OFFICE/OUTPT VISIT, EST, LEVL IV, 30-39 MIN: ICD-10-PCS | Mod: 95,,, | Performed by: PSYCHOLOGIST

## 2023-11-13 PROCEDURE — 1159F PR MEDICATION LIST DOCUMENTED IN MEDICAL RECORD: ICD-10-PCS | Mod: CPTII,95,, | Performed by: PSYCHOLOGIST

## 2023-11-13 RX ORDER — RISPERIDONE 1 MG/1
1 TABLET ORAL DAILY
Qty: 30 TABLET | Refills: 0 | Status: SHIPPED | OUTPATIENT
Start: 2023-11-13 | End: 2023-12-06 | Stop reason: SDUPTHER

## 2023-11-16 ENCOUNTER — PATIENT MESSAGE (OUTPATIENT)
Dept: PSYCHIATRY | Facility: CLINIC | Age: 38
End: 2023-11-16
Payer: COMMERCIAL

## 2023-12-05 DIAGNOSIS — R44.3 HALLUCINATIONS: ICD-10-CM

## 2023-12-06 DIAGNOSIS — R44.3 HALLUCINATIONS: ICD-10-CM

## 2023-12-06 RX ORDER — RISPERIDONE 1 MG/1
1 TABLET ORAL
Qty: 90 TABLET | Refills: 1 | OUTPATIENT
Start: 2023-12-06

## 2023-12-06 RX ORDER — RISPERIDONE 1 MG/1
1 TABLET ORAL DAILY
Qty: 90 TABLET | Refills: 0 | Status: SHIPPED | OUTPATIENT
Start: 2023-12-06 | End: 2024-03-05

## 2023-12-11 ENCOUNTER — OFFICE VISIT (OUTPATIENT)
Dept: OPHTHALMOLOGY | Facility: CLINIC | Age: 38
End: 2023-12-11
Payer: COMMERCIAL

## 2023-12-11 DIAGNOSIS — T51.1X1D: ICD-10-CM

## 2023-12-11 DIAGNOSIS — H46.3 TOXIC OPTIC NEUROPATHY: ICD-10-CM

## 2023-12-11 DIAGNOSIS — H53.413 CENTRAL SCOTOMA, BOTH EYES: Primary | ICD-10-CM

## 2023-12-11 PROCEDURE — 99203 PR OFFICE/OUTPT VISIT, NEW, LEVL III, 30-44 MIN: ICD-10-PCS | Mod: S$GLB,,, | Performed by: OPHTHALMOLOGY

## 2023-12-11 PROCEDURE — 3044F PR MOST RECENT HEMOGLOBIN A1C LEVEL <7.0%: ICD-10-PCS | Mod: CPTII,S$GLB,, | Performed by: OPHTHALMOLOGY

## 2023-12-11 PROCEDURE — 3044F HG A1C LEVEL LT 7.0%: CPT | Mod: CPTII,S$GLB,, | Performed by: OPHTHALMOLOGY

## 2023-12-11 PROCEDURE — 1159F MED LIST DOCD IN RCRD: CPT | Mod: CPTII,S$GLB,, | Performed by: OPHTHALMOLOGY

## 2023-12-11 PROCEDURE — 99999 PR PBB SHADOW E&M-EST. PATIENT-LVL III: ICD-10-PCS | Mod: PBBFAC,,, | Performed by: OPHTHALMOLOGY

## 2023-12-11 PROCEDURE — 92133 POSTERIOR SEGMENT OCT OPTIC NERVE(OCULAR COHERENCE TOMOGRAPHY) - OU - BOTH EYES: ICD-10-PCS | Mod: S$GLB,,, | Performed by: OPHTHALMOLOGY

## 2023-12-11 PROCEDURE — 92133 CPTRZD OPH DX IMG PST SGM ON: CPT | Mod: S$GLB,,, | Performed by: OPHTHALMOLOGY

## 2023-12-11 PROCEDURE — 1160F PR REVIEW ALL MEDS BY PRESCRIBER/CLIN PHARMACIST DOCUMENTED: ICD-10-PCS | Mod: CPTII,S$GLB,, | Performed by: OPHTHALMOLOGY

## 2023-12-11 PROCEDURE — 1160F RVW MEDS BY RX/DR IN RCRD: CPT | Mod: CPTII,S$GLB,, | Performed by: OPHTHALMOLOGY

## 2023-12-11 PROCEDURE — 1159F PR MEDICATION LIST DOCUMENTED IN MEDICAL RECORD: ICD-10-PCS | Mod: CPTII,S$GLB,, | Performed by: OPHTHALMOLOGY

## 2023-12-11 PROCEDURE — 99203 OFFICE O/P NEW LOW 30 MIN: CPT | Mod: S$GLB,,, | Performed by: OPHTHALMOLOGY

## 2023-12-11 PROCEDURE — 99999 PR PBB SHADOW E&M-EST. PATIENT-LVL III: CPT | Mod: PBBFAC,,, | Performed by: OPHTHALMOLOGY

## 2023-12-11 NOTE — LETTER
[facility] [address]  [address]  [place] [address]  Phone: [phone]  Fax: [phone]   December 11, 2023    Flavia Fink [place]  [address] [place] [id number]    Patient: Shania Tapia   MR Number: [id number]   YOB: 1985   Date of Visit: 12/11/2023       Dear Dr. Fink:    Thank you for referring Shania Tapia to me for evaluation. Here is my assessment and plan of care:    Assessment/Plan    For exam results, see Encounter Report.    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 return to useful vision. I recommended rehabilitation at the [facility] in [place]. She will use artificial tears to keep her eyes lubricated. She will return to me as needed..          Below you will find my full exam findings. If you have questions, please do not hesitate to call me. I look forward to following Ms. Shania Tapia along with you.    Sincerely,          Santi Velasco MD       CC  No Recipients             Base Eye Exam       Visual Acuity (Snellen - Linear)         Right Left    Dist sc CF at 3' CF at 3'              Tonometry (Tonopen, 11:11 AM)         Right Left    Pressure 11 13              Pupils         Dark Light Shape React APD    Right 3 3 Round Minimal +4    Left 3 3 Round Minimal +4              Visual Fields (Counting fingers)         Right Left     Full Full              Extraocular Movement         Right Left     Full, Ortho Full, Ortho              Neuro/Psych       Oriented x3: Yes    Mood/Affect: Normal              Dilation       Both eyes: 2.5% Phenylephrine, 1% Mydriacyl @ 11:06 AM                  Slit Lamp and Fundus Exam        External Exam         Right Left    External Normal Normal              Slit Lamp Exam         Right Left    Lids/Lashes Normal Normal    Conjunctiva/Sclera White and quiet White and quiet    Cornea Clear Clear    Anterior Chamber Deep and quiet Deep and quiet    Iris Round and reactive Round and reactive    Lens Clear Clear    Vitreous Normal Normal              Fundus Exam         Right Left    Disc 4+ Pallor 4+ Pallor    C/D Ratio 0.4 0.4    Macula Normal Normal    Vessels Normal Normal    Periphery Normal Normal

## 2023-12-11 NOTE — PROGRESS NOTES
HPI    Referred by Dr.Angela Fink DO  Patient here w/ and mother.  Hx of Methyl alcohol causing toxic effect since 6/23.  Pt has limited vision since poisoning.  No eye pain or headaches.     I have personally interviewed the patient, reviewed the history and   examined the patient and agree with the technician's exam.  Last edited by Santi Velasco MD on 12/11/2023 10:39 AM.            Assessment /Plan     For exam results, see Encounter Report.    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 return to useful vision. I recommended rehabilitation at the [facility] in [place]. She will use artificial tears to keep her eyes lubricated. She will return to me as needed..

## 2023-12-11 NOTE — PATIENT INSTRUCTIONS
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 return to useful vision. I recommended rehabilitation at the [facility] in [place]. She will use artificial tears to keep her eyes lubricated. She will return to me as needed..

## 2023-12-14 NOTE — PROGRESS NOTES
"MEDICATION MANAGEMENT SESSION: VIRTUAL    Name: Shania Tapia  Age: 38 y.o.  : 1985    Preferred Name: Shania    Referring provider: Dr. Flavia Fink    Reason for Visit:  Medication follow up    Summary of Visit:  Pt accompanied by her  to virtual visit. She reports no visual hallucinations since last visit and continued taper of Risperdal to 1 mg bid last visit. Her affect appeared brighter and more spontaneous, and she reports some improvement in energy and hope regarding her recovery. Pt's  and family continue to be great support for pt. She reports some continued anxiety/worry regarding prognosis but is optimistic after completing phase of PT. She has been sleeping fine. She plans to spend holidays with her family.    Current Symptoms:   ADHD: denied   Depressive Disorder: tired/fatigued   Anxiety Disorder: anxiety/nervousness, fatigue   Panic Disorder: denied   Manic Disorder: denied   Psychotic Disorder: denied   Substance Use:  denied     Review of Systems   Constitutional:  Positive for fatigue. Negative for activity change, appetite change and unexpected weight change.   Respiratory:  Negative for shortness of breath.    Psychiatric/Behavioral:  Negative for agitation, behavioral problems, confusion, decreased concentration, dysphoric mood, hallucinations, self-injury, sleep disturbance and suicidal ideas. The patient is nervous/anxious. The patient is not hyperactive.       Constitutional:  Vitals:  Most recent vital signs were reviewed.   Last 3 sets of Vitals        8/3/2023     2:10 PM 10/20/2023     7:51 AM 2023    10:34 AM   Vitals - 1 value per visit   SYSTOLIC 118 114    DIASTOLIC 66 68    Pulse 133 70    Temp 98.1 °F (36.7 °C) 98.4 °F (36.9 °C)    Resp 20     SPO2 98 % 100 %    Height 5' 1" (1.549 m) 5' 1" (1.549 m)    Pain Score Zero Zero Zero          Psychiatric:  Oriented: x 3   Attitude: cooperative   Eye Contact: good   Behavior: wnl   Mood: " ""good"  Affect: appropriate range   Attention: intact   Concentration: grossly intact   Thought Process: goal directed   Speech: intelligible  Volume: WNL   Quantity: WNL   Rhythm: WNL  Insight: fair to good   Threats: no SI / HI   Memory: Grossly intact  Psychosis: denies all   Estimate of Intellectual Function: average   Judgment: fair to good   Relevant Elements of Neurological Exam: needs assistance with gait     Allergy Review:   Review of patient's allergies indicates:   Allergen Reactions    Latex Rash    Latex, natural rubber Rash      Medical Problem List:   Patient Active Problem List   Diagnosis    Encephalopathy    Hallucinations    Methanol poisoning    Status epilepticus    Microcytic anemia      Encounter Diagnoses   Name Primary?    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 Discussed risks, benefits, and alternatives to treatment plan documented above with patient. I answered all patient questions related to this plan, and patient expressed understanding and agreement.      Follow up in about 4 weeks (around 1/12/2024) for Medication follow up.     Medication List with Changes/Refills   Current Medications    FOLIC ACID (FOLVITE) 1 MG TABLET    Take 1 tablet (1 mg total) by mouth once daily.    LEVETIRACETAM (KEPPRA) 1000 MG TABLET    Take 1 tablet (1,000 mg total) by mouth 2 (two) times daily.    METOPROLOL TARTRATE (LOPRESSOR) 25 MG TABLET    Take 1 tablet (25 mg total) by mouth 2 (two) times daily.    RISPERIDONE (RISPERDAL) 1 MG TABLET    Take 1 tablet (1 mg total) by mouth once daily.      Time spent with pt including note preparation: 30 minutes     Grace Rosas, PhD, MP  Medical Psychologist    "

## 2023-12-15 ENCOUNTER — OFFICE VISIT (OUTPATIENT)
Dept: PSYCHIATRY | Facility: CLINIC | Age: 38
End: 2023-12-15
Payer: COMMERCIAL

## 2023-12-15 DIAGNOSIS — F41.9 ANXIETY DISORDER, UNSPECIFIED TYPE: ICD-10-CM

## 2023-12-15 DIAGNOSIS — F29: ICD-10-CM

## 2023-12-15 DIAGNOSIS — R44.3 HALLUCINATIONS: ICD-10-CM

## 2023-12-15 DIAGNOSIS — T51.1X4A: Primary | ICD-10-CM

## 2023-12-15 PROCEDURE — 3044F HG A1C LEVEL LT 7.0%: CPT | Mod: CPTII,95,, | Performed by: PSYCHOLOGIST

## 2023-12-15 PROCEDURE — 99214 PR OFFICE/OUTPT VISIT, EST, LEVL IV, 30-39 MIN: ICD-10-PCS | Mod: 95,,, | Performed by: PSYCHOLOGIST

## 2023-12-15 PROCEDURE — 3044F PR MOST RECENT HEMOGLOBIN A1C LEVEL <7.0%: ICD-10-PCS | Mod: CPTII,95,, | Performed by: PSYCHOLOGIST

## 2023-12-15 PROCEDURE — 1159F PR MEDICATION LIST DOCUMENTED IN MEDICAL RECORD: ICD-10-PCS | Mod: CPTII,95,, | Performed by: PSYCHOLOGIST

## 2023-12-15 PROCEDURE — 99214 OFFICE O/P EST MOD 30 MIN: CPT | Mod: 95,,, | Performed by: PSYCHOLOGIST

## 2023-12-15 PROCEDURE — 1159F MED LIST DOCD IN RCRD: CPT | Mod: CPTII,95,, | Performed by: PSYCHOLOGIST

## 2024-01-04 ENCOUNTER — PATIENT MESSAGE (OUTPATIENT)
Dept: INTERNAL MEDICINE | Facility: CLINIC | Age: 39
End: 2024-01-04
Payer: COMMERCIAL

## 2024-01-04 DIAGNOSIS — R53.81 DEBILITY: Primary | ICD-10-CM

## 2024-01-04 DIAGNOSIS — Z74.09 IMPAIRED MOBILITY: ICD-10-CM

## 2024-02-27 ENCOUNTER — PATIENT MESSAGE (OUTPATIENT)
Dept: OPHTHALMOLOGY | Facility: CLINIC | Age: 39
End: 2024-02-27
Payer: COMMERCIAL

## 2024-02-29 ENCOUNTER — PATIENT MESSAGE (OUTPATIENT)
Dept: OPHTHALMOLOGY | Facility: CLINIC | Age: 39
End: 2024-02-29
Payer: COMMERCIAL

## 2024-03-06 ENCOUNTER — TELEPHONE (OUTPATIENT)
Dept: OPHTHALMOLOGY | Facility: CLINIC | Age: 39
End: 2024-03-06
Payer: COMMERCIAL

## 2024-03-06 NOTE — TELEPHONE ENCOUNTER
----- Message from Sol Ellison MA sent at 3/5/2024  4:15 PM CST -----  Contact: # [phone] Siomara Paris    ----- Message -----  From: Rio Aparicio  Sent: 3/5/2024   4:01 PM CST  To: Rod Hancock Staff    [facility] is calling and states they faxed over a request for the visual acuity and referral for services from [facility]. She states she just keeps getting back an empty cover sheet from us. Please fax again or email to [email or web address].

## 2024-03-08 ENCOUNTER — PATIENT MESSAGE (OUTPATIENT)
Dept: OPHTHALMOLOGY | Facility: CLINIC | Age: 39
End: 2024-03-08
Payer: COMMERCIAL

## 2024-09-30 ENCOUNTER — PATIENT MESSAGE (OUTPATIENT)
Dept: INTERNAL MEDICINE | Facility: CLINIC | Age: 39
End: 2024-09-30
Payer: COMMERCIAL

## 2024-09-30 DIAGNOSIS — H47.099 DISORDER OF OPTIC NERVE, UNSPECIFIED LATERALITY: ICD-10-CM

## 2024-09-30 DIAGNOSIS — H53.9 VISION DISTURBANCE: Primary | ICD-10-CM

## 2024-10-07 NOTE — TELEPHONE ENCOUNTER
Patient asking an external order to see Neuro-Ophthalmologist, Violet Puentes MD for Optic nerve damage both eyes. Please advise.

## 2024-10-10 ENCOUNTER — TELEPHONE (OUTPATIENT)
Dept: OPHTHALMOLOGY | Facility: CLINIC | Age: 39
End: 2024-10-10
Payer: COMMERCIAL

## 2024-10-10 NOTE — TELEPHONE ENCOUNTER
----- Message from Jennifer sent at 10/9/2024 11:23 AM CDT -----  Regarding: Referral Scheduling  Scheduling Request           Appt Type:Np     Date/Time Preference:First Available     Treating Provider:     Caller Name:Keira      Contact Preference: [phone]     Comments/notes:Patients mother called about scheduling pt from referral in chart.

## 2024-10-13 DIAGNOSIS — I10 ESSENTIAL HYPERTENSION: ICD-10-CM

## 2024-10-13 RX ORDER — METOPROLOL TARTRATE 25 MG/1
25 TABLET, FILM COATED ORAL 2 TIMES DAILY
Qty: 180 TABLET | Refills: 0 | Status: SHIPPED | OUTPATIENT
Start: 2024-10-13

## 2024-10-13 NOTE — TELEPHONE ENCOUNTER
Refill Decision Note   Shania Tapia  is requesting a refill authorization.  Brief Assessment and Rationale for Refill:  Approve     Medication Therapy Plan:         Comments:     Note composed:3:39 PM 10/13/2024

## 2024-10-13 NOTE — TELEPHONE ENCOUNTER
No care due was identified.  [facility] Embedded Care Due Messages. Reference number: [id number].   10/13/2024 7:34:32 AM CDT

## 2024-10-23 DIAGNOSIS — I10 ESSENTIAL HYPERTENSION: ICD-10-CM

## 2025-01-09 DIAGNOSIS — I10 ESSENTIAL HYPERTENSION: ICD-10-CM

## 2025-01-09 NOTE — TELEPHONE ENCOUNTER
Refill Routing Note   Medication(s) are not appropriate for processing by [facility] for the following reason(s):        Required vitals outdated    [facility] action(s):  Defer   Requires appointment : Yes               Appointments  past 12m or future 3m with PCP    Date Provider   Last Visit   10/20/2023 Flavia Fink DO   Next Visit   Visit date not found Flavia Fink DO   ED visits in past 90 days: 0        Note composed:7:35 AM 01/09/2025

## 2025-01-09 NOTE — TELEPHONE ENCOUNTER
Care Due:                  Date            Visit Type   Department     Provider  --------------------------------------------------------------------------------                                EP -                              PRIMARY      [facility] INTERNAL  Last Visit: 10-      CARE ([facility])   MEDICINE       Flavia Fink  Next Visit: None Scheduled  None         None Found                                                            Last  Test          Frequency    Reason                     Performed    Due Date  --------------------------------------------------------------------------------    Office Visit  15 months..  metoprolol...............  10-   01-    [facility] Embedded Care Due Messages. Reference number: [id number].   1/09/2025 12:48:26 AM CST

## 2025-01-13 RX ORDER — METOPROLOL TARTRATE 25 MG/1
25 TABLET, FILM COATED ORAL 2 TIMES DAILY
Qty: 180 TABLET | Refills: 0 | Status: SHIPPED | OUTPATIENT
Start: 2025-01-13 | End: 2025-01-27 | Stop reason: SDUPTHER

## 2025-01-27 ENCOUNTER — OFFICE VISIT (OUTPATIENT)
Dept: INTERNAL MEDICINE | Facility: CLINIC | Age: 40
End: 2025-01-27
Payer: COMMERCIAL

## 2025-01-27 VITALS
TEMPERATURE: 98 F | HEIGHT: 61 IN | DIASTOLIC BLOOD PRESSURE: 84 MMHG | WEIGHT: 132.5 LBS | SYSTOLIC BLOOD PRESSURE: 132 MMHG | HEART RATE: 73 BPM | BODY MASS INDEX: 25.02 KG/M2 | OXYGEN SATURATION: 99 %

## 2025-01-27 DIAGNOSIS — I10 ESSENTIAL HYPERTENSION: ICD-10-CM

## 2025-01-27 DIAGNOSIS — Z00.00 ANNUAL PHYSICAL EXAM: Primary | ICD-10-CM

## 2025-01-27 DIAGNOSIS — G40.909 SEIZURE DISORDER: ICD-10-CM

## 2025-01-27 DIAGNOSIS — Z12.39 ENCOUNTER FOR SCREENING FOR MALIGNANT NEOPLASM OF BREAST, UNSPECIFIED SCREENING MODALITY: ICD-10-CM

## 2025-01-27 DIAGNOSIS — G93.41 METABOLIC ENCEPHALOPATHY: ICD-10-CM

## 2025-01-27 DIAGNOSIS — H53.9 VISION DISTURBANCE: ICD-10-CM

## 2025-01-27 DIAGNOSIS — R53.81 DEBILITY: ICD-10-CM

## 2025-01-27 PROCEDURE — 3075F SYST BP GE 130 - 139MM HG: CPT | Mod: CPTII,S$GLB,,

## 2025-01-27 PROCEDURE — 99999 PR PBB SHADOW E&M-EST. PATIENT-LVL IV: CPT | Mod: PBBFAC,,,

## 2025-01-27 PROCEDURE — 1159F MED LIST DOCD IN RCRD: CPT | Mod: CPTII,S$GLB,,

## 2025-01-27 PROCEDURE — 1160F RVW MEDS BY RX/DR IN RCRD: CPT | Mod: CPTII,S$GLB,,

## 2025-01-27 PROCEDURE — 99396 PREV VISIT EST AGE 40-64: CPT | Mod: S$GLB,,,

## 2025-01-27 PROCEDURE — 3079F DIAST BP 80-89 MM HG: CPT | Mod: CPTII,S$GLB,,

## 2025-01-27 PROCEDURE — 3008F BODY MASS INDEX DOCD: CPT | Mod: CPTII,S$GLB,,

## 2025-01-27 RX ORDER — METOPROLOL TARTRATE 25 MG/1
25 TABLET, FILM COATED ORAL 2 TIMES DAILY
Qty: 180 TABLET | Refills: 2 | Status: SHIPPED | OUTPATIENT
Start: 2025-01-27

## 2025-01-27 RX ORDER — LEVETIRACETAM 100 MG/ML
20 SOLUTION ORAL 2 TIMES DAILY
COMMUNITY

## 2025-01-27 NOTE — PROGRESS NOTES
Shania Tapia  39 y.o. Black or  female  [id number]    Flavia Fink DO  Patient Care Team:  Flavia Fink DO as PCP - General (Internal Medicine)    Chief Complaint:   Chief Complaint   Patient presents with    Annual Exam       History of Present Illness:  Pt is here with her guardian today for annual visit and is new to me.    Pt is without significant concerns today.    NEUROLOGICAL:  She reports infrequent seizures, currently managed with Keppra twice daily. She denies headaches.    CARDIOVASCULAR:  She denies chest pain and continues Metoprolol twice daily for blood pressure management.    GASTROINTESTINAL:  She reports occasional constipation requiring Dulcolax for management, which she attributes to her seizure medication. She denies abdominal pain or blood in stool.    HTN-- metoprolol 25 mg BID  Epilepsy-- Keppra 1000 mg BID      LOV on 10/20/23 with Dr. Fink.    The following were reviewed: active problem list, medication list, allergies, family history, social history, and Health Maintenance.     History:  Past Medical History:   Diagnosis Date    Abnormal Pap smear     Abnormal Pap smear of vagina     Anemia     Anxiety     High serum osmolar gap 06/15/2023    Hypertension     Polyneuropathy     Seizure     Spasticity     Visual hallucination 06/15/2023     Past Surgical History:   Procedure Laterality Date    CERVICAL BIOPSY      Conization      Family History   Problem Relation Name Age of Onset    Diabetes Paternal Grandmother      Hypertension Father      Heart attack Father      Breast cancer Neg Hx      Colon cancer Neg Hx      Ovarian cancer Neg Hx       Social History     Socioeconomic History    Marital status:    Tobacco Use    Smoking status: Never    Smokeless tobacco: Never   Substance and Sexual Activity    Alcohol use: No    Drug use: No    Sexual activity: Yes     Partners: Male     Birth control/protection: None     Social Drivers of Health      Financial Resource Strain: Low Risk  (6/14/2023)    Overall Financial Resource Strain (CARDIA)     Difficulty of Paying Living Expenses: Not hard at all   Food Insecurity: No Food Insecurity (6/14/2023)    Hunger Vital Sign     Worried About Running Out of Food in the Last Year: Never true     Ran Out of Food in the Last Year: Never true   Transportation Needs: No Transportation Needs (6/14/2023)    PRAPARE - Transportation     Lack of Transportation (Medical): No     Lack of Transportation (Non-Medical): No   Physical Activity: Insufficiently Active (6/14/2023)    Exercise Vital Sign     Days of Exercise per Week: 2 days     Minutes of Exercise per Session: 20 min   Stress: Stress Concern Present (10/19/2022)    [employer] - Occupational Stress Questionnaire     Feeling of Stress : To some extent   Housing Stability: Low Risk  (6/14/2023)    Housing Stability Vital Sign     Unable to Pay for Housing in the Last Year: No     Number of Places Lived in the Last Year: 1     Unstable Housing in the Last Year: No     Patient Active Problem List   Diagnosis    Encephalopathy    Hallucinations    Methanol poisoning    Status epilepticus    Microcytic anemia     Review of patient's allergies indicates:   Allergen Reactions    Latex Rash    Latex, natural rubber Rash       Health Maintenance  Health Maintenance Topics with due status: Not Due       Topic Last Completion Date    TETANUS VACCINE 10/07/2018    Cervical Cancer Screening 08/12/2022    Hemoglobin A1c (Diabetic Prevention Screening) 06/14/2023    Mammogram 01/28/2025    RSV Vaccine (Age 60+ and Pregnant patients) Not Due     Health Maintenance Due   Topic Date Due    Influenza Vaccine (1) 09/01/2024    COVID-19 Vaccine (3 - 2024-25 season) 09/01/2024       Medications:  Current Outpatient Medications on File Prior to Visit   Medication Sig Dispense Refill    levETIRAcetam (KEPPRA) 100 mg/mL Soln Take 20 mg/kg by mouth 2 (two) times  daily.      risperiDONE (RISPERDAL) 1 MG tablet Take 1 tablet (1 mg total) by mouth once daily. 90 tablet 0     No current facility-administered medications on file prior to visit.       Medications have been reviewed and reconciled with patient at visit today.    Laboratory Reviewed: (Yes)  Lab Results   Component Value Date    WBC 3.14 (L) 01/28/2025    HGB 10.5 (L) 01/28/2025    HCT 35.7 (L) 01/28/2025     01/28/2025    CHOL 151 01/28/2025    TRIG 60 01/28/2025    HDL 53 01/28/2025    ALT 9 (L) 01/28/2025    AST 16 01/28/2025     01/28/2025    K 4.2 01/28/2025     01/28/2025    CREATININE 0.8 01/28/2025    BUN 10 01/28/2025    CO2 25 01/28/2025    TSH 1.001 01/28/2025    HGBA1C 5.5 06/14/2023       ROS:  Review of Systems   Constitutional:  Negative for chills and fever.   HENT:  Negative for congestion and sore throat.    Respiratory:  Negative for cough and shortness of breath.    Cardiovascular:  Negative for chest pain and leg swelling.   Gastrointestinal:  Positive for constipation. Negative for abdominal pain, blood in stool, diarrhea, nausea and vomiting.   Skin:  Negative for rash.   Neurological:  Negative for dizziness, weakness and headaches.       Exam:  Vitals:    01/27/25 0845   BP: 132/84   Pulse: 73   Temp: 97.5 °F (36.4 °C)     Weight: 60.1 kg (132 lb 7.9 oz)   Body mass index is 25.03 kg/m².    Physical Exam  Vitals reviewed.   Constitutional:       General: She is awake. She is not in acute distress.     Appearance: She is not ill-appearing.   Eyes:      Conjunctiva/sclera: Conjunctivae normal.   Cardiovascular:      Rate and Rhythm: Normal rate and regular rhythm.      Heart sounds: Normal heart sounds.   Pulmonary:      Effort: Pulmonary effort is normal. No respiratory distress.      Breath sounds: Normal breath sounds.   Musculoskeletal:         General: No swelling or deformity.   Skin:     General: Skin is warm and dry.   Neurological:      General: No focal deficit  present.      Mental Status: She is alert.   Psychiatric:         Mood and Affect: Mood normal.         Behavior: Behavior normal.         Assessment:  The primary encounter diagnosis was Annual physical exam. Diagnoses of Essential hypertension, Debility, Seizure disorder, Metabolic encephalopathy, Vision disturbance, and Encounter for screening for malignant neoplasm of breast, unspecified screening modality were also pertinent to this visit.    Plan:  IMPRESSION:  - Assessed patient's overall health status during annual check-up  - Reviewed medication regimen, including Keppra for seizures and Metoprolol for blood pressure  - Evaluated need for routine lab work and mammogram given patient's age    SEIZURE DISORDER:  - Continue Keppra twice daily for seizure management.    HYPERTENSION:  - Continue Metoprolol twice daily for blood pressure control.  - Refilled Metoprolol prescription.  - Inquired about home blood pressure monitoring and symptoms.  - Noted blood pressure is within normal range during the visit.    CONSTIPATION:  - Advised to use Dulcolax (docusate) as needed for occasional constipation, but avoid daily use.  - Performed abdominal exam.  - Discussed possible causes of constipation, including medication side effects and dietary factors.  - Recommend increasing water intake and fiber consumption.  - Recommend adding powdered fiber supplement (e.g., Benefiber or Metamucil) to daily routine.  - Patient to maintain current vegetable intake.    LABS:  - Ordered annual lab work including kidney function, liver function, complete blood count, cholesterol panel, and thyroid test.  - Reviewed medication list and discussed lab orders with the patient.  - Instructed the patient to schedule lab work on any weekday, fasting for at least 8 hours before cholesterol test.    MAMMOGRAM SCREENING:  - Ordered mammogram (1st screening, as patient recently turned 39).        1. Annual physical exam  -     Comprehensive  Metabolic Panel; Future; Expected date: 01/27/2025  -     Lipid Panel; Future; Expected date: 01/27/2025  -     TSH; Future; Expected date: 01/27/2025  -     CBC Auto Differential; Future; Expected date: 01/27/2025    2. Essential hypertension  -     metoprolol tartrate (LOPRESSOR) 25 MG tablet; Take 1 tablet (25 mg total) by mouth 2 (two) times daily.  Dispense: 180 tablet; Refill: 2    3. Debility    4. Seizure disorder    5. Metabolic encephalopathy    6. Vision disturbance    7. Encounter for screening for malignant neoplasm of breast, unspecified screening modality  -     Cancel: Mammo Digital Diagnostic Bilat with Pawel; Future; Expected date: 01/27/2025  -     Mammo Digital Screening Bilat w/ Pawel; Future; Expected date: 01/27/2025      FOLLOW UP:  - Scheduled follow-up visit in 1 year for annual check-up.     Care plan/goals: reviewed    Follow up: Follow up in about 1 year (around 1/27/2026).    This note was generated with the assistance of ambient listening technology. Verbal consent was obtained by the patient and accompanying visitor(s) for the recording of patient appointment to facilitate this note. I attest to having reviewed and edited the generated note for accuracy, though some syntax or spelling errors may persist. Please contact the author of this note for any clarification.

## 2025-01-28 ENCOUNTER — HOSPITAL ENCOUNTER (OUTPATIENT)
Dept: RADIOLOGY | Facility: HOSPITAL | Age: 40
Discharge: HOME | End: 2025-01-28
Payer: COMMERCIAL

## 2025-01-28 DIAGNOSIS — Z12.39 ENCOUNTER FOR SCREENING FOR MALIGNANT NEOPLASM OF BREAST, UNSPECIFIED SCREENING MODALITY: ICD-10-CM

## 2025-01-28 PROCEDURE — 77063 BREAST TOMOSYNTHESIS BI: CPT | Mod: 26,,, | Performed by: RADIOLOGY

## 2025-01-28 PROCEDURE — 77063 BREAST TOMOSYNTHESIS BI: CPT | Mod: TC

## 2025-01-28 PROCEDURE — 77067 SCR MAMMO BI INCL CAD: CPT | Mod: 26,,, | Performed by: RADIOLOGY

## 2025-02-05 NOTE — PROGRESS NOTES
"Date:  2/6/2025    ?  Referring Provider:   Flavia Fink DO    Copies of Letters to the Following:   Flavia Fink DO    Chief Complaint:  I saw Shania Tapia at the [facility] for neuro-ophthalmic evaluation.   She is a 39 y.o. female with a history of status epilepticus on keppra, microcytic anemia, methanol poisoning, who presents for evaluation of vision loss.    History:     HPI    DLS: 12/11/2023 Dr. Velasco     Eye Meds: Pataday prn OU                     Tobramycin and Dexamethasone prn OU     39 y.o. female is here for Optos and OCT RNFL. H/o Hx of Methyl alcohol   causing toxic effect since 6/23.   Pt has limited vision since poisoning. Denies eye pain. Notice white mucus   discharge right eye more then left eye. Having difficulty producing tears.   Pt states that she can't cry. No photophobia, diplopia or headaches. No   noticeable VA changes since last visit. Pt states that she has went to the   [facility] but stop going.     Last edited by Toy Marinelli OA on 2/6/2025 10:28 AM.          12/2023 Rod  "Referred by Dr.Angela Fink DO  Patient here w/ and mother.  Hx of Methyl alcohol causing toxic effect since 6/23.  Pt has limited vision since poisoning.  No eye pain or headaches.      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 return to useful vision. I recommended rehabilitation at the [facility] in [place]. She will use artificial tears to keep her eyes lubricated. She will return to me as needed.."  ?  Current Outpatient Medications   Medication Sig Dispense Refill    b complex vitamins capsule Take 1 capsule by mouth once daily.      ergocalciferol, vitamin D2, (VITAMIN D ORAL) Take by mouth once daily.      levETIRAcetam (KEPPRA) 100 mg/mL Soln Take 20 mg/kg by mouth 2 (two) times daily.      metoprolol tartrate (LOPRESSOR) 25 MG tablet Take 1 tablet (25 mg total) by mouth 2 (two) times daily. 180 " tablet 2     No current facility-administered medications for this visit.     Review of patient's allergies indicates:   Allergen Reactions    Latex Rash    Latex, natural rubber Rash     Past Medical History:   Diagnosis Date    Abnormal Pap smear     Abnormal Pap smear of vagina     Anemia     Anxiety     High serum osmolar gap 06/15/2023    Hypertension     Polyneuropathy     Seizure     Spasticity     Visual hallucination 06/15/2023     Past Surgical History:   Procedure Laterality Date    CERVICAL BIOPSY      Conization      Family History   Problem Relation Name Age of Onset    Glaucoma Mother      Hypertension Father      Heart attack Father      Glaucoma Maternal Uncle      Diabetes Paternal Grandmother      Breast cancer Neg Hx      Colon cancer Neg Hx      Ovarian cancer Neg Hx      Amblyopia Neg Hx      Blindness Neg Hx      Cataracts Neg Hx      Macular degeneration Neg Hx      Retinal detachment Neg Hx      Strabismus Neg Hx       Social History     Socioeconomic History    Marital status:    Tobacco Use    Smoking status: Never    Smokeless tobacco: Never   Substance and Sexual Activity    Alcohol use: No    Drug use: No    Sexual activity: Yes     Partners: Male     Birth control/protection: None     Social Drivers of Health     Financial Resource Strain: Low Risk  (6/14/2023)    Overall Financial Resource Strain (CARDIA)     Difficulty of Paying Living Expenses: Not hard at all   Food Insecurity: No Food Insecurity (6/14/2023)    Hunger Vital Sign     Worried About Running Out of Food in the Last Year: Never true     Ran Out of Food in the Last Year: Never true   Transportation Needs: No Transportation Needs (6/14/2023)    PRAPARE - Transportation     Lack of Transportation (Medical): No     Lack of Transportation (Non-Medical): No   Physical Activity: Insufficiently Active (6/14/2023)    Exercise Vital Sign     Days of Exercise per Week: 2 days     Minutes of Exercise per Session: 20 min    Stress: Stress Concern Present (10/19/2022)    [facility] [employer] - Occupational Stress Questionnaire     Feeling of Stress : To some extent   Housing Stability: Low Risk  (6/14/2023)    Housing Stability Vital Sign     Unable to Pay for Housing in the Last Year: No     Number of Places Lived in the Last Year: 1     Unstable Housing in the Last Year: No       Examination:  She was well-appearing. She was alert and oriented. Attention span and concentration were normal. Speech, language, memory, and general knowledge were intact.      Her distance visual acuity without correction was 20/500  in the right eye and 20/200  in the left eye.  Her near visual acuity without correction was J16 in the right eye and J16 in the left eye.      She perceived 0/8 OD and 0/8 OS Ishihara color plates correctly. Pupils were unreactive OD and minimally reactive to light OS Ocular ductions were full. Orthophoric in primary, right, and left gaze by cross cover. There was no nystagmus. Saccades and pursuits were normal. Lids were symmetric.     Optic discs appeared flat and pallorous OU. Pupillary dilation was not necessary for visualization of the optic disc today.     Laboratories Reviewed:      Latest Reference Range & Units 06/14/23 06:48 06/14/23 17:10 06/20/23 11:08 06/20/23 23:22   Ethylene Glycol Lvl >=20 (Toxic) mg/dL Not Detected      Methanol Lvl mg/dL 123 !  <5 <5   Acetone, Serum mg/dL  Not Detected     Ethanol, Serum mg/dL  Not Detected     Isopropanol, Serum mg/dL  Not Detected     Methanol, Serum mg/dL  90 (HH) (C)     Volatile Screen Serum, Chain of Custody   Test Not Performed     Volatile Screen, Serum   Test Not Performed     (HH): Data is critically high  !: Data is abnormal  (C): Corrected    I personally reviewed the above labs.  ?  Neuroimaging Reviewed:     6/2023 MRI brain wo contrast  INTRACRANIAL: Compared to prior MRI there is interval development of multiple foci of restricted  diffusion including foci in the left greater than right posteromedial thalamus, bilateral posterior putamina, bilateral anterior frontal subcortical white matter, the left insula in hippocampus, bilateral occipital cortex and midline proximal brainstem.  There is also questionable subtle cortical restricted diffusion in the paramedian left frontal lobe.  Corresponding T2 FLAIR hyperintense signal is present.  Subcortical T2 FLAIR hyperintense signal in the occipital lobes.  High bifrontal subcortical T2 FLAIR hyperintense signal without restricted diffusion.  Additionally, there is paramedian posterior parietal subcortical and cortical T2 FLAIR hyperintense signal without restricted diffusion.  No evidence of intracranial hemorrhage.  No extra-axial fluid collection or mass.  No intracranial mass effect.  No hydrocephalus.  Visualized pituitary gland and infundibulum are normal.  Visualized major intracranial vascular structures demonstrate normal flow voids and are normal in course and caliber.     SINUSES: Mild bilateral ethmoid sinus mucosal thickening.  Left greater than right mastoid effusions.  Fluid in the nasopharynx.  Patient is intubated.     ORBITS: Borderline mild bilateral proptosis.     Impression:     1. Nighthawk partially concordant with additions.  Interval development of restricted diffusion in the bilateral anterior frontal subcortical white matter, left greater than right posteromedial thalami, bilateral posterior putamen, left insula and hippocampus, bilateral occipital cortex and midline upper cervical spinal cord.  Some of the findings are compatible with methanol poisoning.  The thalamic and hippocampal involvement, involvement of both the cortical and subcortical white matter in the parietal and occipital lobes, left insular cortex and cervical cord involvement are not classic for either the common or uncommon presentations of methanol poisoning.  Subcortical involvement in the frontal  lobes can be seen with the less common presentation methanol poisoning.  2. The parietal and occipital cortical and subcortical abnormalities have an appearance which is suggestive of acute hypertensive encephalopathy (PRES).  Note atypical pressed can also involve the basal ganglia.  3. Left hippocampal and left greater than right thalamic abnormalities may be related to acute changes associated with seizures.  Insular findings may also be associated with this or PRES.  4. Cervical cord abnormality is not classically involved with the above processes though it is favored to be related to PRES as it has been reported in rare cases with PRES, and in those cases is more likely to be associated with seizures.  Dedicated imaging of the cervical spine may still be considered for further evaluation.    6/2023 MRA brain wo contrast  Impression:     Nighthawk concordant.  No occlusion, hemodynamically significant stenosis or aneurysm.  ?  Ocular Imaging, Photos, Records Reviewed:     OCT RNFL Today 2/6/2025:   Right Eye - Average RNFL 41 global thinning   Left Eye - Average RNFL 43 global thinning with relative temporal sparing     Visual Field Test 24-2 OU Today 2/6/2025:       ?  Impression:  Shania Tapia has history of status epilepticus on keppra, microcytic anemia, methanol poisoning, who presents for evaluation of vision loss. They report no recovery of vision since the initial toxic insult. Neuro-ophthalmologic examination was notable for severely reduced visual acuities, absent color vision, normal ocular motility and alignment. OCT with global thinning of peripapillary RNFL thicknesses OU. Funduscopic exam notable for flat pallorous optic discs OU.     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 She may be a candidate in the future for gene therapy or stem cell therapy for optic neuropathy, but this technology is not yet available.   ?  Plan:  1. Follow up with [facility], ask for different provider as last experience was poor  2. Dr. Hernández for low vision optom, she employs hand held magnifying glass currently    Follow-up:  I will see her in follow-up on an as needed basis.    ?  ?  Visit Checklist (as applicable):  1. Status of new and prior symptoms discussed? yes  2. Neuroimaging reviewed/ ordered as appropriate? yes  3. Ocular imaging and photos reviewed/ ordered as appropriate? yes  4. Plan for work-up and treatment discussed with patient? yes  5. Potential medication side-effects and monitoring plan discussed? N/a  6. Review of outside medical records was performed and pertinent details are summarized in the HPI above? yes    Time spent on this encounter: 45 minutes. This includes face to face time and non-face to face time preparing to see the patient (eg, review of tests), obtaining and/or reviewing separately obtained history, documenting clinical information in the electronic or other health record, independently interpreting results and communicating results to the patient/family/caregiver, or care coordinator.      ALLEN Dykes  Neuro-Ophthalmology Consultant

## 2025-02-06 ENCOUNTER — OFFICE VISIT (OUTPATIENT)
Dept: OPHTHALMOLOGY | Facility: CLINIC | Age: 40
End: 2025-02-06
Payer: COMMERCIAL

## 2025-02-06 ENCOUNTER — CLINICAL SUPPORT (OUTPATIENT)
Dept: OPHTHALMOLOGY | Facility: CLINIC | Age: 40
End: 2025-02-06
Payer: COMMERCIAL

## 2025-02-06 DIAGNOSIS — H53.413 CENTRAL SCOTOMA, BOTH EYES: ICD-10-CM

## 2025-02-06 DIAGNOSIS — T51.1X1D: ICD-10-CM

## 2025-02-06 DIAGNOSIS — H46.3 TOXIC OPTIC NEUROPATHY: Primary | ICD-10-CM

## 2025-02-06 PROCEDURE — 99999 PR PBB SHADOW E&M-EST. PATIENT-LVL III: CPT | Mod: PBBFAC,,, | Performed by: STUDENT IN AN ORGANIZED HEALTH CARE EDUCATION/TRAINING PROGRAM

## 2025-02-06 PROCEDURE — 92250 FUNDUS PHOTOGRAPHY W/I&R: CPT | Mod: S$GLB,,, | Performed by: STUDENT IN AN ORGANIZED HEALTH CARE EDUCATION/TRAINING PROGRAM

## 2025-02-06 PROCEDURE — 99215 OFFICE O/P EST HI 40 MIN: CPT | Mod: S$GLB,,, | Performed by: STUDENT IN AN ORGANIZED HEALTH CARE EDUCATION/TRAINING PROGRAM

## 2025-02-06 PROCEDURE — 1159F MED LIST DOCD IN RCRD: CPT | Mod: CPTII,S$GLB,, | Performed by: STUDENT IN AN ORGANIZED HEALTH CARE EDUCATION/TRAINING PROGRAM

## 2025-02-06 RX ORDER — VITAMIN B COMPLEX
1 CAPSULE ORAL DAILY
COMMUNITY

## 2025-02-19 ENCOUNTER — PATIENT MESSAGE (OUTPATIENT)
Dept: INTERNAL MEDICINE | Facility: CLINIC | Age: 40
End: 2025-02-19
Payer: COMMERCIAL

## 2025-02-19 DIAGNOSIS — Z74.09 IMPAIRED MOBILITY: Primary | ICD-10-CM

## 2025-02-19 DIAGNOSIS — R53.81 DEBILITY: ICD-10-CM

## 2025-04-22 ENCOUNTER — OFFICE VISIT (OUTPATIENT)
Dept: OPTOMETRY | Facility: CLINIC | Age: 40
End: 2025-04-22
Payer: COMMERCIAL

## 2025-04-22 DIAGNOSIS — H52.7 REFRACTIVE ERROR: ICD-10-CM

## 2025-04-22 DIAGNOSIS — H16.223 KERATOCONJUNCTIVITIS SICCA OF BOTH EYES NOT DUE TO SJOGREN'S SYNDROME: ICD-10-CM

## 2025-04-22 DIAGNOSIS — H53.413 CENTRAL SCOTOMA, BOTH EYES: ICD-10-CM

## 2025-04-22 DIAGNOSIS — H46.3 TOXIC OPTIC NEUROPATHY: Primary | ICD-10-CM

## 2025-04-22 DIAGNOSIS — H10.13 ALLERGIC CONJUNCTIVITIS, BILATERAL: ICD-10-CM

## 2025-04-22 PROCEDURE — 1160F RVW MEDS BY RX/DR IN RCRD: CPT | Mod: CPTII,S$GLB,, | Performed by: OPTOMETRIST

## 2025-04-22 PROCEDURE — 1159F MED LIST DOCD IN RCRD: CPT | Mod: CPTII,S$GLB,, | Performed by: OPTOMETRIST

## 2025-04-22 PROCEDURE — 92015 DETERMINE REFRACTIVE STATE: CPT | Mod: S$GLB,,, | Performed by: OPTOMETRIST

## 2025-04-22 PROCEDURE — 99999 PR PBB SHADOW E&M-EST. PATIENT-LVL III: CPT | Mod: PBBFAC,,, | Performed by: OPTOMETRIST

## 2025-04-22 PROCEDURE — 99203 OFFICE O/P NEW LOW 30 MIN: CPT | Mod: S$GLB,,, | Performed by: OPTOMETRIST

## 2025-04-23 NOTE — PROGRESS NOTES
HPI    Pt here for low vision exam per    Prev seen by Rod  Due for Vf exam  No vision changes  Has HH magnifier, uses small spot in lens, been to B , no help with   devices recommended    Eye gtts:  Pataday prn , having dryness and filmy ou, unable to cry  Tobradex gtts prn   Last edited by Valdo Hernández, OD on 4/23/2025  7:41 AM.            Assessment /Plan     For exam results, see Encounter Report.    Toxic optic neuropathy    Central scotoma, both eyes    Keratoconjunctivitis sicca of both eyes not due to Sjogren's syndrome    Allergic conjunctivitis, bilateral    Refractive error      1,2. Referral from Dhaval for low vision, pt has been to [facility] with no good magnifiers given, has one OTC at home, did better with +12 [place], gave magnifier rx, no help with specs near, slight help with dist but she feels she sees TV fine. Due for vf exam next visit.   3. Chronic symptoms per pt, Recommend start gel artificial tears. 1 drop 2x per day. Chronicity of disease and treatment discussed. May try tyrvaya and restasis in future  4. Limit pataday use due to secondary drying effect  5. Optional dist rx, did better with magnifiers for near  I spent a total of 30 minutes on the day of the visit.  This includes face to face time and non-face to face time preparing to see the patient (eg, review of tests), obtaining and/or reviewing separately obtained history, documenting clinical information in the electronic or other health record, independently interpreting results and communicating results to the patient/family/caregiver, or care coordinator.  Educated to extent of vision loss and inability to correct with glasses       Addend 4/25/25 added svl dist and bifocal to rx

## 2025-04-24 ENCOUNTER — PATIENT MESSAGE (OUTPATIENT)
Dept: OPTOMETRY | Facility: CLINIC | Age: 40
End: 2025-04-24
Payer: COMMERCIAL